# Patient Record
Sex: MALE | Race: WHITE | NOT HISPANIC OR LATINO | Employment: UNEMPLOYED | URBAN - NONMETROPOLITAN AREA
[De-identification: names, ages, dates, MRNs, and addresses within clinical notes are randomized per-mention and may not be internally consistent; named-entity substitution may affect disease eponyms.]

---

## 2021-01-04 ENCOUNTER — OFFICE VISIT (OUTPATIENT)
Dept: PULMONOLOGY | Facility: CLINIC | Age: 41
End: 2021-01-04

## 2021-01-04 VITALS
WEIGHT: 184 LBS | BODY MASS INDEX: 29.57 KG/M2 | DIASTOLIC BLOOD PRESSURE: 70 MMHG | TEMPERATURE: 96.9 F | OXYGEN SATURATION: 98 % | RESPIRATION RATE: 16 BRPM | HEART RATE: 80 BPM | HEIGHT: 66 IN | SYSTOLIC BLOOD PRESSURE: 98 MMHG

## 2021-01-04 DIAGNOSIS — F17.200 SMOKING: ICD-10-CM

## 2021-01-04 DIAGNOSIS — R91.1 LUNG NODULE, SOLITARY: Primary | ICD-10-CM

## 2021-01-04 DIAGNOSIS — R93.89 ABNORMAL CT OF THE CHEST: ICD-10-CM

## 2021-01-04 PROCEDURE — 99244 OFF/OP CNSLTJ NEW/EST MOD 40: CPT | Performed by: INTERNAL MEDICINE

## 2021-01-04 RX ORDER — PANTOPRAZOLE SODIUM 40 MG/1
TABLET, DELAYED RELEASE ORAL
COMMUNITY
Start: 2020-09-29 | End: 2022-01-04 | Stop reason: SDUPTHER

## 2021-01-04 RX ORDER — PREDNISONE 10 MG/1
10 TABLET ORAL DAILY
COMMUNITY
End: 2022-01-04

## 2021-01-04 RX ORDER — PROCHLORPERAZINE MALEATE 10 MG
10 TABLET ORAL EVERY 6 HOURS PRN
COMMUNITY

## 2021-01-04 RX ORDER — LORATADINE 10 MG/1
CAPSULE, LIQUID FILLED ORAL
COMMUNITY

## 2021-01-04 RX ORDER — LISINOPRIL 10 MG/1
TABLET ORAL
COMMUNITY
Start: 2020-09-29 | End: 2022-01-04 | Stop reason: SDUPTHER

## 2021-01-04 RX ORDER — PROPRANOLOL HYDROCHLORIDE 10 MG/1
10 TABLET ORAL 2 TIMES DAILY
COMMUNITY
End: 2022-01-04

## 2021-01-04 RX ORDER — ALBUTEROL SULFATE 90 UG/1
2 AEROSOL, METERED RESPIRATORY (INHALATION) 4 TIMES DAILY
COMMUNITY
Start: 2020-12-16 | End: 2023-02-07 | Stop reason: SDUPTHER

## 2021-01-04 RX ORDER — SUMATRIPTAN 100 MG/1
100 TABLET, FILM COATED ORAL
COMMUNITY
End: 2022-01-04

## 2021-01-04 NOTE — PROGRESS NOTES
"  CONSULT NOTE    Requested by:   Enoch Ortega PA Catron, Mark Otis, PA      Chief Complaint   Patient presents with   • Consult     Lung Nodule       Subjective:  Ronnie Reveles is a 40 y.o. male.     History of Present Illness   Patient comes in today for consultation because of abnormal CT.    Patient underwent a CT due to chest pain. he was told that the imaging study showed a 7 millimeter lung nodule for which a pulmonology consultation was requested    Patient says that he is a smoker and has been doing so for the past 30 years. The patient describes no family members with a history of lung cancer.      His mother  of COPD.       The following portions of the patient's history were reviewed and updated as appropriate: allergies, current medications, past family history, past medical history, past social history and past surgical history.    Review of Systems   Constitutional: Negative for chills, fatigue and fever.   HENT: Negative for sinus pressure, sneezing and sore throat.    Respiratory: Negative for cough, chest tightness, shortness of breath and wheezing.    Cardiovascular: Negative for palpitations and leg swelling.   Psychiatric/Behavioral: Negative for sleep disturbance.   All other systems reviewed and are negative.      Past Medical History:   Diagnosis Date   • Abnormal finding of lung    • Emphysema, unspecified (CMS/HCC)    • DAKOTA (generalized anxiety disorder)    • Hyperlipidemia    • Hypertension    • MDD (major depressive disorder)    • Migraine    • Obesity    • Tobacco abuse        Social History     Tobacco Use   • Smoking status: Current Every Day Smoker     Packs/day: 1.00     Years: 30.00     Pack years: 30.00     Types: Cigarettes   • Smokeless tobacco: Never Used   Substance Use Topics   • Alcohol use: Never     Frequency: Never         Objective:  Visit Vitals  BP 98/70   Pulse 80   Temp 96.9 °F (36.1 °C)   Resp 16   Ht 167.6 cm (66\")   Wt 83.5 kg (184 lb)   SpO2 98%   BMI " 29.70 kg/m²       Physical Exam  Vitals signs reviewed.   Constitutional:       Appearance: He is well-developed.   HENT:      Head: Atraumatic.      Mouth/Throat:      Comments: Edentulous.  Neck:      Musculoskeletal: Neck supple.      Thyroid: No thyromegaly.      Vascular: No JVD.   Cardiovascular:      Rate and Rhythm: Normal rate and regular rhythm.      Heart sounds: No murmur.   Pulmonary:      Effort: Pulmonary effort is normal. No respiratory distress.      Breath sounds: No wheezing or rales.   Musculoskeletal:      Comments: Gait was normal.   Skin:     General: Skin is warm and dry.   Neurological:      Mental Status: He is alert and oriented to person, place, and time.   Psychiatric:         Behavior: Behavior normal.           Assessment/Plan:  Diagnoses and all orders for this visit:    1. Lung nodule, solitary (Primary)  -     CT Chest Without Contrast; Future    2. Abnormal CT of the chest  -     CT Chest Without Contrast; Future    3. Smoking        Return in about 10 weeks (around 3/15/2021) for Recheck, Imaging, For Sayda.    DISCUSSION(if any):  Last CT scan was reviewed in great detail with the patient. Images reviewed personally.   CT scan showed 7mm ground glass opacity in the SAGAR.     Based on the history obtained today, and based on the latest guidelines, the patient will need a repeat CT chest in 3 months, from the last CT.    Based on the results of the CT scan, further recommendations will be made.    The patient was asked to call this office if he develops any weight loss, hemoptysis, night sweats etc.    Patient was offered modalities such as Chantix/nicotine patches/Wellbutrin to aid in smoking cessation.    The patient has tried Chantix, patches and Wellbutrin.  Side effects caused him to stop all the above.     Patient was given reading material.        Dictated utilizing Dragon dictation.    This document was electronically signed by Gilbert Medina MD on 01/04/21 at 14:03 EST

## 2021-02-25 DIAGNOSIS — R93.89 ABNORMAL CT OF THE CHEST: ICD-10-CM

## 2021-02-25 DIAGNOSIS — R91.1 LUNG NODULE, SOLITARY: ICD-10-CM

## 2021-03-15 ENCOUNTER — OFFICE VISIT (OUTPATIENT)
Dept: PULMONOLOGY | Facility: CLINIC | Age: 41
End: 2021-03-15

## 2021-03-15 VITALS
RESPIRATION RATE: 18 BRPM | HEIGHT: 66 IN | BODY MASS INDEX: 29.41 KG/M2 | TEMPERATURE: 96.9 F | SYSTOLIC BLOOD PRESSURE: 124 MMHG | DIASTOLIC BLOOD PRESSURE: 74 MMHG | WEIGHT: 183 LBS | OXYGEN SATURATION: 98 % | HEART RATE: 77 BPM

## 2021-03-15 DIAGNOSIS — R06.02 SHORTNESS OF BREATH: ICD-10-CM

## 2021-03-15 DIAGNOSIS — F17.200 SMOKING: ICD-10-CM

## 2021-03-15 DIAGNOSIS — R91.1 LUNG NODULE, SOLITARY: Primary | ICD-10-CM

## 2021-03-15 DIAGNOSIS — R93.89 ABNORMAL CT OF THE CHEST: ICD-10-CM

## 2021-03-15 PROCEDURE — 99214 OFFICE O/P EST MOD 30 MIN: CPT | Performed by: NURSE PRACTITIONER

## 2021-03-15 RX ORDER — MULTIVIT WITH MINERALS/LUTEIN
250 TABLET ORAL DAILY
COMMUNITY

## 2021-03-15 RX ORDER — CHLORAL HYDRATE 500 MG
CAPSULE ORAL
COMMUNITY

## 2021-03-15 NOTE — PROGRESS NOTES
"Chief Complaint   Patient presents with   • Follow-up     lung nodule,solitary         Subjective   Ronnie Reveles is a 41 y.o. male.     History of Present Illness   Patient comes today for follow up of shortness of breath and COPD.     He was diagnosed with COVID-19 about 2 months ago.  He had convalescent plasma.  Overall he is feeling better.    He does complain of shortness of breath if he walks on an incline or upstairs.    His wife states he wheezes in his sleep.    Patient is using medications, as prescribed.  He uses Anoro whenever he thinks to use it which he admittedly says is not every day.  He uses a rescue inhaler 1-2 times a day.    Exercise tolerance has also remained stable.     Continues to smoke 1 pack per day.    The following portions of the patient's history were reviewed and updated as appropriate: allergies, current medications, past family history, past medical history, past social history and past surgical history.      Review of Systems   HENT: Positive for sneezing. Negative for sinus pressure and sore throat.    Respiratory: Positive for cough, chest tightness and wheezing. Negative for shortness of breath.        Objective   Visit Vitals  /74   Pulse 77   Temp 96.9 °F (36.1 °C)   Resp 18   Ht 167.6 cm (65.98\")   Wt 83 kg (183 lb)   SpO2 98%   BMI 29.55 kg/m²         Physical Exam  Vitals reviewed.   HENT:      Head: Atraumatic.      Mouth/Throat:      Mouth: Mucous membranes are moist.      Pharynx: Oropharynx is clear.      Comments: Crowded oropharynx. Edentulous.  Eyes:      Extraocular Movements: Extraocular movements intact.   Cardiovascular:      Rate and Rhythm: Normal rate and regular rhythm.   Pulmonary:      Effort: Pulmonary effort is normal. No respiratory distress.      Breath sounds: Normal breath sounds. No wheezing.   Musculoskeletal:      Cervical back: Neck supple.      Comments: Gait normal.   Skin:     General: Skin is warm.   Neurological:      Mental Status: " He is alert and oriented to person, place, and time.             Assessment/Plan   Diagnoses and all orders for this visit:    1. Lung nodule, solitary (Primary)    2. Abnormal CT of the chest    3. Smoking    4. Shortness of breath  -     Pulmonary Function Test; Future           Return in about 5 months (around 8/15/2021) for Recheck, For Dr. Medina, PFT.    DISCUSSION (if any):  No change to the current medications has been made.  I have asked him to continue using Anoro and to use it every single day.  He should continue using the rescue inhaler as needed.  He continues to have shortness of breath and wheezing however he has not using the medication as prescribed which may help the symptoms quite a bit.    He will be scheduled for a PFT on follow-up visit.    Compliance with medications stressed.     Side effects of prescribed medications discussed with the patient.    Patient was strongly encouraged to quit smoking as soon as possible.    I reviewed his CT scan from February 2021 and it shows that the previous left upper lobe nodule has resolved.  He does not meet the guidelines for annual low-dose CT screening as of yet.    Dictated utilizing Dragon dictation.    This document was electronically signed by HAKEEM Davison March 15, 2021  10:42 EDT

## 2021-08-16 ENCOUNTER — OFFICE VISIT (OUTPATIENT)
Dept: PULMONOLOGY | Facility: CLINIC | Age: 41
End: 2021-08-16

## 2021-08-16 VITALS
DIASTOLIC BLOOD PRESSURE: 72 MMHG | OXYGEN SATURATION: 97 % | WEIGHT: 190 LBS | RESPIRATION RATE: 18 BRPM | SYSTOLIC BLOOD PRESSURE: 110 MMHG | HEIGHT: 66 IN | HEART RATE: 76 BPM | BODY MASS INDEX: 30.53 KG/M2

## 2021-08-16 DIAGNOSIS — R91.1 LUNG NODULE, SOLITARY: Primary | ICD-10-CM

## 2021-08-16 DIAGNOSIS — F17.200 SMOKING: ICD-10-CM

## 2021-08-16 DIAGNOSIS — R06.02 SHORTNESS OF BREATH: ICD-10-CM

## 2021-08-16 PROCEDURE — 94726 PLETHYSMOGRAPHY LUNG VOLUMES: CPT | Performed by: INTERNAL MEDICINE

## 2021-08-16 PROCEDURE — 99212 OFFICE O/P EST SF 10 MIN: CPT | Performed by: INTERNAL MEDICINE

## 2021-08-16 PROCEDURE — 94729 DIFFUSING CAPACITY: CPT | Performed by: INTERNAL MEDICINE

## 2021-08-16 PROCEDURE — 94060 EVALUATION OF WHEEZING: CPT | Performed by: INTERNAL MEDICINE

## 2021-08-16 NOTE — PROGRESS NOTES
"  Chief Complaint   Patient presents with   • Follow-up     Lung Nodule        Subjective   Ronnie Reveles is a 41 y.o. male.     History of Present Illness   Comes back to follow up on CT and due to history of smoking.    Still smokes 1 PPD.     The following portions of the patient's history were reviewed and updated as appropriate: allergies, current medications, past family history, past medical history, past social history and past surgical history.    Review of Systems   Constitutional: Negative for chills and fever.   HENT: Positive for sneezing. Negative for rhinorrhea, sinus pressure and sore throat.    Respiratory: Negative for cough, shortness of breath and wheezing.    Psychiatric/Behavioral: Negative for sleep disturbance.       Objective   Visit Vitals  /72   Pulse 76   Resp 18   Ht 167.6 cm (66\")   Wt 86.2 kg (190 lb)   SpO2 97%   BMI 30.67 kg/m²       Physical Exam  Vitals reviewed.   Constitutional:       Appearance: He is well-developed.   Neck:      Vascular: No JVD.   Musculoskeletal:      Cervical back: Neck supple.      Comments: Gait was normal.   Skin:     General: Skin is warm and dry.   Neurological:      Mental Status: He is alert and oriented to person, place, and time.         Assessment/Plan   Diagnoses and all orders for this visit:    1. Lung nodule, solitary (Primary)  Comments:  Resolved.     2. Smoking           Return if symptoms worsen or fail to improve.    DISCUSSION (if any):  Last CT was reviewed.    PFTs reviewed. Essentially without obstruction.     Smoking cessation was advised and discussed.     Patient says that he has tried Chantix. He can't do nicotine patches.        Dictated utilizing Dragon dictation.    This document was electronically signed by Gilbert Medina MD on 08/16/21 at 11:41 EDT  "

## 2022-01-04 ENCOUNTER — OFFICE VISIT (OUTPATIENT)
Dept: FAMILY MEDICINE CLINIC | Facility: CLINIC | Age: 42
End: 2022-01-04

## 2022-01-04 VITALS
HEIGHT: 66 IN | BODY MASS INDEX: 30.57 KG/M2 | RESPIRATION RATE: 17 BRPM | OXYGEN SATURATION: 97 % | HEART RATE: 88 BPM | DIASTOLIC BLOOD PRESSURE: 76 MMHG | WEIGHT: 190.2 LBS | SYSTOLIC BLOOD PRESSURE: 122 MMHG | TEMPERATURE: 97.8 F

## 2022-01-04 DIAGNOSIS — I10 ESSENTIAL HYPERTENSION: Primary | ICD-10-CM

## 2022-01-04 DIAGNOSIS — F17.200 TOBACCO DEPENDENCE: ICD-10-CM

## 2022-01-04 DIAGNOSIS — R19.7 DIARRHEA FOLLOWING GASTROINTESTINAL SURGERY: ICD-10-CM

## 2022-01-04 DIAGNOSIS — G43.109 MIGRAINE WITH AURA AND WITHOUT STATUS MIGRAINOSUS, NOT INTRACTABLE: ICD-10-CM

## 2022-01-04 DIAGNOSIS — F41.1 GENERALIZED ANXIETY DISORDER: ICD-10-CM

## 2022-01-04 DIAGNOSIS — K21.9 GERD WITHOUT ESOPHAGITIS: ICD-10-CM

## 2022-01-04 DIAGNOSIS — Z98.890 DIARRHEA FOLLOWING GASTROINTESTINAL SURGERY: ICD-10-CM

## 2022-01-04 DIAGNOSIS — F43.9 SITUATIONAL STRESS: ICD-10-CM

## 2022-01-04 DIAGNOSIS — E78.5 DYSLIPIDEMIA: ICD-10-CM

## 2022-01-04 PROCEDURE — 99204 OFFICE O/P NEW MOD 45 MIN: CPT | Performed by: FAMILY MEDICINE

## 2022-01-04 RX ORDER — LISINOPRIL 10 MG/1
10 TABLET ORAL DAILY
Qty: 90 TABLET | Refills: 2 | Status: SHIPPED | OUTPATIENT
Start: 2022-01-04 | End: 2023-01-31

## 2022-01-04 RX ORDER — SIMVASTATIN 10 MG
10 TABLET ORAL DAILY
Qty: 90 TABLET | Refills: 2 | Status: SHIPPED | OUTPATIENT
Start: 2022-01-04 | End: 2022-04-25

## 2022-01-04 RX ORDER — LORATADINE 10 MG/1
TABLET ORAL
COMMUNITY
Start: 2021-12-08 | End: 2022-01-04 | Stop reason: SDUPTHER

## 2022-01-04 RX ORDER — SIMVASTATIN 10 MG
10 TABLET ORAL DAILY
COMMUNITY
Start: 2021-12-23 | End: 2022-01-04 | Stop reason: SDUPTHER

## 2022-01-04 RX ORDER — PANTOPRAZOLE SODIUM 40 MG/1
40 TABLET, DELAYED RELEASE ORAL DAILY
Qty: 90 TABLET | Refills: 2 | Status: SHIPPED | OUTPATIENT
Start: 2022-01-04 | End: 2023-01-31

## 2022-01-04 RX ORDER — COLESEVELAM 180 1/1
1875 TABLET ORAL 2 TIMES DAILY WITH MEALS
Qty: 180 TABLET | Refills: 3 | Status: SHIPPED | OUTPATIENT
Start: 2022-01-04 | End: 2022-03-14

## 2022-01-04 NOTE — PROGRESS NOTES
New Patient History and Physical      Referring Physician: No ref. provider found    Chief Complaint:    Chief Complaint   Patient presents with   • Fatigue   • Restless Legs Syndrome       History of Present Illness:     juancarlos is a 41-year-old male who presents today to establish care with a new primary care provider. Patient has a known history of hypertension and GERD.    Subjective  Patient reports that he has been seen by Dr. Medina in pulmonology in the past, but is no longer continuing to follow up with him. He states that he is interested in smoking cessation and has tried several methods of quitting in the past without success. He notes that he was previously smoking 2.5 to 3 packs of cigarettes per day but has decreased to 1 pack per day. Patient has been smoking since he was 10 years old. He reports that his anxiety is his biggest obstacle to quitting and explains that anytime he becomes stressed or agitated, he smokes a cigarette. He has tried both Wellbutrin and Chantix for smoking cessation in the past and notes that he experienced negative side effects of Chantix, including vivid and disturbing dreams. He denies taking Wellbutrin and Chantix at the time time. Patient also reports trying vaping as well as nicotine patches, gum, and lozenges; however, none worked well for him, and he had a skin reaction to the patches. He is very motivated to quit and is amenable to having GeneSight testing to see which medications he will respond well to.     Patient has been taking propranolol as needed for his migraines. He has also been prescribed Imitrex, which has only intermittently relieved his migraine symptoms. He affirms that he experiences an aura prior to the onset of his migraines. He also experiences nausea and sensitivity to light and sound. Patient reports that his migraines are infrequent, and he has not had one in a couple of months; however, they are severe and occasionally last for several  hours. He states that certain smells, such as gasoline, trigger his migraines.     Patient reports a history of dyslipidemia and states that his cholesterol was elevated last time it was checked.     Patient affirms that he is still taking Protonix for his GERD. He is no longer taking prednisone, which was prescribed at the emergency room after he experienced dyspnea and chest tightness following his 2nd dose of the COVID-19 vaccine.     Subjective     Review of Systems     1. Constitutional: Negative for fever. Negative for chills, diaphoresis, fatigue and unexpected weight change.   2. HENT: No dysphagia; no changes to vision/hearing/smell/taste; no epistaxis  3. Eyes: Negative for redness and visual disturbance.   4. Respiratory: negative for shortness of breath. Negative for chest pain . Negative for cough and chest tightness.   5. Cardiovascular: Negative for chest pain and palpitations.   6. Gastrointestinal:Chronic abd bloating; chronic diarrhea following GI surgery.  7. Endocrine: Negative for cold intolerance and heat intolerance.   8. Genitourinary: Negative for difficulty urinating, dysuria and frequency.   9. Musculoskeletal: Negative for arthralgias, back pain and myalgias.   10. Skin: Negative for color change, rash and wound.   11. Neurological: Negative for syncope, weakness and headaches.   12. Hematological: Negative for adenopathy. Does not bruise/bleed easily.   13. Psychiatric/Behavioral: Negative for confusion. As per above.    The following portions of the patient's history were reviewed and updated as appropriate: allergies, current medications, past family history, past medical history, past social history, past surgical history and problem list.    Past Medical History:   Past Medical History:   Diagnosis Date   • Abnormal finding of lung    • Emphysema, unspecified (HCC)    • DAKOTA (generalized anxiety disorder)    • Hyperlipidemia    • Hypertension    • MDD (major depressive disorder)    •  Migraine    • Obesity    • Tobacco abuse        Past Surgical History:  Past Surgical History:   Procedure Laterality Date   • APPENDECTOMY     • CHOLECYSTECTOMY         Family History: family history includes Arthritis in his mother; Migraine headaches in his mother.    Social History:  reports that he has been smoking cigarettes. He started smoking about 38 years ago. He has a 30.00 pack-year smoking history. He has never used smokeless tobacco. He reports that he does not drink alcohol and does not use drugs.    Medications:    Current Outpatient Medications:   •  albuterol sulfate  (90 Base) MCG/ACT inhaler, Inhale 2 puffs 4 (Four) Times a Day., Disp: , Rfl:   •  lisinopril (PRINIVIL,ZESTRIL) 10 MG tablet, Take 1 tablet by mouth Daily., Disp: 90 tablet, Rfl: 2  •  Loratadine (Claritin) 10 MG capsule, Take  by mouth., Disp: , Rfl:   •  pantoprazole (PROTONIX) 40 MG EC tablet, Take 1 tablet by mouth Daily. To be taken 30 mins prior to a meal, Disp: 90 tablet, Rfl: 2  •  simvastatin (ZOCOR) 10 MG tablet, Take 1 tablet by mouth Daily., Disp: 90 tablet, Rfl: 2  •  vitamin C (ASCORBIC ACID) 250 MG tablet, Take 250 mg by mouth Daily., Disp: , Rfl:   •  vitamin D3 125 MCG (5000 UT) capsule capsule, Take 5,000 Units by mouth Daily., Disp: , Rfl:   •  colesevelam (Welchol) 625 MG tablet, Take 3 tablets by mouth 2 (Two) Times a Day With Meals., Disp: 180 tablet, Rfl: 3  •  Garlic 10 MG capsule, Take  by mouth., Disp: , Rfl:   •  Omega-3 Fatty Acids (fish oil) 1000 MG capsule capsule, Take  by mouth Daily With Breakfast., Disp: , Rfl:   •  prochlorperazine (COMPAZINE) 10 MG tablet, Take 10 mg by mouth Every 6 (Six) Hours As Needed for Nausea or Vomiting., Disp: , Rfl:   •  ubrogepant (ubrogepant) 100 MG tablet, Take 1 tablet by mouth As Needed (migraine). Take one at onset of headache; may repeat for 1 additional dose 1 hour later if needed x 1 dose only, Disp: 4 tablet, Rfl: 0  •  umeclidinium-vilanterol (ANORO  "ELLIPTA) 62.5-25 MCG/INH aerosol powder  inhaler, Inhale 1 puff Daily., Disp: , Rfl:     Allergies:  Allergies   Allergen Reactions   • Sulfa Antibiotics Hives   • Tramadol Hives       Objective     Physical Exam:  Vital Signs: /76   Pulse 88   Temp 97.8 °F (36.6 °C)   Resp 17   Ht 167.6 cm (66\")   Wt 86.3 kg (190 lb 3.2 oz)   SpO2 97%   BMI 30.70 kg/m²        General Appearance: alert, oriented x 3, no acute distress.  Skin: warm and dry.   HEENT: Atraumatic.  pupils round and reactive to light and accommodation, oral mucosa pink and moist.  Nares patent without epistaxis.  External auditory canals are patent tympanic membranes intact.  Neck: supple, no JVD, trachea midline.  No thyromegaly  Lungs: CTA, unlabored breathing effort.  Heart: RRR, normal S1 and S2, no S3, no rub.  Abdomen: soft, non-tender, no palpable bladder, present bowel sounds to auscultation ×4.  No guarding or rigidity.  Extremities: no clubbing, cyanosis or edema.  Good range of motion actively and passively.  Symmetric muscle strength and development  Neuro: normal speech and mental status.  Cranial nerves II through XII intact.  No anosmia. DTR 2+; proprioception intact.  No focal motor/sensory deficits.    Advance Care Planning   ACP discussion was held with the patient during this visit. Patient does not have an advance directive, information provided.    Assessment / Plan     Assessment/Plan:   Diagnoses and all orders for this visit:    1. Essential hypertension (Primary)  -     lisinopril (PRINIVIL,ZESTRIL) 10 MG tablet; Take 1 tablet by mouth Daily.  Dispense: 90 tablet; Refill: 2    2. Dyslipidemia  -     simvastatin (ZOCOR) 10 MG tablet; Take 1 tablet by mouth Daily.  Dispense: 90 tablet; Refill: 2  -     colesevelam (Welchol) 625 MG tablet; Take 3 tablets by mouth 2 (Two) Times a Day With Meals.  Dispense: 180 tablet; Refill: 3    3. Tobacco dependence    4. GERD without esophagitis  -     pantoprazole (PROTONIX) 40 MG " EC tablet; Take 1 tablet by mouth Daily. To be taken 30 mins prior to a meal  Dispense: 90 tablet; Refill: 2    5. Migraine with aura and without status migrainosus, not intractable  -     ubrogepant (ubrogepant) 100 MG tablet; Take 1 tablet by mouth As Needed (migraine). Take one at onset of headache; may repeat for 1 additional dose 1 hour later if needed x 1 dose only  Dispense: 4 tablet; Refill: 0    6. Generalized anxiety disorder    7. Situational stress    8. Diarrhea following gastrointestinal surgery      Plan:   1. Essential hypertension.  - Blood pressure is at goal, vital signs demonstrate hemodynamic stability.   - Continue current dose of lisinopril. Refill sent to his pharmacy.  - Most recent labs were completed just a couple of weeks ago.    2. Dyslipidemia.  - Continue simvastatin.   - Continue low cholesterol dietary intake.   - Given his continued dyslipidemia despite simvastatin use, I have recommended addition of Welchol. He will begin with 1 tablet twice daily, increase to 2 tablets twice daily as tolerated. He may increase up to 3 tablets BID with meals if needed/tolerated.  - Most recent lipid panel does demonstrate improvement since starting statin therapy; however, he is not at goal.     3. Tobacco dependence.  - Patient has tried numerous therapies in the past, including nicotine replacement, Chantix, and bupropion.   - Intolerant to Chantix secondary to nightmares/vivid dreams and altered sensorium.   - Did not continue bupropion, as it did not provide any substantial improvement to his generalized anxiety and situational stress.   - [facility] to further evaluate any other potential medications that would not be therapeutically beneficial for him. We will be more focused on the ones that are recommended.     4. GERD.  - Continue PPI therapy.   - Continue dietary/lifestyle modifications to aid in symptom management.     5. Migraine headache disorder.  - Patient  has not responded well to PRN Imitrex in the past.   - He has not shown any significant improvement to the frequency or severity of his headaches with propranolol dosing. He has already discontinued that.   - He does only have a severe migraine headache every other month or so. Has classic symptoms.   - I have stressed the importance of maintaining appropriate rest and hydration. Avoid any known triggers.   - Samples of Ubrelvy were provided to patient today. He verbalized understanding of the medication. He will notify the office should he develop any ill effects to it.     6. Generalized anxiety disorder.    7. Situational stress.  -  I have recommended GeneSight testing as mentioned above.   Discussed need for stress/anxiety reducing techniques such as prayer/meditation/breathing and counting exercises and avoidance of stress producing environments/situations; will follow clinically.      8. Diarrhea following gastrointestinal surgery.  - He should have significant improvement of his symptoms with use of Welchol as prescribed for his dyslipidemia above.   - Plan to follow clinically.        Discussion Summary:    I spent 45 minutes caring for Ronnie on this date of service. This time includes time spent by me in the following activities:preparing for the visit, performing a medically appropriate examination and/or evaluation , counseling and educating the patient/family/caregiver, ordering medications, tests, or procedures, documenting information in the medical record and care coordination    Discussed plan of care in detail with pt today; pt verb understanding and agrees.  Follow up:  Return in about 3 weeks (around 1/25/2022) for Recheck, Med Change/New Meds.     There are no Patient Instructions on file for this visit.    Transcribed from ambient dictation for Kentrell Saravia DO by Manny Suazo Rep.  01/04/22   21:32 EST    Patient verbalized consent to the visit recording.      Please note  that portions of this note may have been completed with a voice recognition program. Efforts were made to edit the dictations, but occasionally words are mistranscribed.

## 2022-01-19 ENCOUNTER — PRIOR AUTHORIZATION (OUTPATIENT)
Dept: FAMILY MEDICINE CLINIC | Facility: CLINIC | Age: 42
End: 2022-01-19

## 2022-02-02 ENCOUNTER — OFFICE VISIT (OUTPATIENT)
Dept: FAMILY MEDICINE CLINIC | Facility: CLINIC | Age: 42
End: 2022-02-02

## 2022-02-02 VITALS
HEART RATE: 95 BPM | WEIGHT: 192 LBS | BODY MASS INDEX: 30.86 KG/M2 | HEIGHT: 66 IN | SYSTOLIC BLOOD PRESSURE: 124 MMHG | OXYGEN SATURATION: 97 % | DIASTOLIC BLOOD PRESSURE: 70 MMHG

## 2022-02-02 DIAGNOSIS — K21.9 GERD WITHOUT ESOPHAGITIS: ICD-10-CM

## 2022-02-02 DIAGNOSIS — F41.1 GENERALIZED ANXIETY DISORDER: ICD-10-CM

## 2022-02-02 DIAGNOSIS — R19.7 DIARRHEA FOLLOWING GASTROINTESTINAL SURGERY: ICD-10-CM

## 2022-02-02 DIAGNOSIS — Z98.890 DIARRHEA FOLLOWING GASTROINTESTINAL SURGERY: ICD-10-CM

## 2022-02-02 DIAGNOSIS — E78.5 DYSLIPIDEMIA: ICD-10-CM

## 2022-02-02 DIAGNOSIS — I10 ESSENTIAL HYPERTENSION: Primary | ICD-10-CM

## 2022-02-02 DIAGNOSIS — G43.109 MIGRAINE WITH AURA AND WITHOUT STATUS MIGRAINOSUS, NOT INTRACTABLE: ICD-10-CM

## 2022-02-02 DIAGNOSIS — F17.200 TOBACCO DEPENDENCE: ICD-10-CM

## 2022-02-02 PROCEDURE — 99214 OFFICE O/P EST MOD 30 MIN: CPT | Performed by: FAMILY MEDICINE

## 2022-02-02 NOTE — PROGRESS NOTES
Established Patient        Chief Complaint:   Chief Complaint   Patient presents with   • Hypertension     3 week follow up for HTN        Ronnie Reveles is a 41 y.o. male is in today for a scheduled follow-up visit of his hypertension. He is accompanied by an adult female.      History of Present Illness:   The patient reports that he is doing well. He is taking Zocor for his cholesterol.    The patient reports that he gets headaches sometimes. He reports that he has not had to take the Ubrelvy yet.  He reports that he gets them more often in the summer.       Subjective     The following portions of the patient's history were reviewed and updated as appropriate: allergies, current medications, past family history, past medical history, past social history, past surgical history and problem list.    Allergies   Allergen Reactions   • Sulfa Antibiotics Hives   • Tramadol Hives       Review of Systems  1. Constitutional: Negative for fever. Negative for chills, diaphoresis, fatigue and unexpected weight change.   2. HENT: No dysphagia; no changes to vision/hearing/smell/taste; no epistaxis  3. Eyes: Negative for redness and visual disturbance.   4. Respiratory: negative for shortness of breath. Negative for chest pain . Negative for cough and chest tightness.   5. Cardiovascular: Negative for chest pain and palpitations.   6. Gastrointestinal: Negative for abdominal distention, abdominal pain and blood in stool.   7. Endocrine: Negative for cold intolerance and heat intolerance.   8. Genitourinary: Negative for difficulty urinating, dysuria and frequency.   9. Musculoskeletal: Negative for arthralgias, back pain and myalgias.   10. Skin: Negative for color change, rash and wound.   11. Neurological: Negative for syncope, weakness and headaches.   12. Hematological: Negative for adenopathy. Does not bruise/bleed easily.   13. Psychiatric/Behavioral: Negative for confusion. The patient is not  "nervous/anxious.    Objective     Physical Exam   Vital Signs: /70   Pulse 95   Ht 167.6 cm (65.98\")   Wt 87.1 kg (192 lb)   SpO2 97%   BMI 31.00 kg/m²     General Appearance: alert, oriented x 3, no acute distress.  Skin: warm and dry.   HEENT: Atraumatic.  pupils round and reactive to light and accommodation, oral mucosa pink and moist.  Nares patent without epistaxis.  External auditory canals are patent tympanic membranes intact.  Neck: supple, no JVD, trachea midline.  No thyromegaly  Lungs: CTA, unlabored breathing effort.  Heart: RRR, normal S1 and S2, no S3, no rub.  Abdomen: soft, non-tender, no palpable bladder, present bowel sounds to auscultation ×4.  No guarding or rigidity.  Extremities: no clubbing, cyanosis or edema.  Good range of motion actively and passively.  Symmetric muscle strength and development  Neuro: normal speech and mental status.  Cranial nerves II through XII intact.  No anosmia. DTR 2+; proprioception intact.  No focal motor/sensory deficits.    Assessment and Plan      Assessment/Plan:   Diagnoses and all orders for this visit:    1. Essential hypertension (Primary)    2. Dyslipidemia    3. Diarrhea following gastrointestinal surgery  -     sertraline (Zoloft) 50 MG tablet; Take 1 tablet by mouth Daily. 0.5 tablet po daily x 7 days; then 1 tablet po daily each day thereafter  Dispense: 90 tablet; Refill: 1    4. GERD without esophagitis    5. Generalized anxiety disorder    6. Migraine with aura and without status migrainosus, not intractable    7. Tobacco dependence  -     sertraline (Zoloft) 50 MG tablet; Take 1 tablet by mouth Daily. 0.5 tablet po daily x 7 days; then 1 tablet po daily each day thereafter  Dispense: 90 tablet; Refill: 1      Reviewed genesight results with pt today.    Start sertraline; will follow clinically.    Starting welchol today.    Will have pt RTC in 6 weeks or so for eval after starting both new meds.      Discussion Summary:    I spent 30 " minutes caring for Ronnie on this date of service. This time includes time spent by me in the following activities:preparing for the visit, performing a medically appropriate examination and/or evaluation , counseling and educating the patient/family/caregiver, ordering medications, tests, or procedures, documenting information in the medical record and care coordination    Discussed plan of care in detail with pt today; pt verb understanding and agrees.  Follow up:  Return in about 6 weeks (around 3/16/2022) for Recheck, Med Change/New Meds.     There are no Patient Instructions on file for this visit.    Kentrell Saravia DO  02/02/22  14:50 EST        Transcribed from ambient dictation for Kentrell Saravia DO by Bill Lanier. .  02/03/22   11:23 EST    Patient verbalized consent to the visit recording.  I have personally performed the services described in this document as transcribed by the above individual, and it is both accurate and complete.  Kentrell Saravia DO  2/3/2022  12:29 EST      Please note that portions of this note may have been completed with a voice recognition program. Efforts were made to edit the dictations, but occasionally words are mistranscribed.

## 2022-02-09 ENCOUNTER — TELEPHONE (OUTPATIENT)
Dept: FAMILY MEDICINE CLINIC | Facility: CLINIC | Age: 42
End: 2022-02-09

## 2022-02-09 NOTE — TELEPHONE ENCOUNTER
WIFE OF PATIENT HAS CALLED REQUESTING A CALL BACK ASA  TO DISCUSS PATIENT'S ACTIONS SINCE HE HAS BEEN TAKING A NEW MEDICATION.  MEDICATION IS sertraline (Zoloft) 50 MG tablet.  PATIENT'S LEGS AND ARMS JERK A LOT WHEN LYING DOWN. LEGS ARE ALSO CONSTANTLY ACHING MORE SINCE HE HAS STARTED TAKING MEDICATION.  WIFE STATES PATIENT IS ALSO IRRITABLE SINCE BEING ON MEDICATION.  PATIENT DID NOT TAKE MEDICATION TODAY AND WIFE IS REQUESTING A CALL BACK TO ADVISE ON WHAT PATIENT SHOULD DO IN REGARDS TO TAKING MEDICATION.    CALL BACK NUMBER [phone]

## 2022-03-03 ENCOUNTER — TELEPHONE (OUTPATIENT)
Dept: FAMILY MEDICINE CLINIC | Facility: CLINIC | Age: 42
End: 2022-03-03

## 2022-03-03 NOTE — TELEPHONE ENCOUNTER
Caller: CHAYITO CAMARENA    Relationship: Emergency Contact    Best call back number: [phone]    What is the best time to reach you: ANYTIME    Who are you requesting to speak with (clinical staff, provider,  specific staff member): DR CRISTINA    Do you know the name of the person who called: CHAYITO MIGUE    What was the call regarding: PATIENTS ANXIETY MEDICATION IS MAKING HIS LEGS JERK, HIS BODY SHAKE IN HIS SLEEP, AND MAKING HIM IRRITABLE. AND HIS NECK IS HURTING HIM IN THE SAME SPOT WHERE HE HAD SHINGLES BUT THERE IS NO VISUAL INDICATION OF SHINGLES.    Do you require a callback: YES

## 2022-03-13 DIAGNOSIS — E78.5 DYSLIPIDEMIA: ICD-10-CM

## 2022-03-14 RX ORDER — COLESEVELAM 180 1/1
TABLET ORAL
Qty: 180 TABLET | Refills: 3 | Status: SHIPPED | OUTPATIENT
Start: 2022-03-14 | End: 2022-10-18

## 2022-03-14 RX ORDER — COLESEVELAM 180 1/1
TABLET ORAL
Qty: 180 TABLET | Refills: 3 | Status: SHIPPED | OUTPATIENT
Start: 2022-03-14 | End: 2022-07-07

## 2022-03-18 ENCOUNTER — OFFICE VISIT (OUTPATIENT)
Dept: FAMILY MEDICINE CLINIC | Facility: CLINIC | Age: 42
End: 2022-03-18

## 2022-03-18 VITALS
OXYGEN SATURATION: 97 % | SYSTOLIC BLOOD PRESSURE: 138 MMHG | TEMPERATURE: 98 F | DIASTOLIC BLOOD PRESSURE: 82 MMHG | WEIGHT: 190.8 LBS | HEART RATE: 96 BPM | HEIGHT: 66 IN | BODY MASS INDEX: 30.67 KG/M2

## 2022-03-18 DIAGNOSIS — M54.12 CHRONIC CERVICAL RADICULOPATHY: ICD-10-CM

## 2022-03-18 DIAGNOSIS — F41.1 GENERALIZED ANXIETY DISORDER: Primary | ICD-10-CM

## 2022-03-18 DIAGNOSIS — F17.200 TOBACCO DEPENDENCE: ICD-10-CM

## 2022-03-18 DIAGNOSIS — E78.5 DYSLIPIDEMIA: ICD-10-CM

## 2022-03-18 PROCEDURE — 99214 OFFICE O/P EST MOD 30 MIN: CPT | Performed by: FAMILY MEDICINE

## 2022-03-18 RX ORDER — GABAPENTIN 100 MG/1
100 CAPSULE ORAL EVERY EVENING
Qty: 30 CAPSULE | Refills: 1 | Status: SHIPPED | OUTPATIENT
Start: 2022-03-18

## 2022-03-18 RX ORDER — BUSPIRONE HYDROCHLORIDE 5 MG/1
5 TABLET ORAL 2 TIMES DAILY
Qty: 60 TABLET | Refills: 1 | Status: SHIPPED | OUTPATIENT
Start: 2022-03-18 | End: 2022-06-03 | Stop reason: SINTOL

## 2022-03-18 NOTE — PROGRESS NOTES
Established Patient      The patient has consented to being recorded using EJ.    Chief Complaint:   Chief Complaint   Patient presents with   • Follow-up     Patient stopped taking Zoloft due to mood swings and body aches.   • Hypertension        Ronnie Reveles is a 42 y.o. male    History of Present Illness:     The patient presents to the clinic for a scheduled follow-up visit of his hypertension and his mood disorder. The patient stopped taking sertraline due to mood swings and body aches. He is accompanied by an adult female.    The patient reports that he did not do well on the sertraline and his whole body hurt. The adult female states that the patient was getting irritable about a week and a half into it. The adult female states that the patient started shaking about a week and a half into it. He states that he only takes a 0.5f tablet. He notes feeling tired, but not being able to sleep. The patient denies fever, chest pain, shortness of breath, or palpitations. The patient reports that his leg hurt all the time and a muscle in his leg had spasms at night. The patient reports that he is doing okay on the low-dose simvastatin. He reports calling the clinic after his symptoms began and he was informed he needed to discontinue the medication. The adult female states that the patient's anxiety is situational during the week and was on sertraline to help with the anxiety of smoking cessation. She adds the patient has a lack of patience and some irritability.    The patient reports that his neck is still bothering him more at times than the other times. The adult female states that the patient had shingles on his neck in the summer of 2021, in which he went to urgent care because he could not get into the clinic at the time. He reports he was diagnosed with shingles and the blisters subsided after the took the medication. The adult female reports after the medication his neck remained  "sore. He states it feels like it did when he had shingles and notes he can not lay on a pillow or move his head a certain way without pain.  He denies issues with sleeping and states if he gets his neck situated, he sleeps on his side. 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 He felt pain at the time and believed he had a \"crick.\"  He notes he has headaches when his neck hurts and at times wakes up with a headache. He reports he has taken Tylenol or Advil and it subsides; however, by the time he is home from work he has another headache. He denies it being a migraine and notes it is a headache. He states he would like to have his x-rays performed here.    Subjective     The following portions of the patient's history were reviewed and updated as appropriate: allergies, current medications, past family history, past medical history, past social history, past surgical history and problem list.    Allergies   Allergen Reactions   • Sulfa Antibiotics Hives   • Tramadol Hives   • Zoloft [Sertraline] Irritability       Review of Systems  1. Constitutional: Negative for fever. Negative for chills, diaphoresis, fatigue and unexpected weight change.   2. HENT: No dysphagia; no changes to vision/hearing/smell/taste; no epistaxis  3. Eyes: Negative for redness and visual disturbance.   4. Respiratory: negative for shortness of breath. Negative for chest pain . Negative for cough and chest tightness.   5. Cardiovascular: Negative for chest pain and palpitations.   6. Gastrointestinal: Negative for abdominal distention, abdominal pain and blood in stool.   7. Endocrine: Negative for cold intolerance and heat intolerance.   8. Genitourinary: Negative for difficulty urinating, dysuria and frequency.   9. Musculoskeletal: As per above the cervical spine.  10. Skin: Negative for color change, rash and wound.   11. Neurological: Negative for syncope, weakness and headaches. " "  12. Hematological: Negative for adenopathy. Does not bruise/bleed easily.   13. Psychiatric/Behavioral: Negative for confusion. The patient is not nervous/anxious.    Objective     Physical Exam   Vital Signs: /82   Pulse 96   Temp 98 °F (36.7 °C)   Ht 167.6 cm (65.98\")   Wt 86.5 kg (190 lb 12.8 oz)   SpO2 97%   BMI 30.81 kg/m²     General Appearance: alert, oriented x 3, no acute distress.  Skin: warm and dry.   HEENT: Atraumatic.  pupils round and reactive to light and accommodation, oral mucosa pink and moist.  Nares patent without epistaxis.  External auditory canals are patent tympanic membranes intact.  Neck: supple, no JVD, trachea midline.  No thyromegaly.  Diffuse spasticity noted to the paravertebral musculature of the cervical spine.  Impaired range of motion testing on sidebending/rotation/flexion/extension, crepitance noted.  Numerous tender points along the right trapezius muscle additionally.  Lungs: CTA, unlabored breathing effort.  Heart: RRR, normal S1 and S2, no S3, no rub.  Abdomen: soft, non-tender, no palpable bladder, present bowel sounds to auscultation ×4.  No guarding or rigidity.  Extremities: no clubbing, cyanosis or edema.  Impaired range of motion testing to the cervical spine as per above.  Symmetric muscle strength and development  Neuro: normal speech and mental status.  Cranial nerves II through XII intact.  No anosmia. DTR 2+; proprioception intact.  No focal motor/sensory deficits.    Assessment and Plan      Assessment/Plan:   Diagnoses and all orders for this visit:    1. Generalized anxiety disorder (Primary)  -     busPIRone (BUSPAR) 5 MG tablet; Take 1 tablet by mouth 2 (Two) Times a Day.  Dispense: 60 tablet; Refill: 1    2. Tobacco dependence  -     busPIRone (BUSPAR) 5 MG tablet; Take 1 tablet by mouth 2 (Two) Times a Day.  Dispense: 60 tablet; Refill: 1    3. Dyslipidemia    4. Chronic cervical radiculopathy  -     XR Spine Cervical Complete With Obli Flex " Ext  -     gabapentin (NEURONTIN) 100 MG capsule; Take 1 capsule by mouth Every Evening.  Dispense: 30 capsule; Refill: 1      My opinion patient has chronic cervical radiculopathy, with associated spastic torticollis limiting his range of motion.  I have recommended low-dose trial of gabapentin, to be used in the evening additionally.  I have also ordered stress view x-rays of the cervical spine.  Consideration of methocarbamol use if needed.    Patient has not done well with mood stabilizer medications in the past, including most recently with sertraline.  Is currently not utilizing any medication.  I recommended a trial of twice daily dosing low-dose buspirone.  I am hopeful this will provide some anxiolytic benefits, as well as aid in his hopeful tobacco cessation.    Discussed need for stress/anxiety reducing techniques such as prayer/meditation/breathing and counting exercises and avoidance of stress producing environments/situations; will follow clinically.    Continue statin therapy, currently tolerating low-dose simvastatin.  Continue low-cholesterol dietary intake.    Vital signs demonstrate hemodynamic stability.    Discussion Summary:    I spent 30 minutes caring for Ronnie on this date of service. This time includes time spent by me in the following activities:preparing for the visit, performing a medically appropriate examination and/or evaluation , counseling and educating the patient/family/caregiver, ordering medications, tests, or procedures, documenting information in the medical record and care coordination    Discussed plan of care in detail with pt today; pt verb understanding and agrees.  Follow up:  No follow-ups on file.     There are no Patient Instructions on file for this visit.    Kentrell Saravia DO  03/18/22  16:20 EDT        Transcribed from ambient dictation for Kentrell Saravia DO by Tavares To.  03/18/22   18:20 EDT    Patient verbalized consent to the visit recording.  I  have personally performed the services described in this document as transcribed by the above individual, and it is both accurate and complete.  Kentrell Saravia, DO  3/19/2022  07:40 EDT      Please note that portions of this note may have been completed with a voice recognition program. Efforts were made to edit the dictations, but occasionally words are mistranscribed.

## 2022-04-24 DIAGNOSIS — E78.5 DYSLIPIDEMIA: ICD-10-CM

## 2022-04-25 RX ORDER — SIMVASTATIN 10 MG
10 TABLET ORAL DAILY
Qty: 90 TABLET | Refills: 2 | Status: SHIPPED | OUTPATIENT
Start: 2022-04-25 | End: 2023-01-31

## 2022-04-29 ENCOUNTER — OFFICE VISIT (OUTPATIENT)
Dept: FAMILY MEDICINE CLINIC | Facility: CLINIC | Age: 42
End: 2022-04-29

## 2022-04-29 VITALS
BODY MASS INDEX: 31.01 KG/M2 | SYSTOLIC BLOOD PRESSURE: 152 MMHG | DIASTOLIC BLOOD PRESSURE: 86 MMHG | TEMPERATURE: 97.2 F | WEIGHT: 192 LBS | HEART RATE: 80 BPM | OXYGEN SATURATION: 98 %

## 2022-04-29 DIAGNOSIS — M43.6 SPASTIC TORTICOLLIS: ICD-10-CM

## 2022-04-29 DIAGNOSIS — G43.109 MIGRAINE WITH AURA AND WITHOUT STATUS MIGRAINOSUS, NOT INTRACTABLE: Primary | ICD-10-CM

## 2022-04-29 DIAGNOSIS — K64.4 INFLAMED EXTERNAL HEMORRHOID: ICD-10-CM

## 2022-04-29 DIAGNOSIS — K21.9 GERD WITHOUT ESOPHAGITIS: ICD-10-CM

## 2022-04-29 DIAGNOSIS — M54.2 CERVICALGIA: ICD-10-CM

## 2022-04-29 PROCEDURE — 99214 OFFICE O/P EST MOD 30 MIN: CPT | Performed by: FAMILY MEDICINE

## 2022-04-29 RX ORDER — HYDROCORTISONE ACETATE 25 MG/1
25 SUPPOSITORY RECTAL 2 TIMES DAILY
Qty: 12 SUPPOSITORY | Refills: 3 | Status: SHIPPED | OUTPATIENT
Start: 2022-04-29 | End: 2022-10-18

## 2022-04-29 RX ORDER — ERENUMAB-AOOE 70 MG/ML
70 INJECTION SUBCUTANEOUS ONCE
Qty: 1 ML | Refills: 0 | COMMUNITY
Start: 2022-04-29 | End: 2022-04-29

## 2022-04-29 NOTE — PROGRESS NOTES
Established Patient          Chief Complaint:   Chief Complaint   Patient presents with   • Neck Pain   • Rectal Bleeding        Ronnie Reveles is a 42 y.o. male    History of Present Illness:   Here today in scheduled follow-up visit of his migraine headache disorder, chronic spastic torticollis and associated cervicalgia, GERD.    6 HA days/month; responds well to prn ubrelvey.    Has not completed cervical xrays yet; hopes to go this weekend to have done.  Denies any new injuries or trauma.    Denies any aspiration or dysphagia.  He has had periodic bright red blood per the rectum, associated with burning sensation, has a history of external hemorrhoids.  Underwent colonoscopy approximately 4 years ago for evaluation of hematochezia, unremarkable with the exception of hemorrhoids.  Subjective     The following portions of the patient's history were reviewed and updated as appropriate: allergies, current medications, past family history, past medical history, past social history, past surgical history and problem list.    Allergies   Allergen Reactions   • Sulfa Antibiotics Hives   • Tramadol Hives   • Zoloft [Sertraline] Irritability       Review of Systems  1. Constitutional: Negative for fever. Negative for chills, diaphoresis, fatigue and unexpected weight change.   2. HENT: No dysphagia; no changes to vision/hearing/smell/taste; no epistaxis  3. Eyes: Negative for redness and visual disturbance.   4. Respiratory: negative for shortness of breath. Negative for chest pain . Negative for cough and chest tightness.   5. Cardiovascular: Negative for chest pain and palpitations.   6. Gastrointestinal: As per above.  7. Endocrine: Negative for cold intolerance and heat intolerance.   8. Genitourinary: Negative for difficulty urinating, dysuria and frequency.   9. Musculoskeletal: As per above the cervical spine.  10. Skin: Negative for color change, rash and wound.   11. Neurological:  Negative for syncope, weakness and headaches.   12. Hematological: Negative for adenopathy. Does not bruise/bleed easily.   13. Psychiatric/Behavioral: Negative for confusion. The patient is not nervous/anxious.    Objective     Physical Exam   Vital Signs: /86 (BP Location: Right arm, Patient Position: Sitting, Cuff Size: Adult)   Pulse 80   Temp 97.2 °F (36.2 °C)   Wt 87.1 kg (192 lb)   SpO2 98%   BMI 31.01 kg/m²     General Appearance: alert, oriented x 3, no acute distress.  Skin: warm and dry.   HEENT: Atraumatic.  pupils round and reactive to light and accommodation, oral mucosa pink and moist.  Nares patent without epistaxis.  External auditory canals are patent tympanic membranes intact.  Neck: supple, no JVD, trachea midline.  No thyromegaly.  Diffuse spasticity noted to the paravertebral musculature of the cervical spine.  Impaired range of motion testing on sidebending/rotation/flexion/extension, crepitance noted.  Numerous tender points along the right trapezius muscle additionally.  Lungs: CTA, unlabored breathing effort.  Heart: RRR, normal S1 and S2, no S3, no rub.  Abdomen: soft, non-tender, no palpable bladder, present bowel sounds to auscultation ×4.  No guarding or rigidity.  Extremities: no clubbing, cyanosis or edema.  Impaired range of motion testing to the cervical spine as per above.  Symmetric muscle strength and development  Neuro: normal speech and mental status.  Cranial nerves II through XII intact.  No anosmia. DTR 2+; proprioception intact.  No focal motor/sensory deficits.    Assessment and Plan      Assessment/Plan:   Diagnoses and all orders for this visit:    1. Migraine with aura and without status migrainosus, not intractable (Primary)  -     Erenumab-aooe (Aimovig) 70 MG/ML prefilled syringe; Inject 1 mL under the skin into the appropriate area as directed 1 (One) Time for 1 dose.  Dispense: 1 mL; Refill: 0    2. GERD without esophagitis    3. Inflamed external  hemorrhoid  -     hydrocortisone (Anucort-HC) 25 MG suppository; Insert 1 suppository into the rectum 2 (Two) Times a Day.  Dispense: 12 suppository; Refill: 3    4. Spastic torticollis    5. Cervicalgia      Start trial of Aimovig; will follow cliniclly.  First dose provided in office today.    History of normal colonoscopy per pt report at [facility], Dr. Macario, approx 4 yrs ago; burning sensation likely a result of hemorrhoids; will use trial of anucort suppository and follow clinically.    Will follow results of xrays when completed.    VSS.      Continue PPI therapy.  Continue dietary/lifestyle modifications to aid in symptom management of his chronic GERD.    Discussion Summary:    I spent 30 minutes caring for Ronnie on this date of service. This time includes time spent by me in the following activities:preparing for the visit, performing a medically appropriate examination and/or evaluation , counseling and educating the patient/family/caregiver, ordering medications, tests, or procedures, documenting information in the medical record and care coordination    I have reviewed and updated all copied forward information, as appropriate.  I attest to the accuracy and relevance of any unchanged information.    Discussed plan of care in detail with pt today; pt verb understanding and agrees.  Follow up:  No follow-ups on file.     There are no Patient Instructions on file for this visit.    Kentrell Saravia DO  04/30/22  10:21 EDT      Please note that portions of this note may have been completed with a voice recognition program. Efforts were made to edit the dictations, but occasionally words are mistranscribed.

## 2022-04-30 PROBLEM — M54.2 CERVICALGIA: Status: ACTIVE | Noted: 2022-04-30

## 2022-04-30 PROBLEM — M43.6 SPASTIC TORTICOLLIS: Status: ACTIVE | Noted: 2022-04-30

## 2022-06-03 ENCOUNTER — OFFICE VISIT (OUTPATIENT)
Dept: FAMILY MEDICINE CLINIC | Facility: CLINIC | Age: 42
End: 2022-06-03

## 2022-06-03 VITALS
RESPIRATION RATE: 15 BRPM | HEART RATE: 82 BPM | HEIGHT: 66 IN | DIASTOLIC BLOOD PRESSURE: 64 MMHG | OXYGEN SATURATION: 98 % | TEMPERATURE: 98.1 F | SYSTOLIC BLOOD PRESSURE: 108 MMHG | BODY MASS INDEX: 30.7 KG/M2 | WEIGHT: 191 LBS

## 2022-06-03 DIAGNOSIS — G43.109 MIGRAINE WITH AURA AND WITHOUT STATUS MIGRAINOSUS, NOT INTRACTABLE: ICD-10-CM

## 2022-06-03 DIAGNOSIS — F40.01 PANIC DISORDER WITH AGORAPHOBIA, MILD AGORAPHOBIC AVOIDANCE AND MODERATE PANIC ATTACKS: Primary | ICD-10-CM

## 2022-06-03 PROCEDURE — 99213 OFFICE O/P EST LOW 20 MIN: CPT | Performed by: FAMILY MEDICINE

## 2022-06-03 RX ORDER — DIAZEPAM 2 MG/1
2 TABLET ORAL EVERY 12 HOURS PRN
Qty: 40 TABLET | Refills: 0 | Status: SHIPPED | OUTPATIENT
Start: 2022-06-03 | End: 2022-07-12 | Stop reason: SDUPTHER

## 2022-06-03 NOTE — PROGRESS NOTES
Established Patient          Chief Complaint:   Chief Complaint   Patient presents with   • Med Refill        Ronnie Reveles is a 42 y.o. male    History of Present Illness:   In today in scheduled follow-up visit of his panic disorder, as well as migraine headache disorder.    He denies any problems with his current medication regimen.  He is demonstrated no increased frequency/severity of his migraine headaches.  Listed well-controlled as needed anxiolytic medication, as well as cognitive behavioral therapy.    He denies any fever, chills or night sweats.  No seizure-like activity.  Denies chest pain, syncope, palpitations or vertigo.  Maintains an active lifestyle, balanced dietary intake.  He reports good hydration habits.  Subjective     The following portions of the patient's history were reviewed and updated as appropriate: allergies, current medications, past family history, past medical history, past social history, past surgical history and problem list.    Allergies   Allergen Reactions   • Sulfa Antibiotics Hives   • Tramadol Hives   • Zoloft [Sertraline] Irritability       Review of Systems  1. Constitutional: Negative for fever. Negative for chills, diaphoresis, fatigue and unexpected weight change.   2. HENT: No dysphagia; no changes to vision/hearing/smell/taste; no epistaxis  3. Eyes: Negative for redness and visual disturbance.   4. Respiratory: negative for shortness of breath. Negative for chest pain . Negative for cough and chest tightness.   5. Cardiovascular: Negative for chest pain and palpitations.   6. Gastrointestinal: As per above.  7. Endocrine: Negative for cold intolerance and heat intolerance.   8. Genitourinary: Negative for difficulty urinating, dysuria and frequency.   9. Musculoskeletal: As per above the cervical spine.  10. Skin: Negative for color change, rash and wound.   11. Neurological: Negative for syncope, weakness and headaches.  "  12. Hematological: Negative for adenopathy. Does not bruise/bleed easily.   13. Psychiatric/Behavioral: Negative for confusion. The patient is not nervous/anxious.    Objective     Physical Exam   Vital Signs: /64   Pulse 82   Temp 98.1 °F (36.7 °C)   Resp 15   Ht 167.6 cm (66\")   Wt 86.6 kg (191 lb)   SpO2 98%   BMI 30.83 kg/m²     General Appearance: alert, oriented x 3, no acute distress.  Skin: warm and dry.   HEENT: Atraumatic.  pupils round and reactive to light and accommodation, oral mucosa pink and moist.  Nares patent without epistaxis.  External auditory canals are patent tympanic membranes intact.  Neck: supple, no JVD, trachea midline.  No thyromegaly.  Diffuse spasticity noted to the paravertebral musculature of the cervical spine.  Impaired range of motion testing on sidebending/rotation/flexion/extension, crepitance noted.  Numerous tender points along the right trapezius muscle additionally.  Lungs: CTA, unlabored breathing effort.  Heart: RRR, normal S1 and S2, no S3, no rub.  Abdomen: soft, non-tender, no palpable bladder, present bowel sounds to auscultation ×4.  No guarding or rigidity.  Extremities: no clubbing, cyanosis or edema.  Impaired range of motion testing to the cervical spine as per above.  Symmetric muscle strength and development  Neuro: normal speech and mental status.  Cranial nerves II through XII intact.  No anosmia. DTR 2+; proprioception intact.  No focal motor/sensory deficits.    Assessment and Plan      Assessment/Plan:   Diagnoses and all orders for this visit:    1. Panic disorder with agoraphobia, mild agoraphobic avoidance and moderate panic attacks (Primary)  -     diazePAM (Valium) 2 MG tablet; Take 1 tablet by mouth Every 12 (Twelve) Hours As Needed for Anxiety.  Dispense: 40 tablet; Refill: 0    2. Migraine with aura and without status migrainosus, not intractable    I have refilled patient's as needed anxiolytic medication today.    Discussed need " for stress/anxiety reducing techniques such as prayer/meditation/breathing and counting exercises and avoidance of stress producing environments/situations; will follow clinically.    Continue avoidance of any known triggers of his migraine headache disorder.  As needed use of Ubrelvy.      Discussion Summary:    Discussed plan of care in detail with pt today; pt verb understanding and agrees.    I spent 25 minutes caring for Ronnie on this date of service. This time includes time spent by me in the following activities:preparing for the visit, performing a medically appropriate examination and/or evaluation , counseling and educating the patient/family/caregiver, ordering medications, tests, or procedures, documenting information in the medical record and care coordination    I have reviewed and updated all copied forward information, as appropriate.  I attest to the accuracy and relevance of any unchanged information.    Follow up:  No follow-ups on file.     There are no Patient Instructions on file for this visit.    Kentrell Saravia DO  06/03/22  11:20 EDT      Please note that portions of this note may have been completed with a voice recognition program. Efforts were made to edit the dictations, but occasionally words are mistranscribed.

## 2022-06-30 ENCOUNTER — TELEPHONE (OUTPATIENT)
Dept: FAMILY MEDICINE CLINIC | Facility: CLINIC | Age: 42
End: 2022-06-30

## 2022-07-01 RX ORDER — ERENUMAB-AOOE 70 MG/ML
70 INJECTION SUBCUTANEOUS
Qty: 1 ML | Refills: 6 | Status: SHIPPED | OUTPATIENT
Start: 2022-07-01 | End: 2022-10-18 | Stop reason: DRUGHIGH

## 2022-07-07 DIAGNOSIS — E78.5 DYSLIPIDEMIA: ICD-10-CM

## 2022-07-07 RX ORDER — COLESEVELAM 180 1/1
TABLET ORAL
Qty: 180 TABLET | Refills: 3 | Status: SHIPPED | OUTPATIENT
Start: 2022-07-07 | End: 2022-10-18 | Stop reason: SDUPTHER

## 2022-07-12 ENCOUNTER — OFFICE VISIT (OUTPATIENT)
Dept: FAMILY MEDICINE CLINIC | Facility: CLINIC | Age: 42
End: 2022-07-12

## 2022-07-12 VITALS
HEIGHT: 66 IN | HEART RATE: 101 BPM | TEMPERATURE: 98 F | BODY MASS INDEX: 30.53 KG/M2 | RESPIRATION RATE: 16 BRPM | OXYGEN SATURATION: 99 % | DIASTOLIC BLOOD PRESSURE: 66 MMHG | WEIGHT: 190 LBS | SYSTOLIC BLOOD PRESSURE: 112 MMHG

## 2022-07-12 DIAGNOSIS — G43.109 MIGRAINE WITH AURA AND WITHOUT STATUS MIGRAINOSUS, NOT INTRACTABLE: Primary | ICD-10-CM

## 2022-07-12 DIAGNOSIS — Z98.890 DIARRHEA FOLLOWING GASTROINTESTINAL SURGERY: ICD-10-CM

## 2022-07-12 DIAGNOSIS — R19.7 DIARRHEA FOLLOWING GASTROINTESTINAL SURGERY: ICD-10-CM

## 2022-07-12 DIAGNOSIS — F40.01 PANIC DISORDER WITH AGORAPHOBIA, MILD AGORAPHOBIC AVOIDANCE AND MODERATE PANIC ATTACKS: ICD-10-CM

## 2022-07-12 DIAGNOSIS — N45.2 ORCHITIS OF LEFT TESTICLE: ICD-10-CM

## 2022-07-12 DIAGNOSIS — N45.3 EPIDIDYMO-ORCHITIS, ACUTE: ICD-10-CM

## 2022-07-12 PROCEDURE — 99214 OFFICE O/P EST MOD 30 MIN: CPT | Performed by: FAMILY MEDICINE

## 2022-07-12 RX ORDER — DOXYCYCLINE 100 MG/1
100 CAPSULE ORAL EVERY 12 HOURS SCHEDULED
Qty: 20 CAPSULE | Refills: 0 | Status: SHIPPED | OUTPATIENT
Start: 2022-07-12 | End: 2022-07-22

## 2022-07-12 RX ORDER — DIAZEPAM 2 MG/1
2 TABLET ORAL EVERY 12 HOURS PRN
Qty: 40 TABLET | Refills: 0 | Status: SHIPPED | OUTPATIENT
Start: 2022-07-12 | End: 2022-09-22 | Stop reason: SDUPTHER

## 2022-07-12 NOTE — PROGRESS NOTES
Established Patient          Chief Complaint:   Chief Complaint   Patient presents with   • Follow-up        Ronnie Reveles is a 42 y.o. male    History of Present Illness:   Here today in scheduled follow-up visit of his migraine headache disorder, panic disorder and diarrhea following gastrointestinal surgery.    Patient states he has had significant improvement to his anxiety with just as needed use of anxiolytic medication, as well as continued cognitive behavioral therapies.  He does feel that his headache syndrome improvement has significantly benefited his mood disorder additionally.    Pt reports sig improvement to migraine HA freq/severity.  Currently utilizing once monthly Aimovig.  Utilizes as needed medication infrequently.    Patient reports development of left testicular swelling and pain, evaluated in urgent treatment center initially, subsequently transitioned to ER at their recommendation due to the acuteness of his symptoms.  In the emergency room he underwent evaluation which demonstrated need for emergent ultrasound, however there was no technician available at the local ER here in [place].  He was subsequently transitioned to [facility] in [place], where ultrasound demonstrated epididymoorchitis.  He was started on Levaquin antibiotic coverage.    Subjective     The following portions of the patient's history were reviewed and updated as appropriate: allergies, current medications, past family history, past medical history, past social history, past surgical history and problem list.    Allergies   Allergen Reactions   • Sulfa Antibiotics Hives   • Tramadol Hives   • Zoloft [Sertraline] Irritability       Review of Systems  1. Constitutional: Negative for fever. Negative for chills, diaphoresis, fatigue and unexpected weight change.   2. HENT: No dysphagia; no changes to vision/hearing/smell/taste; no epistaxis  3. Eyes: Negative for redness and visual  "disturbance.   4. Respiratory: negative for shortness of breath. Negative for chest pain . Negative for cough and chest tightness.   5. Cardiovascular: Negative for chest pain and palpitations.   6. Gastrointestinal: As per above.  7. Endocrine: Negative for cold intolerance and heat intolerance.   8. Genitourinary: As per above.  9. Musculoskeletal: Chronic cervical spasticity and discomfort.  10. Skin: Negative for color change, rash and wound.   11. Neurological: Negative for syncope, weakness and headaches.   12. Hematological: Negative for adenopathy. Does not bruise/bleed easily.   13. Psychiatric/Behavioral: Negative for confusion. The patient is not nervous/anxious.    Objective     Physical Exam   Vital Signs: /66   Pulse 101   Temp 98 °F (36.7 °C)   Resp 16   Ht 167.6 cm (66\")   Wt 86.2 kg (190 lb)   SpO2 99%   BMI 30.67 kg/m²     General Appearance: alert, oriented x 3, no acute distress.  Skin: warm and dry.   HEENT: Atraumatic.  pupils round and reactive to light and accommodation, oral mucosa pink and moist.  Nares patent without epistaxis.  External auditory canals are patent tympanic membranes intact.  Neck: supple, no JVD, trachea midline.  No thyromegaly.  Diffuse spasticity noted to the paravertebral musculature of the cervical spine.  Impaired range of motion testing on sidebending/rotation/flexion/extension, crepitance noted.  Numerous tender points along the right trapezius muscle additionally.  Lungs: CTA, unlabored breathing effort.  Heart: RRR, normal S1 and S2, no S3, no rub.  Abdomen: soft, non-tender, no palpable bladder, present bowel sounds to auscultation ×4.  No guarding or rigidity.  Testicular exam demonstrates oblique line to bilateral testes, slight tenderness to the left testicle, Prehn's sign positive.  Mild tenderness on palpation of the epididymis of the left.  Bilateral inguinal lymphadenopathy.  No obvious blue dot sign.  Extremities: no clubbing, cyanosis or " edema.  Impaired range of motion testing to the cervical spine as per above.  Symmetric muscle strength and development  Neuro: normal speech and mental status.  Cranial nerves II through XII intact.  No anosmia. DTR 2+; proprioception intact.  No focal motor/sensory deficits.    Assessment and Plan      Assessment/Plan:   Diagnoses and all orders for this visit:    1. Migraine with aura and without status migrainosus, not intractable (Primary)    2. Panic disorder with agoraphobia, mild agoraphobic avoidance and moderate panic attacks  -     diazePAM (Valium) 2 MG tablet; Take 1 tablet by mouth Every 12 (Twelve) Hours As Needed for Anxiety.  Dispense: 40 tablet; Refill: 0    3. Diarrhea following gastrointestinal surgery    4. Orchitis of left testicle  -     Ambulatory Referral to Urology    5. Epididymo-orchitis, acute  -     doxycycline (MONODOX) 100 MG capsule; Take 1 capsule by mouth Every 12 (Twelve) Hours for 10 days.  Dispense: 20 capsule; Refill: 0  -     Ambulatory Referral to Urology    Continue Aimovig; pt continues to see profound improvement in freq/severity.  Continue avoidance of known triggers.  Of stress importance of adequate sleep and hydration, avoid excess caffeine intake.    Referral to urology for evaluation; will provide 10 d course of doxycycline for anti-inflammatory and antimicrobial benefits; symptoms have improved considerably since use of levaquin (has now completed full course), but not resolved.     Discussed need for stress/anxiety reducing techniques such as prayer/meditation/breathing and counting exercises and avoidance of stress producing environments/situations; will follow clinically.  I have refilled patient's as needed anxiolytic medication.    Discussed need for reduction in sodium/salt/caffeine intake; improve sleep habits as able; inc formal CV exercise program with goal of vigorous activity most, if not all, days of the week; goal of 250 min of sustained HR CV exercise  total per week.      Discussion Summary:    Discussed plan of care in detail with pt today; pt verb understanding and agrees.    I spent 30 minutes caring for Ronnie on this date of service. This time includes time spent by me in the following activities:preparing for the visit, reviewing tests, performing a medically appropriate examination and/or evaluation , counseling and educating the patient/family/caregiver, ordering medications, tests, or procedures, referring and communicating with other health care professionals , documenting information in the medical record and care coordination    I have reviewed and updated all copied forward information, as appropriate.  I attest to the accuracy and relevance of any unchanged information.    Follow up:  No follow-ups on file.     There are no Patient Instructions on file for this visit.    Kentrell Saravia DO  07/12/22  17:06 EDT      Please note that portions of this note may have been completed with a voice recognition program. Efforts were made to edit the dictations, but occasionally words are mistranscribed.

## 2022-08-01 ENCOUNTER — OFFICE VISIT (OUTPATIENT)
Dept: UROLOGY | Facility: CLINIC | Age: 42
End: 2022-08-01

## 2022-08-01 VITALS
OXYGEN SATURATION: 98 % | WEIGHT: 190 LBS | RESPIRATION RATE: 17 BRPM | DIASTOLIC BLOOD PRESSURE: 80 MMHG | HEART RATE: 89 BPM | SYSTOLIC BLOOD PRESSURE: 128 MMHG | BODY MASS INDEX: 30.53 KG/M2 | HEIGHT: 66 IN | TEMPERATURE: 97.8 F

## 2022-08-01 DIAGNOSIS — N45.3 ORCHITIS AND EPIDIDYMITIS: Primary | ICD-10-CM

## 2022-08-01 LAB
BILIRUB BLD-MCNC: NEGATIVE MG/DL
CLARITY, POC: CLEAR
COLOR UR: YELLOW
EXPIRATION DATE: ABNORMAL
GLUCOSE UR STRIP-MCNC: NEGATIVE MG/DL
KETONES UR QL: NEGATIVE
LEUKOCYTE EST, POC: NEGATIVE
Lab: ABNORMAL
NITRITE UR-MCNC: NEGATIVE MG/ML
PH UR: 6 [PH] (ref 5–8)
PROT UR STRIP-MCNC: NEGATIVE MG/DL
RBC # UR STRIP: ABNORMAL /UL
SP GR UR: 1.01 (ref 1–1.03)
UROBILINOGEN UR QL: NORMAL

## 2022-08-01 PROCEDURE — 81003 URINALYSIS AUTO W/O SCOPE: CPT | Performed by: PHYSICIAN ASSISTANT

## 2022-08-01 PROCEDURE — 99203 OFFICE O/P NEW LOW 30 MIN: CPT | Performed by: PHYSICIAN ASSISTANT

## 2022-08-01 NOTE — PROGRESS NOTES
Chief Complaint  Left testicular pain    Referring Provider  Kentrell Saravia, DO    HPI  Mr. Reveles is a 42 y.o. male who presents with left testicular pain and swelling, diagnosed with orchitis, one month ago.  He completed at least 2 weeks of antibiotics. It is currently much improved, but he still has tenderness of the testicle if he sits too hard or hits it against the four apple seat.    Quality:    Dull  Provocative factors:  Contact makes it worse  Palliative factors:   Rest makes it better  Radiation:   None  Severity:   0/10 currently and 5/10 at its worst (since antibiotic treatment)  Previous treatments: Antibiotics, Lortab     No Current LUTS  No History of vasectomy  No History of kidney stones, recent trauma or injury.  No History of constipation      Past Medical History  Past Medical History:   Diagnosis Date   • Abnormal finding of lung    • Emphysema, unspecified (HCC)    • DAKOTA (generalized anxiety disorder)    • Hyperlipidemia    • Hypertension    • MDD (major depressive disorder)    • Migraine    • Obesity    • Tobacco abuse        Past Surgical History  Past Surgical History:   Procedure Laterality Date   • APPENDECTOMY     • CHOLECYSTECTOMY         Medications    Current Outpatient Medications:   •  albuterol sulfate  (90 Base) MCG/ACT inhaler, Inhale 2 puffs 4 (Four) Times a Day., Disp: , Rfl:   •  colesevelam (WELCHOL) 625 MG tablet, TAKE 3 TABLETS BY MOUTH TWICE DAILY WITH MEALS, Disp: 180 tablet, Rfl: 3  •  colesevelam (WELCHOL) 625 MG tablet, TAKE 3 TABLETS BY MOUTH TWICE DAILY WITH MEALS, Disp: 180 tablet, Rfl: 3  •  diazePAM (Valium) 2 MG tablet, Take 1 tablet by mouth Every 12 (Twelve) Hours As Needed for Anxiety., Disp: 40 tablet, Rfl: 0  •  Erenumab-aooe (Aimovig) 70 MG/ML prefilled syringe, Inject 1 mL under the skin into the appropriate area as directed Every 30 (Thirty) Days., Disp: 1 mL, Rfl: 6  •  gabapentin (NEURONTIN) 100 MG capsule, Take 1 capsule by mouth Every  Evening., Disp: 30 capsule, Rfl: 1  •  Garlic 10 MG capsule, Take  by mouth., Disp: , Rfl:   •  hydrocortisone (Anucort-HC) 25 MG suppository, Insert 1 suppository into the rectum 2 (Two) Times a Day., Disp: 12 suppository, Rfl: 3  •  lisinopril (PRINIVIL,ZESTRIL) 10 MG tablet, Take 1 tablet by mouth Daily., Disp: 90 tablet, Rfl: 2  •  Loratadine 10 MG capsule, Take  by mouth., Disp: , Rfl:   •  Omega-3 Fatty Acids (fish oil) 1000 MG capsule capsule, Take  by mouth Daily With Breakfast., Disp: , Rfl:   •  pantoprazole (PROTONIX) 40 MG EC tablet, Take 1 tablet by mouth Daily. To be taken 30 mins prior to a meal, Disp: 90 tablet, Rfl: 2  •  prochlorperazine (COMPAZINE) 10 MG tablet, Take 10 mg by mouth Every 6 (Six) Hours As Needed for Nausea or Vomiting., Disp: , Rfl:   •  simvastatin (ZOCOR) 10 MG tablet, TAKE 1 TABLET BY MOUTH DAILY, Disp: 90 tablet, Rfl: 2  •  ubrogepant (ubrogepant) 100 MG tablet, Take 1 tablet by mouth As Needed (migraine). Take one at onset of headache; may repeat for 1 additional dose 1 hour later if needed x 1 dose only, Disp: 4 tablet, Rfl: 0  •  umeclidinium-vilanterol (ANORO ELLIPTA) 62.5-25 MCG/INH aerosol powder  inhaler, Inhale 1 puff Daily., Disp: , Rfl:   •  vitamin C (ASCORBIC ACID) 250 MG tablet, Take 250 mg by mouth Daily., Disp: , Rfl:   •  vitamin D3 125 MCG (5000 UT) capsule capsule, Take 5,000 Units by mouth Daily., Disp: , Rfl:     Allergies  Allergies   Allergen Reactions   • Sulfa Antibiotics Hives   • Tramadol Hives   • Zoloft [Sertraline] Irritability       Social History  Social History     Socioeconomic History   • Marital status:    Tobacco Use   • Smoking status: Current Every Day Smoker     Packs/day: 1.00     Years: 30.00     Pack years: 30.00     Types: Cigarettes     Start date: 1/4/1984   • Smokeless tobacco: Never Used   Vaping Use   • Vaping Use: Never used   Substance and Sexual Activity   • Alcohol use: Never   • Drug use: Never   • Sexual activity: Yes  "      Family History  Family History   Problem Relation Age of Onset   • Arthritis Mother    • Migraine headaches Mother          Physical Exam  Visit Vitals  /80   Pulse 89   Temp 97.8 °F (36.6 °C)   Resp 17   Ht 167.6 cm (65.98\")   Wt 86.2 kg (190 lb)   SpO2 98%   BMI 30.68 kg/m²       Genitourinary  Penis: uncircumcised penis, glans normal, no penile discharge.  No rashes/lesions.    Testes: descended bilaterally, no masses, LEFT epididymis tender to palpation,  RIGHT epididymis nontender to palpation. No varicocele palpated. No hernia appreciated.      Labs  Brief Urine Lab Results  (Last result in the past 365 days)      Color   Clarity   Blood   Leuk Est   Nitrite   Protein   CREAT   Urine HCG        08/01/22 1556 Yellow   Clear   Trace   Negative   Negative   Negative                       Assessment  Mr. Reveles is a 42 y.o. male who presents with persistent left epididymal pain after an episode of epididymo-orchitis in June.  His exam is mostly benign and does reveal moderate ongoing tenderness of the left epididymis.  His UA reveals trace blood by dip.    We discussed ongoing observation and treatment with NSAIDs.  I did advise him of microscopic hematuria and the need for reassessment.    Plan  1.  NSAIDs for pain as instructed with food  2. Obtain urine micro and culture      Marlin Gomez PA-C    "

## 2022-08-04 LAB
APPEARANCE UR: CLEAR
BACTERIA #/AREA URNS HPF: NORMAL /HPF
BACTERIA UR CULT: NO GROWTH
BACTERIA UR CULT: NORMAL
BILIRUB UR QL STRIP: NEGATIVE
CASTS URNS MICRO: NORMAL
COLOR UR: YELLOW
EPI CELLS #/AREA URNS HPF: NORMAL /HPF
GLUCOSE UR QL STRIP: NEGATIVE
HGB UR QL STRIP: NEGATIVE
KETONES UR QL STRIP: NEGATIVE
LEUKOCYTE ESTERASE UR QL STRIP: ABNORMAL
NITRITE UR QL STRIP: NEGATIVE
PH UR STRIP: 6 [PH] (ref 5–8)
PROT UR QL STRIP: NEGATIVE
RBC #/AREA URNS HPF: NORMAL /HPF
SP GR UR STRIP: 1.01 (ref 1–1.03)
UROBILINOGEN UR STRIP-MCNC: ABNORMAL MG/DL
WBC #/AREA URNS HPF: NORMAL /HPF

## 2022-08-30 ENCOUNTER — OFFICE VISIT (OUTPATIENT)
Dept: UROLOGY | Facility: CLINIC | Age: 42
End: 2022-08-30

## 2022-08-30 VITALS
OXYGEN SATURATION: 98 % | HEART RATE: 85 BPM | WEIGHT: 190 LBS | SYSTOLIC BLOOD PRESSURE: 116 MMHG | BODY MASS INDEX: 30.53 KG/M2 | HEIGHT: 66 IN | DIASTOLIC BLOOD PRESSURE: 74 MMHG

## 2022-08-30 DIAGNOSIS — N45.3 ORCHITIS AND EPIDIDYMITIS: Primary | ICD-10-CM

## 2022-08-30 LAB
BILIRUB BLD-MCNC: NEGATIVE MG/DL
CLARITY, POC: CLEAR
COLOR UR: YELLOW
EXPIRATION DATE: ABNORMAL
GLUCOSE UR STRIP-MCNC: NEGATIVE MG/DL
KETONES UR QL: NEGATIVE
LEUKOCYTE EST, POC: NEGATIVE
Lab: ABNORMAL
NITRITE UR-MCNC: NEGATIVE MG/ML
PH UR: 6.5 [PH] (ref 5–8)
PROT UR STRIP-MCNC: NEGATIVE MG/DL
RBC # UR STRIP: ABNORMAL /UL
SP GR UR: 1.01 (ref 1–1.03)
UROBILINOGEN UR QL: NORMAL

## 2022-08-30 PROCEDURE — 81003 URINALYSIS AUTO W/O SCOPE: CPT | Performed by: PHYSICIAN ASSISTANT

## 2022-08-30 PROCEDURE — 99212 OFFICE O/P EST SF 10 MIN: CPT | Performed by: PHYSICIAN ASSISTANT

## 2022-08-30 NOTE — PROGRESS NOTES
Chief Complaint   Patient presents with   • Follow-up     orchitis        HPI  Mr. Reveles is a 42 y.o. male with history of left epididymoorchitis who presents for follow up.     At this visit, pain has entirely resolved.  He has resumed all normal physical activity.  He denies gross hematuria.    Past Medical History:   Diagnosis Date   • Abnormal finding of lung    • Emphysema, unspecified (HCC)    • DAKOTA (generalized anxiety disorder)    • Hyperlipidemia    • Hypertension    • MDD (major depressive disorder)    • Migraine    • Obesity    • Tobacco abuse        Past Surgical History:   Procedure Laterality Date   • APPENDECTOMY     • CHOLECYSTECTOMY           Current Outpatient Medications:   •  albuterol sulfate  (90 Base) MCG/ACT inhaler, Inhale 2 puffs 4 (Four) Times a Day., Disp: , Rfl:   •  colesevelam (WELCHOL) 625 MG tablet, TAKE 3 TABLETS BY MOUTH TWICE DAILY WITH MEALS, Disp: 180 tablet, Rfl: 3  •  colesevelam (WELCHOL) 625 MG tablet, TAKE 3 TABLETS BY MOUTH TWICE DAILY WITH MEALS, Disp: 180 tablet, Rfl: 3  •  diazePAM (Valium) 2 MG tablet, Take 1 tablet by mouth Every 12 (Twelve) Hours As Needed for Anxiety., Disp: 40 tablet, Rfl: 0  •  Erenumab-aooe (Aimovig) 70 MG/ML prefilled syringe, Inject 1 mL under the skin into the appropriate area as directed Every 30 (Thirty) Days., Disp: 1 mL, Rfl: 6  •  gabapentin (NEURONTIN) 100 MG capsule, Take 1 capsule by mouth Every Evening., Disp: 30 capsule, Rfl: 1  •  Garlic 10 MG capsule, Take  by mouth., Disp: , Rfl:   •  hydrocortisone (Anucort-HC) 25 MG suppository, Insert 1 suppository into the rectum 2 (Two) Times a Day., Disp: 12 suppository, Rfl: 3  •  lisinopril (PRINIVIL,ZESTRIL) 10 MG tablet, Take 1 tablet by mouth Daily., Disp: 90 tablet, Rfl: 2  •  Loratadine 10 MG capsule, Take  by mouth., Disp: , Rfl:   •  Omega-3 Fatty Acids (fish oil) 1000 MG capsule capsule, Take  by mouth Daily With Breakfast., Disp: , Rfl:   •  pantoprazole (PROTONIX) 40 MG  "EC tablet, Take 1 tablet by mouth Daily. To be taken 30 mins prior to a meal, Disp: 90 tablet, Rfl: 2  •  prochlorperazine (COMPAZINE) 10 MG tablet, Take 10 mg by mouth Every 6 (Six) Hours As Needed for Nausea or Vomiting., Disp: , Rfl:   •  simvastatin (ZOCOR) 10 MG tablet, TAKE 1 TABLET BY MOUTH DAILY, Disp: 90 tablet, Rfl: 2  •  ubrogepant (ubrogepant) 100 MG tablet, Take 1 tablet by mouth As Needed (migraine). Take one at onset of headache; may repeat for 1 additional dose 1 hour later if needed x 1 dose only, Disp: 4 tablet, Rfl: 0  •  umeclidinium-vilanterol (ANORO ELLIPTA) 62.5-25 MCG/INH aerosol powder  inhaler, Inhale 1 puff Daily., Disp: , Rfl:   •  vitamin C (ASCORBIC ACID) 250 MG tablet, Take 250 mg by mouth Daily., Disp: , Rfl:   •  vitamin D3 125 MCG (5000 UT) capsule capsule, Take 5,000 Units by mouth Daily., Disp: , Rfl:      Physical Exam  Visit Vitals  /74   Pulse 85   Ht 167.6 cm (65.98\")   Wt 86.2 kg (190 lb)   SpO2 98%   BMI 30.69 kg/m²       Labs  Brief Urine Lab Results  (Last result in the past 365 days)      Color   Clarity   Blood   Leuk Est   Nitrite   Protein   CREAT   Urine HCG        08/30/22 1458 Yellow   Clear   Trace   Negative   Negative   Negative                   Urine Culture    Urine Culture 8/2/22   Urine Culture Final report              Assessment  42 y.o. male with history of left epididymal orchitis.  His urine dip at our last visit revealed trace blood that was later found to have 0-2 red blood cells per high-powered field.  His repeat UA today again flags for trace blood.  I will send it again for microscopic quantification.  I have advised the patient that this is low risk as a sign of urothelial cancer.  I did however advise that he have this monitored at least yearly with PCP.  I recommended that if the microscopic amount of blood increases or he ever has gross hematuria to return to our office.  He and his wife state understanding.    Plan  1.  Obtain urine " micro today    Follow-up as needed

## 2022-08-31 LAB
APPEARANCE UR: CLEAR
BACTERIA #/AREA URNS HPF: NORMAL /HPF
BILIRUB UR QL STRIP: NEGATIVE
CASTS URNS MICRO: NORMAL
COLOR UR: YELLOW
EPI CELLS #/AREA URNS HPF: NORMAL /HPF
GLUCOSE UR QL STRIP: NEGATIVE
HGB UR QL STRIP: NEGATIVE
KETONES UR QL STRIP: NEGATIVE
LEUKOCYTE ESTERASE UR QL STRIP: NEGATIVE
NITRITE UR QL STRIP: NEGATIVE
PH UR STRIP: 7 [PH] (ref 5–8)
PROT UR QL STRIP: NEGATIVE
RBC #/AREA URNS HPF: NORMAL /HPF
SP GR UR STRIP: NORMAL (ref 1–1.03)
UROBILINOGEN UR STRIP-MCNC: NORMAL MG/DL
WBC #/AREA URNS HPF: NORMAL /HPF

## 2022-09-13 ENCOUNTER — PRIOR AUTHORIZATION (OUTPATIENT)
Dept: FAMILY MEDICINE CLINIC | Facility: CLINIC | Age: 42
End: 2022-09-13

## 2022-09-13 NOTE — TELEPHONE ENCOUNTER
A PRIOR AUTH HAS BEEN STARTED THROUGH [employer] FOR AIMOVIG.    WAITING ON A RESPONSE FROM THE INSURANCE.    KEY: [id number]

## 2022-09-19 ENCOUNTER — TELEPHONE (OUTPATIENT)
Dept: FAMILY MEDICINE CLINIC | Facility: CLINIC | Age: 42
End: 2022-09-19

## 2022-09-19 NOTE — TELEPHONE ENCOUNTER
Caller: Ronnie Reveles    Relationship to patient: Self    Best call back number:  [phone]    What is the call regarding: PATIENT'S WIFE STATES THAT HIS AIMOVID 140 MG NEEDS A PRIOR AUTHORIZATION.

## 2022-09-22 DIAGNOSIS — F40.01 PANIC DISORDER WITH AGORAPHOBIA, MILD AGORAPHOBIC AVOIDANCE AND MODERATE PANIC ATTACKS: ICD-10-CM

## 2022-09-22 RX ORDER — DIAZEPAM 2 MG/1
2 TABLET ORAL EVERY 12 HOURS PRN
Qty: 40 TABLET | Refills: 0 | Status: SHIPPED | OUTPATIENT
Start: 2022-09-22 | End: 2022-11-18 | Stop reason: SDUPTHER

## 2022-09-22 NOTE — TELEPHONE ENCOUNTER
THE AIMOVIG 70 MG/ML HAS BEEN APPROVED THROUGH THE PATIENTS INSURANCE.    APPROVAL START DATE: 09/13/2022    APPROVAL END DATE: 12/12/2022    PHARMACY HAS BEEN NOTIFIED.

## 2022-09-22 NOTE — TELEPHONE ENCOUNTER
THIS HAS ALSO BEEN APPROVED THROUGH HIS PRIMARY INSURANCE [employer].    APPROVAL START DATE: 09/22/2022    APPROVAL END DATE: 09/22/2023.    PHARMACY HAS BEEN NOTIFIED.    CASE NUMBER: [id number]      KEY: [id number]

## 2022-09-22 NOTE — TELEPHONE ENCOUNTER
Rx Refill Note  Requested Prescriptions     Pending Prescriptions Disp Refills   • diazePAM (Valium) 2 MG tablet 40 tablet 0     Sig: Take 1 tablet by mouth Every 12 (Twelve) Hours As Needed for Anxiety.      Last office visit with prescribing clinician: 7/12/2022      Next office visit with prescribing clinician: 10/18/2022    OK TO FILL?    PATIENT NEEDS UPDATED UDS AND CSA.     PATIENT HAS APPT SCHEDULED /18/2022, AND HAS BEEN ADVISED TO KEEP THIS APPOINTMENT.    Jayna Claire MA  09/22/22, 09:13 EDT

## 2022-09-22 NOTE — TELEPHONE ENCOUNTER
PATIENTS MEDICATION HAS BEEN APPROVED THROUGH THE INSURANCE.    APPROVAL START DATE: 09/13/2022    APPROVAL END DATE: 12/12/2022    PATIENT HAS BEEN NOTIFIED REGARDING APPROVAL.    PHARMACY HAS BEEN NOTIFIED.

## 2022-10-18 ENCOUNTER — OFFICE VISIT (OUTPATIENT)
Dept: FAMILY MEDICINE CLINIC | Facility: CLINIC | Age: 42
End: 2022-10-18

## 2022-10-18 VITALS
WEIGHT: 200.25 LBS | OXYGEN SATURATION: 97 % | SYSTOLIC BLOOD PRESSURE: 122 MMHG | HEART RATE: 85 BPM | DIASTOLIC BLOOD PRESSURE: 72 MMHG | BODY MASS INDEX: 32.18 KG/M2 | HEIGHT: 66 IN

## 2022-10-18 DIAGNOSIS — K21.9 GERD WITHOUT ESOPHAGITIS: Primary | ICD-10-CM

## 2022-10-18 DIAGNOSIS — Z98.890 DIARRHEA FOLLOWING GASTROINTESTINAL SURGERY: ICD-10-CM

## 2022-10-18 DIAGNOSIS — Z13.6 SCREENING FOR CARDIOVASCULAR CONDITION: ICD-10-CM

## 2022-10-18 DIAGNOSIS — I10 ESSENTIAL HYPERTENSION: ICD-10-CM

## 2022-10-18 DIAGNOSIS — K31.84 GASTROPARESIS: ICD-10-CM

## 2022-10-18 DIAGNOSIS — N52.9 VASCULOGENIC ERECTILE DYSFUNCTION, UNSPECIFIED VASCULOGENIC ERECTILE DYSFUNCTION TYPE: ICD-10-CM

## 2022-10-18 DIAGNOSIS — G43.109 MIGRAINE WITH AURA AND WITHOUT STATUS MIGRAINOSUS, NOT INTRACTABLE: ICD-10-CM

## 2022-10-18 DIAGNOSIS — R10.13 ABDOMINAL PAIN, EPIGASTRIC: ICD-10-CM

## 2022-10-18 DIAGNOSIS — R19.7 DIARRHEA FOLLOWING GASTROINTESTINAL SURGERY: ICD-10-CM

## 2022-10-18 PROCEDURE — 99214 OFFICE O/P EST MOD 30 MIN: CPT | Performed by: FAMILY MEDICINE

## 2022-10-18 RX ORDER — TADALAFIL 5 MG/1
5 TABLET ORAL DAILY PRN
Qty: 30 TABLET | Refills: 5 | Status: SHIPPED | OUTPATIENT
Start: 2022-10-18 | End: 2022-12-12 | Stop reason: SDUPTHER

## 2022-10-18 NOTE — PROGRESS NOTES
Established Patient          Chief Complaint:   Chief Complaint   Patient presents with   • Migraine     3 month follow up for migraines. Would like to discuss migraine medication. Patient states that his shot dosage is at 70 mg/ml on the aimovig.   • Bloated     Complaints of abdominal swelling. Patient states that this has been on going for a while now. Patient states that he is having no issues going to the bath room.   • Hyperlipidemia     Would like to discuss having labs.        Ronnie Reveles is a 42 y.o. male    History of Present Illness:   Here today in scheduled follow-up visit of his GERD, hypertension and migraine headache disorder.    Patient reports continued therapeutic benefit being realized with Aimovig, a decrease in frequency/severity of his headaches and starting.  He is interested in potential dose adjustment, as he does continue to have several migraine headaches monthly.  He denies any seizure-like activity.    Patient reports longstanding difficulty with fullness of erections, as well as the ability to maintain an erection.  He denies any hematospermia or hematuria.  Denies fever, chills or night sweats.    Patient also complains of newly developed abdominal bloating, describes epigastric abdominal discomfort.  Patient states he has easy satiety secondary to intense feeling of fullness immediately after eating small amounts of food intake.  He has continued to have bowel function, denies BRB/BTS.  Denies any significant changes to his diet, as he does routinely try to utilize a balanced dietary intake.  He reports good hydration habits.    He denies fever, chills or night sweats.  Denies worsening of reflux symptoms.    Subjective     The following portions of the patient's history were reviewed and updated as appropriate: allergies, current medications, past family history, past medical history, past social history, past surgical history and problem  "list.    Allergies   Allergen Reactions   • Sulfa Antibiotics Hives   • Tramadol Hives   • Zoloft [Sertraline] Irritability       Review of Systems  1. Constitutional: Negative for fever. Negative for chills, diaphoresis, fatigue and unexpected weight change.   2. HENT: No dysphagia; no changes to vision/hearing/smell/taste; no epistaxis  3. Eyes: Negative for redness and visual disturbance.   4. Respiratory: negative for shortness of breath. Negative for chest pain . Negative for cough and chest tightness.   5. Cardiovascular: Negative for chest pain and palpitations.   6. Gastrointestinal: As per above.  7. Endocrine: Negative for cold intolerance and heat intolerance.   8. Genitourinary: As per above.  9. Musculoskeletal: Chronic cervical spasticity and discomfort.  10. Skin: Negative for color change, rash and wound.   11. Neurological: Negative for syncope, weakness and headaches.   12. Hematological: Negative for adenopathy. Does not bruise/bleed easily.   13. Psychiatric/Behavioral: Negative for confusion. The patient is not nervous/anxious.    Objective     Physical Exam   Vital Signs: /72   Pulse 85   Ht 167.6 cm (65.98\")   Wt 90.8 kg (200 lb 4 oz)   SpO2 97%   BMI 32.34 kg/m²     General Appearance: alert, oriented x 3, no acute distress.  Skin: warm and dry.   HEENT: Atraumatic.  pupils round and reactive to light and accommodation, oral mucosa pink and moist.  Nares patent without epistaxis.  External auditory canals are patent tympanic membranes intact.  Neck: supple, no JVD, trachea midline.  No thyromegaly.  Diffuse spasticity noted to the paravertebral musculature of the cervical spine.  Impaired range of motion testing on sidebending/rotation/flexion/extension, crepitance noted.  Numerous tender points along the right trapezius muscle additionally.  Lungs: CTA, unlabored breathing effort.  Heart: RRR, normal S1 and S2, no S3, no rub.  Abdomen: soft, non-tender, no palpable bladder, " present bowel sounds to auscultation ×4.  No guarding or rigidity.  No CVA tenderness; mild epigastric discomfort; post-surgical scarring noted.  Extremities: no clubbing, cyanosis or edema.  Impaired range of motion testing to the cervical spine as per above.  Symmetric muscle strength and development  Neuro: normal speech and mental status.  Cranial nerves II through XII intact.  No anosmia. DTR 2+; proprioception intact.  No focal motor/sensory deficits.    Assessment and Plan      Assessment/Plan:   Diagnoses and all orders for this visit:    1. GERD without esophagitis (Primary)    2. Diarrhea following gastrointestinal surgery    3. Essential hypertension    4. Gastroparesis  -     Ambulatory Referral to General Surgery    5. Abdominal pain, epigastric  -     Ambulatory Referral to General Surgery    6. Vasculogenic erectile dysfunction, unspecified vasculogenic erectile dysfunction type  -     tadalafil (CIALIS) 5 MG tablet; Take 1 tablet by mouth Daily As Needed for Erectile Dysfunction.  Dispense: 30 tablet; Refill: 5    7. Migraine with aura and without status migrainosus, not intractable  -     Erenumab-aooe (AIMOVIG) 140 MG/ML prefilled syringe; Inject 1 mL under the skin into the appropriate area as directed Every 30 (Thirty) Days.  Dispense: 1 mL; Refill: 5      Referral to surgery for eval of epigastric abd discomfort, acute, as well as perceived gastroparesis; strong suspicion of PUD vs. Adhesions given his history of intra-abdominal surgeries.  Suspect patient will need other diagnostics, including upper endoscopy potentially.    Trial of tadalafil for tx of ED.  Will follow clinically.  I have asked that he notify the office should he develop any ill effects to the new medication.    VSS, appears HD asymptomatic.    Plan trial of increased dose of Aimovig to 140 mg.  I have stressed the importance of adequate rest and good hydration habits.  Avoid excessive caffeine intake or any other known  triggers of his migraines.    Surveillance labs ordered today, patient will return to clinic when fasting; these include CBC/CMP/thyroid/lipid panel.    Discussion Summary:    Discussed plan of care in detail with pt today; pt verb understanding and agrees.    I spent 30 minutes caring for Ronnie on this date of service. This time includes time spent by me in the following activities:preparing for the visit, performing a medically appropriate examination and/or evaluation , counseling and educating the patient/family/caregiver, ordering medications, tests, or procedures, referring and communicating with other health care professionals , documenting information in the medical record and care coordination    I have reviewed and updated all copied forward information, as appropriate.  I attest to the accuracy and relevance of any unchanged information.    Follow up:  No follow-ups on file.     There are no Patient Instructions on file for this visit.    Kentrell Saravia,   10/18/22  18:02 EDT      Please note that portions of this note may have been completed with a voice recognition program. Efforts were made to edit the dictations, but occasionally words are mistranscribed.

## 2022-10-19 ENCOUNTER — OFFICE VISIT (OUTPATIENT)
Dept: SURGERY | Facility: CLINIC | Age: 42
End: 2022-10-19

## 2022-10-19 VITALS
BODY MASS INDEX: 31.5 KG/M2 | WEIGHT: 196 LBS | OXYGEN SATURATION: 99 % | TEMPERATURE: 97 F | HEART RATE: 74 BPM | HEIGHT: 66 IN | RESPIRATION RATE: 14 BRPM | SYSTOLIC BLOOD PRESSURE: 116 MMHG | DIASTOLIC BLOOD PRESSURE: 78 MMHG

## 2022-10-19 DIAGNOSIS — K21.00 GASTROESOPHAGEAL REFLUX DISEASE WITH ESOPHAGITIS WITHOUT HEMORRHAGE: ICD-10-CM

## 2022-10-19 DIAGNOSIS — R10.13 EPIGASTRIC PAIN: Primary | ICD-10-CM

## 2022-10-19 PROCEDURE — 99203 OFFICE O/P NEW LOW 30 MIN: CPT | Performed by: SURGERY

## 2022-10-19 NOTE — PROGRESS NOTES
Patient: Ronnie Reveles    YOB: 1980    Date: 10/19/2022    Primary Care Provider: Kentrell Saravia DO    Chief Complaint   Patient presents with   • Pain     epigastric       SUBJECTIVE:    History of present illness:  I saw the patient in the office today as a consultation for evaluation and treatment of epigastric pain with abdominal bloating especially after eating.    He has had a previous laparoscopic appendectomy, he has had a previous laparoscopic cholecystectomy 5 years ago in [place] for extensive cholelithiasis.    He complains of midepigastric abdominal discomfort associated with bloating, he does state that he hurts around the area of epigastric incision from previous laparoscopic cholecystectomy, this pain is dull and sharp in nature, present over many months, worse with heavy lifting, nonradiating, nothing seems to make it better.    He also gives a history significant for reflux esophagitis and does take a proton pump inhibitor which works sometimes.    The following portions of the patient's history were reviewed and updated as appropriate: allergies, current medications, past family history, past medical history, past social history, past surgical history and problem list.    Review of Systems   Constitutional: Negative for chills, fever and unexpected weight change.   HENT: Negative for trouble swallowing and voice change.    Eyes: Negative for visual disturbance.   Respiratory: Negative for apnea, cough, chest tightness, shortness of breath and wheezing.    Cardiovascular: Negative for chest pain, palpitations and leg swelling.   Gastrointestinal: Positive for abdominal distention and abdominal pain. Negative for anal bleeding, blood in stool, constipation, diarrhea, nausea, rectal pain and vomiting.   Endocrine: Negative for cold intolerance and heat intolerance.   Genitourinary: Negative for difficulty urinating, dysuria, flank pain, scrotal swelling and testicular pain.    Musculoskeletal: Negative for back pain, gait problem and joint swelling.   Skin: Negative for color change, rash and wound.   Neurological: Negative for dizziness, syncope, speech difficulty, weakness, numbness and headaches.   Hematological: Negative for adenopathy. Does not bruise/bleed easily.   Psychiatric/Behavioral: Negative for confusion. The patient is not nervous/anxious.        History:  Past Medical History:   Diagnosis Date   • Abnormal finding of lung    • Emphysema, unspecified (HCC)    • DAKOTA (generalized anxiety disorder)    • Hyperlipidemia    • Hypertension    • MDD (major depressive disorder)    • Migraine    • Obesity    • Tobacco abuse        Past Surgical History:   Procedure Laterality Date   • APPENDECTOMY     • CHOLECYSTECTOMY         Family History   Problem Relation Age of Onset   • Arthritis Mother    • Migraine headaches Mother        Social History     Tobacco Use   • Smoking status: Every Day     Packs/day: 1.00     Years: 30.00     Pack years: 30.00     Types: Cigarettes     Start date: 1/4/1984   • Smokeless tobacco: Never   Vaping Use   • Vaping Use: Never used   Substance Use Topics   • Alcohol use: Never   • Drug use: Never       Allergies:  Allergies   Allergen Reactions   • Sulfa Antibiotics Hives   • Tramadol Hives   • Zoloft [Sertraline] Irritability       Medications:    Current Outpatient Medications:   •  albuterol sulfate  (90 Base) MCG/ACT inhaler, Inhale 2 puffs 4 (Four) Times a Day., Disp: , Rfl:   •  diazePAM (Valium) 2 MG tablet, Take 1 tablet by mouth Every 12 (Twelve) Hours As Needed for Anxiety., Disp: 40 tablet, Rfl: 0  •  Erenumab-aooe (AIMOVIG) 140 MG/ML prefilled syringe, Inject 1 mL under the skin into the appropriate area as directed Every 30 (Thirty) Days., Disp: 1 mL, Rfl: 5  •  gabapentin (NEURONTIN) 100 MG capsule, Take 1 capsule by mouth Every Evening., Disp: 30 capsule, Rfl: 1  •  Garlic 10 MG capsule, Take  by mouth., Disp: , Rfl:   •   "lisinopril (PRINIVIL,ZESTRIL) 10 MG tablet, Take 1 tablet by mouth Daily., Disp: 90 tablet, Rfl: 2  •  Loratadine 10 MG capsule, Take  by mouth., Disp: , Rfl:   •  Omega-3 Fatty Acids (fish oil) 1000 MG capsule capsule, Take  by mouth Daily With Breakfast., Disp: , Rfl:   •  pantoprazole (PROTONIX) 40 MG EC tablet, Take 1 tablet by mouth Daily. To be taken 30 mins prior to a meal, Disp: 90 tablet, Rfl: 2  •  prochlorperazine (COMPAZINE) 10 MG tablet, Take 10 mg by mouth Every 6 (Six) Hours As Needed for Nausea or Vomiting., Disp: , Rfl:   •  simvastatin (ZOCOR) 10 MG tablet, TAKE 1 TABLET BY MOUTH DAILY, Disp: 90 tablet, Rfl: 2  •  tadalafil (CIALIS) 5 MG tablet, Take 1 tablet by mouth Daily As Needed for Erectile Dysfunction., Disp: 30 tablet, Rfl: 5  •  ubrogepant (ubrogepant) 100 MG tablet, Take 1 tablet by mouth As Needed (migraine). Take one at onset of headache; may repeat for 1 additional dose 1 hour later if needed x 1 dose only, Disp: 4 tablet, Rfl: 0  •  umeclidinium-vilanterol (ANORO ELLIPTA) 62.5-25 MCG/INH aerosol powder  inhaler, Inhale 1 puff Daily., Disp: , Rfl:   •  vitamin C (ASCORBIC ACID) 250 MG tablet, Take 250 mg by mouth Daily., Disp: , Rfl:   •  vitamin D3 125 MCG (5000 UT) capsule capsule, Take 5,000 Units by mouth Daily., Disp: , Rfl:     OBJECTIVE:    Vital Signs:   Vitals:    10/19/22 1431   BP: 116/78   Pulse: 74   Resp: 14   Temp: 97 °F (36.1 °C)   SpO2: 99%   Weight: 88.9 kg (196 lb)   Height: 167.6 cm (66\")       Physical Exam:   General Appearance:    Alert, cooperative, in no acute distress   Head:    Normocephalic, without obvious abnormality, atraumatic   Eyes:            Lids and lashes normal, conjunctivae and sclerae normal, no   icterus, no pallor, corneas clear, PERRLA   Ears:    Ears appear intact with no abnormalities noted   Throat:   No oral lesions, no thrush, oral mucosa moist   Neck:   No adenopathy, supple, trachea midline, no thyromegaly, no   carotid bruit, no JVD "   Lungs:     Clear to auscultation,respirations regular, even and                  unlabored    Heart:    Regular rhythm and normal rate, normal S1 and S2, no            murmur, no gallop, no rub, no click   Chest Wall:    No abnormalities observed   Abdomen:     Normal bowel sounds, no masses, no organomegaly, soft        non-tender, non-distended, no guarding, there is evidence of epigastric  tenderness   Extremities:   Moves all extremities well, no edema, no cyanosis, no             redness   Pulses:   Pulses palpable and equal bilaterally   Skin:   No bleeding, bruising or rash   Lymph nodes:   No palpable adenopathy   Neurologic:   Cranial nerves 2 - 12 grossly intact, sensation intact     Results Review:   I reviewed the patient's new clinical results.  I reviewed the patient's new imaging results and agree with the interpretation.  I reviewed the patient's other test results and agree with the interpretation    Review of Systems was reviewed and confirmed as accurate as documented by the MA.    ASSESSMENT/PLAN:    1. Epigastric pain        I did have a detailed and extensive discussion with the patient in the office and they understand that they need to undergo upper endoscopy. Full risks and benefits of operative versus nonoperative intervention were discussed with the patient and these include bleeding and esophageal injury. The patient understands, agrees, and wishes to proceed with the surgical treatment plan as mentioned above. The patient had no questions for me at the end of the discussion.       I discussed the patients findings and my recommendations with patient.     We did perform ultrasound of the anterior abdominal wall today.  This showed no evidence of abdominal wall hernia, we will proceed with upper endoscopy.    Electronically signed by Clemente Amado MD  10/19/22 12:03 EDT

## 2022-10-25 ENCOUNTER — TELEPHONE (OUTPATIENT)
Dept: SURGERY | Facility: CLINIC | Age: 42
End: 2022-10-25

## 2022-10-25 NOTE — TELEPHONE ENCOUNTER
Called to confirm EGD, 10/28/22, Dr Amado @ [facility] no answer,left voice message

## 2022-10-28 ENCOUNTER — OUTSIDE FACILITY SERVICE (OUTPATIENT)
Dept: SURGERY | Facility: CLINIC | Age: 42
End: 2022-10-28

## 2022-10-28 PROCEDURE — 43239 EGD BIOPSY SINGLE/MULTIPLE: CPT | Performed by: SURGERY

## 2022-11-01 ENCOUNTER — TELEPHONE (OUTPATIENT)
Dept: FAMILY MEDICINE CLINIC | Facility: CLINIC | Age: 42
End: 2022-11-01

## 2022-11-01 NOTE — TELEPHONE ENCOUNTER
Caller: CHAYITO CAMARENA    Relationship to patient: Emergency Contact    Best call back number:  [phone]    Date of positive COVID19 test:  10/31/22    COVID19 symptoms: COUGHING UNTIL HE VOMITS, BAD TASTE IN MOUTH, BODY ACHE, STUFFY HEAD, DIARRHEA    Additional information or concerns:     CAN SOMETHING BE CALLED IN FOR COUGHING AND POSSIBLE ANTIBIOTIC?    What is the patients preferred pharmacy:     [id number] - [place], [place] - [address] AT [address] OF [address] - [phone] [facility] [phone] FX

## 2022-11-03 RX ORDER — DEXAMETHASONE 0.5 MG/1
TABLET ORAL
Qty: 21 TABLET | Refills: 0 | Status: SHIPPED | OUTPATIENT
Start: 2022-11-03 | End: 2022-12-14

## 2022-11-03 RX ORDER — BROMPHENIRAMINE MALEATE, PSEUDOEPHEDRINE HYDROCHLORIDE, AND DEXTROMETHORPHAN HYDROBROMIDE 2; 30; 10 MG/5ML; MG/5ML; MG/5ML
5 SYRUP ORAL 4 TIMES DAILY PRN
Qty: 118 ML | Refills: 0 | Status: SHIPPED | OUTPATIENT
Start: 2022-11-03 | End: 2022-12-14

## 2022-11-18 DIAGNOSIS — F40.01 PANIC DISORDER WITH AGORAPHOBIA, MILD AGORAPHOBIC AVOIDANCE AND MODERATE PANIC ATTACKS: ICD-10-CM

## 2022-11-18 NOTE — TELEPHONE ENCOUNTER
Rx Refill Note  Requested Prescriptions     Pending Prescriptions Disp Refills   • diazePAM (Valium) 2 MG tablet 40 tablet 0     Sig: Take 1 tablet by mouth Every 12 (Twelve) Hours As Needed for Anxiety.      Last office visit with prescribing clinician: 10/18/2022      Next office visit with prescribing clinician: 12/12/2022            Elizabeth Mora MA  11/18/22, 13:23 EST

## 2022-11-22 RX ORDER — DIAZEPAM 2 MG/1
2 TABLET ORAL EVERY 12 HOURS PRN
Qty: 40 TABLET | Refills: 0 | Status: SHIPPED | OUTPATIENT
Start: 2022-11-22 | End: 2022-12-12 | Stop reason: SDUPTHER

## 2022-12-12 ENCOUNTER — OFFICE VISIT (OUTPATIENT)
Dept: FAMILY MEDICINE CLINIC | Facility: CLINIC | Age: 42
End: 2022-12-12

## 2022-12-12 VITALS
HEART RATE: 99 BPM | SYSTOLIC BLOOD PRESSURE: 120 MMHG | HEIGHT: 66 IN | BODY MASS INDEX: 31.72 KG/M2 | TEMPERATURE: 98 F | DIASTOLIC BLOOD PRESSURE: 78 MMHG | OXYGEN SATURATION: 97 % | RESPIRATION RATE: 16 BRPM | WEIGHT: 197.4 LBS

## 2022-12-12 DIAGNOSIS — J45.40 MODERATE PERSISTENT REACTIVE AIRWAY DISEASE WITHOUT COMPLICATION: ICD-10-CM

## 2022-12-12 DIAGNOSIS — N52.9 VASCULOGENIC ERECTILE DYSFUNCTION, UNSPECIFIED VASCULOGENIC ERECTILE DYSFUNCTION TYPE: ICD-10-CM

## 2022-12-12 DIAGNOSIS — F40.01 PANIC DISORDER WITH AGORAPHOBIA, MILD AGORAPHOBIC AVOIDANCE AND MODERATE PANIC ATTACKS: ICD-10-CM

## 2022-12-12 DIAGNOSIS — G43.109 MIGRAINE WITH AURA AND WITHOUT STATUS MIGRAINOSUS, NOT INTRACTABLE: ICD-10-CM

## 2022-12-12 DIAGNOSIS — K21.9 GERD WITHOUT ESOPHAGITIS: Primary | ICD-10-CM

## 2022-12-12 PROBLEM — J45.909 REACTIVE AIRWAY DISEASE: Status: ACTIVE | Noted: 2022-12-12

## 2022-12-12 PROCEDURE — 99214 OFFICE O/P EST MOD 30 MIN: CPT | Performed by: FAMILY MEDICINE

## 2022-12-12 RX ORDER — IBUPROFEN 600 MG/1
600 TABLET ORAL EVERY 6 HOURS PRN
Qty: 30 TABLET | Refills: 2 | Status: SHIPPED | OUTPATIENT
Start: 2022-12-12

## 2022-12-12 RX ORDER — TADALAFIL 5 MG/1
5 TABLET ORAL DAILY PRN
Qty: 90 TABLET | Refills: 3
Start: 2022-12-12

## 2022-12-12 RX ORDER — DIAZEPAM 2 MG/1
3 TABLET ORAL EVERY 12 HOURS PRN
Qty: 60 TABLET | Refills: 0
Start: 2022-12-12 | End: 2023-02-07 | Stop reason: SDUPTHER

## 2022-12-12 NOTE — PROGRESS NOTES
Established Patient          Chief Complaint:   Chief Complaint   Patient presents with   • Follow-up   • Headache   • Heartburn        Ronnie Reveles is a 42 y.o. male    History of Present Illness:   Here today in scheduled follow-up visit of his GERD, migraine headache disorder, reactive airway disease and panic disorder.    Patient denies any problems with his current medication regimen.  Continues to realize therapeutic benefit of Aimovig.  For periodic headaches, he does well with use of ibuprofen, needs a refill of his medication.  He only takes very infrequently, as he tries to avoid due to his underlying chronic GERD.  He denies any aspiration/dysphagia.  Denies any BRB/BTS.    Maintains an active lifestyle, balanced dietary intake.  He does typically have reactive airway symptoms during more significant cold air exposure and with seasonal temperature changes.  He has done well with use of Anoro for short periods of time in the past.    Subjective     The following portions of the patient's history were reviewed and updated as appropriate: allergies, current medications, past family history, past medical history, past social history, past surgical history and problem list.    Allergies   Allergen Reactions   • Sulfa Antibiotics Hives   • Tramadol Hives   • Zoloft [Sertraline] Irritability       Review of Systems  1. Constitutional: Negative for fever. Negative for chills, diaphoresis, fatigue and unexpected weight change.   2. HENT: No dysphagia; no changes to vision/hearing/smell/taste; no epistaxis  3. Eyes: Negative for redness and visual disturbance.   4. Respiratory: negative for shortness of breath. Negative for chest pain . Negative for cough and chest tightness.  Otherwise, as per above.  5. Cardiovascular: Negative for chest pain and palpitations.   6. Gastrointestinal: Denies BRB/BTS.  Denies abdominal discomfort.  7. Endocrine: Negative for cold intolerance and heat  "intolerance.   8. Genitourinary: No hematuria or dysuria.  9. Musculoskeletal: Chronic cervical spasticity and discomfort.  10. Skin: Negative for color change, rash and wound.   11. Neurological: Negative for syncope, weakness and headaches.   12. Hematological: Negative for adenopathy. Does not bruise/bleed easily.   13. Psychiatric/Behavioral: Negative for confusion. The patient is not nervous/anxious.    Objective     Physical Exam   Vital Signs: /78   Pulse 99   Temp 98 °F (36.7 °C)   Resp 16   Ht 167.6 cm (66\")   Wt 89.5 kg (197 lb 6.4 oz)   SpO2 97%   BMI 31.86 kg/m²     General Appearance: alert, oriented x 3, no acute distress.  Skin: warm and dry.   HEENT: Atraumatic.  pupils round and reactive to light and accommodation, oral mucosa pink and moist.  Nares patent without epistaxis.  External auditory canals are patent tympanic membranes intact.  Neck: supple, no JVD, trachea midline.  No thyromegaly.  Diffuse spasticity noted to the paravertebral musculature of the cervical spine.  Impaired range of motion testing on sidebending/rotation/flexion/extension, crepitance noted.  Numerous tender points along the right trapezius muscle additionally.  Lungs: CTA, unlabored breathing effort.  Heart: RRR, normal S1 and S2, no S3, no rub.  Abdomen: soft, non-tender, no palpable bladder, present bowel sounds to auscultation ×4.  No guarding or rigidity.  No CVA tenderness; mild epigastric discomfort; post-surgical scarring noted.  Extremities: no clubbing, cyanosis or edema.  Impaired range of motion testing to the cervical spine as per above.  Symmetric muscle strength and development  Neuro: normal speech and mental status.  Cranial nerves II through XII intact.  No anosmia. DTR 2+; proprioception intact.  No focal motor/sensory deficits.    Advance Care Planning   ACP discussion was held with the patient during this visit. Patient does not have an advance directive, information provided.   "     Assessment and Plan      Assessment/Plan:   Diagnoses and all orders for this visit:    1. GERD without esophagitis (Primary)    2. Migraine with aura and without status migrainosus, not intractable  -     ibuprofen (ADVIL,MOTRIN) 600 MG tablet; Take 1 tablet by mouth Every 6 (Six) Hours As Needed for Headache.  Dispense: 30 tablet; Refill: 2    3. Vasculogenic erectile dysfunction, unspecified vasculogenic erectile dysfunction type  -     tadalafil (CIALIS) 5 MG tablet; Take 1 tablet by mouth Daily As Needed for Erectile Dysfunction.  Dispense: 90 tablet; Refill: 3    4. Moderate persistent reactive airway disease without complication    5. Panic disorder with agoraphobia, mild agoraphobic avoidance and moderate panic attacks  -     diazePAM (Valium) 2 MG tablet; Take 1.5 tablets by mouth Every 12 (Twelve) Hours As Needed for Anxiety.  Dispense: 60 tablet; Refill: 0      Prn use of ibuprofen; will follow clinically.  Continue avoidance of known triggers of migraine headaches.  I have stressed the importance of adequate hydration, as well as the need to avoid excessive caffeine intake.  Maintain appropriate sleep/rest.    Continue monthly injections Aimovig.    Anoro inhaler provided to pt today.  Continue prn use albuterol.    Vital signs demonstrate hemodynamic stability.    Continue PPI therapy, as well as dietary/lifestyle modifications to aid in symptom management of his chronic GERD.    I have refilled patient's tadalafil today.    Discussion Summary:    Discussed plan of care in detail with pt today; pt verb understanding and agrees.    I spent 30 minutes caring for Ronnie on this date of service. This time includes time spent by me in the following activities:preparing for the visit, performing a medically appropriate examination and/or evaluation , counseling and educating the patient/family/caregiver, ordering medications, tests, or procedures, documenting information in the medical record and care  coordination    I have reviewed and updated all copied forward information, as appropriate.  I attest to the accuracy and relevance of any unchanged information.    Follow up:  No follow-ups on file.     There are no Patient Instructions on file for this visit.    Kentrell Saravia DO  12/12/22  15:37 EST

## 2023-01-31 DIAGNOSIS — E78.5 DYSLIPIDEMIA: ICD-10-CM

## 2023-01-31 DIAGNOSIS — I10 ESSENTIAL HYPERTENSION: ICD-10-CM

## 2023-01-31 DIAGNOSIS — K21.9 GERD WITHOUT ESOPHAGITIS: ICD-10-CM

## 2023-01-31 RX ORDER — LISINOPRIL 10 MG/1
10 TABLET ORAL DAILY
Qty: 90 TABLET | Refills: 2 | Status: SHIPPED | OUTPATIENT
Start: 2023-01-31

## 2023-01-31 RX ORDER — PANTOPRAZOLE SODIUM 40 MG/1
TABLET, DELAYED RELEASE ORAL
Qty: 90 TABLET | Refills: 2 | Status: SHIPPED | OUTPATIENT
Start: 2023-01-31 | End: 2023-03-10 | Stop reason: SDUPTHER

## 2023-01-31 RX ORDER — SIMVASTATIN 10 MG
10 TABLET ORAL DAILY
Qty: 90 TABLET | Refills: 2 | Status: SHIPPED | OUTPATIENT
Start: 2023-01-31

## 2023-02-07 DIAGNOSIS — K21.9 GERD WITHOUT ESOPHAGITIS: ICD-10-CM

## 2023-02-07 DIAGNOSIS — F40.01 PANIC DISORDER WITH AGORAPHOBIA, MILD AGORAPHOBIC AVOIDANCE AND MODERATE PANIC ATTACKS: ICD-10-CM

## 2023-02-07 RX ORDER — PANTOPRAZOLE SODIUM 40 MG/1
TABLET, DELAYED RELEASE ORAL
Qty: 90 TABLET | Refills: 2 | OUTPATIENT
Start: 2023-02-07

## 2023-02-07 RX ORDER — ALBUTEROL SULFATE 90 UG/1
2 AEROSOL, METERED RESPIRATORY (INHALATION) 4 TIMES DAILY
Qty: 18 G | Refills: 1 | Status: SHIPPED | OUTPATIENT
Start: 2023-02-07

## 2023-02-07 NOTE — TELEPHONE ENCOUNTER
Caller: Ronnie Reveles    Relationship: Self    Best call back number: [phone]    Requested Prescriptions:   Requested Prescriptions     Pending Prescriptions Disp Refills   • diazePAM (Valium) 2 MG tablet 60 tablet 0     Sig: Take 1.5 tablets by mouth Every 12 (Twelve) Hours As Needed for Anxiety.   • pantoprazole (PROTONIX) 40 MG EC tablet 90 tablet 2   • albuterol sulfate  (90 Base) MCG/ACT inhaler       Sig: Inhale 2 puffs 4 (Four) Times a Day.     ALSO NEEDS TO  ANORO SAMPLES.     Pharmacy where request should be sent: [address] [phone] [facility] [phone]      Additional details provided by patient: ALSO NEEDS TO  ANORO SAMPLES. REFILL AS 90 DAY SUPPLY  Does the patient have less than a 3 day supply:  [x] Yes  [] No    Would you like a call back once the refill request has been completed: [x] Yes [] No    If the office needs to give you a call back, can they leave a voicemail: [x] Yes [] No PLEASE CALL TO ALERT WHEN TO  THE SAMPLES.  Manny Contreras Rep   02/07/23 10:46 EST

## 2023-02-13 DIAGNOSIS — F40.01 PANIC DISORDER WITH AGORAPHOBIA, MILD AGORAPHOBIC AVOIDANCE AND MODERATE PANIC ATTACKS: ICD-10-CM

## 2023-02-13 RX ORDER — DIAZEPAM 2 MG/1
3 TABLET ORAL EVERY 12 HOURS PRN
Qty: 60 TABLET | Refills: 0
Start: 2023-02-13 | End: 2023-02-13 | Stop reason: SDUPTHER

## 2023-02-13 RX ORDER — DIAZEPAM 2 MG/1
3 TABLET ORAL EVERY 12 HOURS PRN
Qty: 60 TABLET | Refills: 0 | Status: SHIPPED | OUTPATIENT
Start: 2023-02-13

## 2023-03-10 DIAGNOSIS — K21.9 GERD WITHOUT ESOPHAGITIS: ICD-10-CM

## 2023-03-10 RX ORDER — PANTOPRAZOLE SODIUM 40 MG/1
40 TABLET, DELAYED RELEASE ORAL DAILY
Qty: 90 TABLET | Refills: 2 | Status: SHIPPED | OUTPATIENT
Start: 2023-03-10

## 2023-03-10 NOTE — TELEPHONE ENCOUNTER
Patient has called office, requesting Rx refill for Protonix 40 MG     Verified current phone number and pharmacy on file.

## 2023-04-09 DIAGNOSIS — G43.109 MIGRAINE WITH AURA AND WITHOUT STATUS MIGRAINOSUS, NOT INTRACTABLE: ICD-10-CM

## 2023-04-10 RX ORDER — ERENUMAB-AOOE 140 MG/ML
INJECTION, SOLUTION SUBCUTANEOUS
Qty: 1 ML | Refills: 0 | Status: SHIPPED | OUTPATIENT
Start: 2023-04-10

## 2023-04-19 ENCOUNTER — APPOINTMENT (OUTPATIENT)
Dept: GENERAL RADIOLOGY | Facility: HOSPITAL | Age: 43
End: 2023-04-19
Payer: COMMERCIAL

## 2023-04-19 ENCOUNTER — HOSPITAL ENCOUNTER (EMERGENCY)
Facility: HOSPITAL | Age: 43
Discharge: HOME | End: 2023-04-19
Attending: EMERGENCY MEDICINE | Admitting: EMERGENCY MEDICINE
Payer: COMMERCIAL

## 2023-04-19 VITALS
SYSTOLIC BLOOD PRESSURE: 133 MMHG | DIASTOLIC BLOOD PRESSURE: 93 MMHG | TEMPERATURE: 99 F | OXYGEN SATURATION: 92 % | HEIGHT: 65 IN | HEART RATE: 98 BPM | BODY MASS INDEX: 32.49 KG/M2 | WEIGHT: 195 LBS | RESPIRATION RATE: 18 BRPM

## 2023-04-19 DIAGNOSIS — R07.9 CHEST PAIN, UNSPECIFIED TYPE: Primary | ICD-10-CM

## 2023-04-19 LAB
ALBUMIN SERPL-MCNC: 4.3 G/DL (ref 3.5–5.2)
ALBUMIN/GLOB SERPL: 1.4 G/DL
ALP SERPL-CCNC: 89 U/L (ref 39–117)
ALT SERPL W P-5'-P-CCNC: 60 U/L (ref 1–41)
ANION GAP SERPL CALCULATED.3IONS-SCNC: 10.9 MMOL/L (ref 5–15)
AST SERPL-CCNC: 39 U/L (ref 1–40)
BASOPHILS # BLD AUTO: 0.06 10*3/MM3 (ref 0–0.2)
BASOPHILS NFR BLD AUTO: 0.7 % (ref 0–1.5)
BILIRUB SERPL-MCNC: 0.2 MG/DL (ref 0–1.2)
BUN SERPL-MCNC: 11 MG/DL (ref 6–20)
BUN/CREAT SERPL: 11.7 (ref 7–25)
CALCIUM SPEC-SCNC: 9.6 MG/DL (ref 8.6–10.5)
CHLORIDE SERPL-SCNC: 101 MMOL/L (ref 98–107)
CO2 SERPL-SCNC: 26.1 MMOL/L (ref 22–29)
CREAT SERPL-MCNC: 0.94 MG/DL (ref 0.76–1.27)
D DIMER PPP FEU-MCNC: 0.35 MCGFEU/ML (ref 0–0.5)
DEPRECATED RDW RBC AUTO: 40.6 FL (ref 37–54)
EGFRCR SERPLBLD CKD-EPI 2021: 101.9 ML/MIN/1.73
EOSINOPHIL # BLD AUTO: 0.13 10*3/MM3 (ref 0–0.4)
EOSINOPHIL NFR BLD AUTO: 1.4 % (ref 0.3–6.2)
ERYTHROCYTE [DISTWIDTH] IN BLOOD BY AUTOMATED COUNT: 12.9 % (ref 12.3–15.4)
GEN 5 2HR TROPONIN T REFLEX: <6 NG/L
GLOBULIN UR ELPH-MCNC: 3.1 GM/DL
GLUCOSE SERPL-MCNC: 109 MG/DL (ref 65–99)
HCT VFR BLD AUTO: 40.5 % (ref 37.5–51)
HGB BLD-MCNC: 14.1 G/DL (ref 13–17.7)
HOLD SPECIMEN: NORMAL
HOLD SPECIMEN: NORMAL
IMM GRANULOCYTES # BLD AUTO: 0.05 10*3/MM3 (ref 0–0.05)
IMM GRANULOCYTES NFR BLD AUTO: 0.5 % (ref 0–0.5)
LYMPHOCYTES # BLD AUTO: 3.81 10*3/MM3 (ref 0.7–3.1)
LYMPHOCYTES NFR BLD AUTO: 41.6 % (ref 19.6–45.3)
MCH RBC QN AUTO: 30.1 PG (ref 26.6–33)
MCHC RBC AUTO-ENTMCNC: 34.8 G/DL (ref 31.5–35.7)
MCV RBC AUTO: 86.5 FL (ref 79–97)
MONOCYTES # BLD AUTO: 0.71 10*3/MM3 (ref 0.1–0.9)
MONOCYTES NFR BLD AUTO: 7.8 % (ref 5–12)
NEUTROPHILS NFR BLD AUTO: 4.39 10*3/MM3 (ref 1.7–7)
NEUTROPHILS NFR BLD AUTO: 48 % (ref 42.7–76)
NRBC BLD AUTO-RTO: 0 /100 WBC (ref 0–0.2)
PLATELET # BLD AUTO: 326 10*3/MM3 (ref 140–450)
PMV BLD AUTO: 9.4 FL (ref 6–12)
POTASSIUM SERPL-SCNC: 4 MMOL/L (ref 3.5–5.2)
PROT SERPL-MCNC: 7.4 G/DL (ref 6–8.5)
RBC # BLD AUTO: 4.68 10*6/MM3 (ref 4.14–5.8)
SODIUM SERPL-SCNC: 138 MMOL/L (ref 136–145)
TROPONIN T DELTA: NORMAL
TROPONIN T SERPL HS-MCNC: <6 NG/L
WBC NRBC COR # BLD: 9.15 10*3/MM3 (ref 3.4–10.8)
WHOLE BLOOD HOLD COAG: NORMAL
WHOLE BLOOD HOLD SPECIMEN: NORMAL

## 2023-04-19 PROCEDURE — 99284 EMERGENCY DEPT VISIT MOD MDM: CPT

## 2023-04-19 PROCEDURE — 85025 COMPLETE CBC W/AUTO DIFF WBC: CPT | Performed by: EMERGENCY MEDICINE

## 2023-04-19 PROCEDURE — 84484 ASSAY OF TROPONIN QUANT: CPT | Performed by: EMERGENCY MEDICINE

## 2023-04-19 PROCEDURE — 85379 FIBRIN DEGRADATION QUANT: CPT

## 2023-04-19 PROCEDURE — 36415 COLL VENOUS BLD VENIPUNCTURE: CPT

## 2023-04-19 PROCEDURE — 93005 ELECTROCARDIOGRAM TRACING: CPT | Performed by: EMERGENCY MEDICINE

## 2023-04-19 PROCEDURE — 80053 COMPREHEN METABOLIC PANEL: CPT | Performed by: EMERGENCY MEDICINE

## 2023-04-19 PROCEDURE — 71045 X-RAY EXAM CHEST 1 VIEW: CPT

## 2023-04-19 RX ORDER — SUCRALFATE 1 G/1
1 TABLET ORAL 4 TIMES DAILY
Qty: 40 TABLET | Refills: 0 | Status: SHIPPED | OUTPATIENT
Start: 2023-04-19 | End: 2023-04-21

## 2023-04-19 RX ORDER — SODIUM CHLORIDE 0.9 % (FLUSH) 0.9 %
10 SYRINGE (ML) INJECTION AS NEEDED
Status: DISCONTINUED | OUTPATIENT
Start: 2023-04-19 | End: 2023-04-19 | Stop reason: HOSPADM

## 2023-04-19 RX ORDER — ASPIRIN 325 MG
325 TABLET ORAL ONCE
Status: COMPLETED | OUTPATIENT
Start: 2023-04-19 | End: 2023-04-19

## 2023-04-19 RX ADMIN — ASPIRIN 325 MG ORAL TABLET 325 MG: 325 PILL ORAL at 13:59

## 2023-04-19 RX ADMIN — LIDOCAINE HYDROCHLORIDE: 20 SOLUTION ORAL; TOPICAL at 13:59

## 2023-04-19 NOTE — DISCHARGE INSTRUCTIONS
Return to the emergency room for any worsening symptoms.  Have sent a medication to your pharmacy, make sure to pick this up and take as directed.  Recommend following up with cardiology and GI, their numbers have been attached, you should call to schedule an appointment with each of them.  Additionally recommend close follow-up with your primary care physician.  Please return to the emergency room for any worsening chest pain or worsening symptoms.

## 2023-04-19 NOTE — Clinical Note
[facility] EMERGENCY DEPARTMENT  [address] [address]  Phone: [phone]    Ronnie Reveles was seen and treated in our emergency department on 4/19/2023.  He may return to work on 04/21/2023.         Thank you for choosing [facility].    Bello Troncoso PA-C

## 2023-04-19 NOTE — ED PROVIDER NOTES
Subjective  History of Present Illness:    This is a 43-year-old male, history of emphysema, generalized anxiety disorder, hypertension, hyperlipidemia, tobacco abuse, who presents to the emergency room today for evaluation of chest pain.  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  Describes it as a pressure-like sensation.  No history of MI in the past.  He is not a diabetic.  He reported mild shortness of air when it occurred and feeling like his heart was beating fast.  He denies any shortness of air now.  He reports that his chest pain has mostly eased off and he is mostly asymptomatic at this time.  He does take an 81 mg aspirin daily.  He is on Protonix daily for his history of GERD.  He has not had anything prior to arrival for pain.  Reports that the pain radiated up into his neck and up in his throat.  He reports that it began shortly after eating lunch around 1230 and subsequently happened around 1:00.      Nurses Notes reviewed and agree, including vitals, allergies, social history and prior medical history.     REVIEW OF SYSTEMS: All systems reviewed and not pertinent unless noted.  Review of Systems   Constitutional: Negative for fever.   Respiratory: Positive for shortness of breath.    Cardiovascular: Positive for chest pain and palpitations. Negative for leg swelling.   Gastrointestinal: Negative for nausea and vomiting.   All other systems reviewed and are negative.      Past Medical History:   Diagnosis Date   • Abnormal finding of lung    • Emphysema, unspecified    • DAKOTA (generalized anxiety disorder)    • Hyperlipidemia    • Hypertension    • MDD (major depressive disorder)    • Migraine    • Obesity    • Tobacco abuse        Allergies:    Sulfa antibiotics, Tramadol, and Zoloft [sertraline]      Past Surgical History:   Procedure Laterality Date   • APPENDECTOMY     • CHOLECYSTECTOMY           Social History     Socioeconomic History  "  • Marital status:    Tobacco Use   • Smoking status: Every Day     Packs/day: 1.00     Years: 30.00     Pack years: 30.00     Types: Cigarettes     Start date: 1/4/1984     Passive exposure: Current   • Smokeless tobacco: Never   Vaping Use   • Vaping Use: Never used   Substance and Sexual Activity   • Alcohol use: Never   • Drug use: Never   • Sexual activity: Yes         Family History   Problem Relation Age of Onset   • Arthritis Mother    • Migraine headaches Mother        Objective  Physical Exam:  /93   Pulse 98   Temp 99 °F (37.2 °C) (Oral)   Resp 18   Ht 165.1 cm (65\")   Wt 88.5 kg (195 lb)   SpO2 92%   BMI 32.45 kg/m²      Physical Exam  Vitals and nursing note reviewed.   Constitutional:       General: He is not in acute distress.     Appearance: He is well-developed and normal weight. He is not ill-appearing, toxic-appearing or diaphoretic.   HENT:      Head: Normocephalic and atraumatic.   Eyes:      Extraocular Movements: Extraocular movements intact.   Cardiovascular:      Rate and Rhythm: Regular rhythm. Tachycardia present.      Heart sounds: Normal heart sounds.   Pulmonary:      Breath sounds: Wheezing present. No decreased breath sounds, rhonchi or rales.   Chest:      Chest wall: No mass, deformity, tenderness or edema.   Abdominal:      Palpations: Abdomen is soft.      Tenderness: There is no abdominal tenderness. There is no guarding or rebound.   Musculoskeletal:         General: Normal range of motion.      Cervical back: Normal range of motion and neck supple.      Right lower leg: No tenderness. No edema.      Left lower leg: No tenderness. No edema.   Skin:     General: Skin is warm and dry.      Capillary Refill: Capillary refill takes less than 2 seconds.   Neurological:      General: No focal deficit present.      Mental Status: He is alert and oriented to person, place, and time.   Psychiatric:         Mood and Affect: Mood is anxious.         Behavior: Behavior " normal.             Procedures    ED Course:    ED Course as of 04/19/23 1637   Wed Apr 19, 2023   1351 EKG interpreted by me reveals sinus tachycardia rate 108.  Nonspecific diffuse T wave changes.  No ectopy no ischemic changes [PF]      ED Course User Index  [PF] Benjamin Kelley,        Lab Results (last 24 hours)     Procedure Component Value Units Date/Time    CBC & Differential [[id number]]  (Abnormal) Collected: 04/19/23 1353    Specimen: Blood Updated: 04/19/23 1402    Narrative:      The following orders were created for panel order CBC & Differential.  Procedure                               Abnormality         Status                     ---------                               -----------         ------                     CBC Auto Differential[[id number]]        Abnormal            Final result                 Please view results for these tests on the individual orders.    Comprehensive Metabolic Panel [[id number]]  (Abnormal) Collected: 04/19/23 1353    Specimen: Blood Updated: 04/19/23 1417     Glucose 109 mg/dL      BUN 11 mg/dL      Creatinine 0.94 mg/dL      Sodium 138 mmol/L      Potassium 4.0 mmol/L      Chloride 101 mmol/L      CO2 26.1 mmol/L      Calcium 9.6 mg/dL      Total Protein 7.4 g/dL      Albumin 4.3 g/dL      ALT (SGPT) 60 U/L      AST (SGOT) 39 U/L      Alkaline Phosphatase 89 U/L      Total Bilirubin 0.2 mg/dL      Globulin 3.1 gm/dL      A/G Ratio 1.4 g/dL      BUN/Creatinine Ratio 11.7     Anion Gap 10.9 mmol/L      eGFR 101.9 mL/min/1.73     Narrative:      GFR Normal >60  Chronic Kidney Disease <60  Kidney Failure <15      High Sensitivity Troponin T [[id number]]  (Normal) Collected: 04/19/23 1353    Specimen: Blood Updated: 04/19/23 1419     HS Troponin T <6 ng/L     Narrative:      High Sensitive Troponin T Reference Range:  <10.0 ng/L- Negative Female for AMI  <15.0 ng/L- Negative Male for AMI  >=10 - Abnormal Female indicating possible myocardial injury.  >=15 - Abnormal Male  indicating possible myocardial injury.   Clinicians would have to utilize clinical acumen, EKG, Troponin, and serial changes to determine if it is an Acute Myocardial Infarction or myocardial injury due to an underlying chronic condition.         CBC Auto Differential [[id number]]  (Abnormal) Collected: 04/19/23 1353    Specimen: Blood Updated: 04/19/23 1402     WBC 9.15 10*3/mm3      RBC 4.68 10*6/mm3      Hemoglobin 14.1 g/dL      Hematocrit 40.5 %      MCV 86.5 fL      MCH 30.1 pg      MCHC 34.8 g/dL      RDW 12.9 %      RDW-SD 40.6 fl      MPV 9.4 fL      Platelets 326 10*3/mm3      Neutrophil % 48.0 %      Lymphocyte % 41.6 %      Monocyte % 7.8 %      Eosinophil % 1.4 %      Basophil % 0.7 %      Immature Grans % 0.5 %      Neutrophils, Absolute 4.39 10*3/mm3      Lymphocytes, Absolute 3.81 10*3/mm3      Monocytes, Absolute 0.71 10*3/mm3      Eosinophils, Absolute 0.13 10*3/mm3      Basophils, Absolute 0.06 10*3/mm3      Immature Grans, Absolute 0.05 10*3/mm3      nRBC 0.0 /100 WBC     D-dimer, Quantitative [[id number]]  (Normal) Collected: 04/19/23 1353    Specimen: Blood Updated: 04/19/23 1416     D-Dimer, Quantitative 0.35 MCGFEU/mL     Narrative:      According to the assay 's published package insert, a normal (<0.50 MCGFEU/mL) D-dimer result in conjunction with a non-high clinical probability assessment, excludes deep vein thrombosis (DVT) and pulmonary embolism (PE) with high sensitivity.    D-dimer values increase with age and this can make VTE exclusion of an older population difficult. To address this, the [employer], based on best available evidence and recent guidelines, recommends that clinicians use age-adjusted D-dimer thresholds in patients greater than 50 years of age with: a) a low probability of PE who do not meet all Pulmonary Embolism Rule Out Criteria, or b) in those with intermediate probability of PE.   The formula for an age-adjusted D-dimer cut-off is  "\"age/100\".  For example, a 60 year old patient would have an age-adjusted cut-off of 0.60 MCGFEU/mL and an 80 year old 0.80 MCGFEU/mL.    High Sensitivity Troponin T 2Hr [[id number]] Collected: 04/19/23 1550    Specimen: Blood Updated: 04/19/23 1616     HS Troponin T <6 ng/L      Troponin T Delta --     Comment: Unable to calculate.       Narrative:      High Sensitive Troponin T Reference Range:  <10.0 ng/L- Negative Female for AMI  <15.0 ng/L- Negative Male for AMI  >=10 - Abnormal Female indicating possible myocardial injury.  >=15 - Abnormal Male indicating possible myocardial injury.   Clinicians would have to utilize clinical acumen, EKG, Troponin, and serial changes to determine if it is an Acute Myocardial Infarction or myocardial injury due to an underlying chronic condition.                XR Chest 1 View    Result Date: 4/19/2023  PROCEDURE: XR CHEST 1 VW-    HISTORY: Chest Pain Triage Protocol, unspecified chest pain  COMPARISON: None.  FINDINGS: The heart is normal in size. The mediastinum is unremarkable. There is evidence of old calcified granulomatous disease.  The lungs are clear. There is no pneumothorax. There are no acute osseous abnormalities.      Impression: No acute cardiopulmonary process.        Images were reviewed, interpreted, and dictated by Dr. Lisette Mcintyre MD Transcribed by Beata Lerner PA-C.  This report was signed and finalized on 4/19/2023 2:48 PM by Lisette Mcintyre MD.         MDM    Initial impression of presenting illness: 43-year-old male presents emergency room today for evaluation of chest pain.  Denies any current shortness of air.    DDX: includes but is not limited to: Costochondritis, pericarditis, pneumothorax, GERD, peptic ulcer disease, ACS, pulmonary embolism, ammonia, COPD exacerbation, among other etiology    Patient arrives hemodynamically stable, mildly tachycardic to a rate of 113, nonhypoxic, afebrile, nontoxic-appearing with vitals interpreted by myself. "     Pertinent features from physical exam: There are faint wheezes present throughout the lung fields, cardiac auscultation with tachycardic rate, regular rhythm.  2+ radial pulses bilaterally.  No evidence of lower extremity swelling or tenderness to palpation of calfs.  No unilateral leg swelling noted.  Abdominal exam soft without any rebound guarding or rigidity.  There is no tenderness in the epigastric region..    Initial diagnostic plan: CBC, CMP, EKG, D-dimer, troponin, chest x-ray    Results from initial plan were reviewed and interpreted by me revealing CBC unremarkable, CMP with a mild nonspecific elevation in ALT, glucose 109, otherwise unremarkable, D-dimer negative, initial troponin negative.  EKG revealed sinus tachycardia, rate of 108, nonspecific diffuse T wave changes, no ectopy, no ischemic changes per attending interpretation.  Heart score 4.  Will obtain delta troponin.  Plain film of the chest with no acute cardiopulmonary process per radiology interpretation, personally evaluated this plain film and concur with radiology interpretation.  Repeat troponin within normal limits.    Diagnostic information from other sources: Chart review of prior imaging included.    Interventions / Re-evaluation: Given GI cocktail and 325 mg aspirin on arrival.  Patient reports improvement in his symptoms.  Believe he is stable for discharge home.  Given improvement in symptoms, this could be more related to PUD or GERD.  However, believe he should follow-up with cardiology outpatient closely for further work-up if indicated.    Results/clinical rationale were discussed with patient at bedside    Consultations/Discussion of results with other physicians: Discussed with attending physician prior to discharge.    Disposition plan: Discharge home.  Cardiology and primary care follow-up.  Strict return precautions given.  -----    Final diagnoses:   Chest pain, unspecified type        Bello Troncoso PA-C  04/19/23  [id number]

## 2023-04-21 ENCOUNTER — OFFICE VISIT (OUTPATIENT)
Dept: FAMILY MEDICINE CLINIC | Facility: CLINIC | Age: 43
End: 2023-04-21
Payer: COMMERCIAL

## 2023-04-21 VITALS
TEMPERATURE: 97 F | RESPIRATION RATE: 16 BRPM | BODY MASS INDEX: 33.99 KG/M2 | HEART RATE: 83 BPM | WEIGHT: 204 LBS | OXYGEN SATURATION: 98 % | DIASTOLIC BLOOD PRESSURE: 88 MMHG | SYSTOLIC BLOOD PRESSURE: 126 MMHG | HEIGHT: 65 IN

## 2023-04-21 DIAGNOSIS — R73.01 IMPAIRED FASTING GLUCOSE: ICD-10-CM

## 2023-04-21 DIAGNOSIS — R07.9 CHEST PAIN, UNSPECIFIED TYPE: Primary | ICD-10-CM

## 2023-04-21 DIAGNOSIS — K21.9 GERD WITHOUT ESOPHAGITIS: ICD-10-CM

## 2023-04-21 DIAGNOSIS — I10 ESSENTIAL HYPERTENSION: ICD-10-CM

## 2023-04-21 DIAGNOSIS — R63.1 POLYDIPSIA: ICD-10-CM

## 2023-04-21 DIAGNOSIS — F17.200 TOBACCO DEPENDENCE: ICD-10-CM

## 2023-04-21 DIAGNOSIS — N52.9 VASCULOGENIC ERECTILE DYSFUNCTION, UNSPECIFIED VASCULOGENIC ERECTILE DYSFUNCTION TYPE: ICD-10-CM

## 2023-04-21 DIAGNOSIS — R63.2 POLYPHAGIA: ICD-10-CM

## 2023-04-21 LAB
EXPIRATION DATE: NORMAL
HBA1C MFR BLD: 5.8 %
Lab: NORMAL

## 2023-04-21 RX ORDER — CLOTRIMAZOLE AND BETAMETHASONE DIPROPIONATE 10; .64 MG/G; MG/G
1 CREAM TOPICAL 2 TIMES DAILY
Qty: 45 G | Refills: 1 | Status: SHIPPED | OUTPATIENT
Start: 2023-04-21

## 2023-04-21 RX ORDER — NITROGLYCERIN 0.4 MG/1
0.4 TABLET SUBLINGUAL
Qty: 25 TABLET | Refills: 2 | Status: SHIPPED | OUTPATIENT
Start: 2023-04-21

## 2023-04-21 RX ORDER — TADALAFIL 5 MG/1
5 TABLET ORAL
Qty: 90 TABLET | Refills: 3
Start: 2023-04-21

## 2023-04-21 NOTE — PROGRESS NOTES
Established Patient          Chief Complaint:   Chief Complaint   Patient presents with   • Follow-up     To ed visit        Ronnie Reveles is a 43 y.o. male    History of Present Illness:   I have reviewed labs/imaging/records from recent ER visit, including ER staff and admitting/attending physicians H/P's and progress notes to establish a comprehensive understanding of this patient's clinical hospital course, as well as to establish a transition of care appropriately.    Patient denies any active chest pain.  Denies syncope, vertigo or palpitations.  Maintains an active lifestyle, balanced dietary intake.  He does report noticeable polyphasia, polydipsia in addition to noted impaired fasting glucose on lab evaluation.  He denies any epistaxis or hemoptysis.  Denies any hematuria.  Denies any BRB/BTS.      Subjective     The following portions of the patient's history were reviewed and updated as appropriate: allergies, current medications, past family history, past medical history, past social history, past surgical history and problem list.    Allergies   Allergen Reactions   • Sulfa Antibiotics Hives   • Tramadol Hives   • Zoloft [Sertraline] Irritability       Review of Systems  1. Constitutional: Negative for fever. Negative for chills, diaphoresis, fatigue and unexpected weight change.   2. HENT: No dysphagia; no changes to vision/hearing/smell/taste; no epistaxis  3. Eyes: Negative for redness and visual disturbance.   4. Respiratory: negative for shortness of breath. Negative for chest pain . Negative for cough and chest tightness.  Otherwise, as per above.  5. Cardiovascular: As per above.  6. Gastrointestinal: Denies BRB/BTS.  Denies abdominal discomfort.  7. Endocrine: Negative for cold intolerance and heat intolerance.   8. Genitourinary: No hematuria or dysuria.  9. Musculoskeletal: Chronic cervical spasticity and discomfort.  10. Skin: Negative for color change, rash and  "wound.   11. Neurological: Negative for syncope, weakness and headaches.   12. Hematological: Negative for adenopathy. Does not bruise/bleed easily.   13. Psychiatric/Behavioral: Negative for confusion. The patient is not nervous/anxious.    Objective     Physical Exam   Vital Signs: /88   Pulse 83   Temp 97 °F (36.1 °C)   Resp 16   Ht 165.1 cm (65\")   Wt 92.5 kg (204 lb)   SpO2 98%   BMI 33.95 kg/m²     General Appearance: alert, oriented x 3, no acute distress.  Skin: warm and dry.   HEENT: Atraumatic.  pupils round and reactive to light and accommodation, oral mucosa pink and moist.  Nares patent without epistaxis.  External auditory canals are patent tympanic membranes intact.  Neck: supple, no JVD, trachea midline.  No thyromegaly.  Diffuse spasticity noted to the paravertebral musculature of the cervical spine.  Impaired range of motion testing on sidebending/rotation/flexion/extension, crepitance noted.  Numerous tender points along the right trapezius muscle additionally.  Lungs: CTA, unlabored breathing effort.  Heart: RRR, normal S1 and S2, no S3, no rub.  Abdomen: soft, non-tender, no palpable bladder, present bowel sounds to auscultation ×4.  No guarding or rigidity.  No CVA tenderness; mild epigastric discomfort; post-surgical scarring noted.  Extremities: no clubbing, cyanosis or edema.  Impaired range of motion testing to the cervical spine as per above.  Symmetric muscle strength and development  Neuro: normal speech and mental status.  Cranial nerves II through XII intact.  No anosmia. DTR 2+; proprioception intact.  No focal motor/sensory deficits.      Assessment and Plan      Assessment/Plan:   Diagnoses and all orders for this visit:    1. Chest pain, unspecified type (Primary)  -     nitroglycerin (NITROSTAT) 0.4 MG SL tablet; Place 1 tablet under the tongue Every 5 (Five) Minutes As Needed for Chest Pain. Take no more than 3 doses in 15 minutes.  Dispense: 25 tablet; Refill: 2  -  "    Ambulatory Referral to Cardiology    2. Essential hypertension  -     Ambulatory Referral to Cardiology    3. GERD without esophagitis    4. Polyphagia  -     POC Glycosylated Hemoglobin (Hb A1C)    5. Polydipsia  -     POC Glycosylated Hemoglobin (Hb A1C)    6. Impaired fasting glucose  -     POC Glycosylated Hemoglobin (Hb A1C)    7. Tobacco dependence  -     Ambulatory Referral to Cardiology    8. Vasculogenic erectile dysfunction, unspecified vasculogenic erectile dysfunction type  -     tadalafil (CIALIS) 5 MG tablet; Take 1 tablet by mouth Every Other Day As Needed for Erectile Dysfunction.  Dispense: 90 tablet; Refill: 3      Patient needs risk stratification, referral to cardiology has been placed, urgent evaluation needed.  I have provided him with a prescription for nitroglycerin.    Vital signs demonstrate hemodynamic stability.    Normal hemoglobin A1c.  I have stressed the importance of adequate hydration, as well as avoidance of prolonged fasting periods.  Maintain healthy dietary choices.    Avoid exertional activities until restratification with cardiology.  He will not utilize any tadalafil additionally until cleared by cardiology.    Discussion Summary:    Discussed plan of care in detail with pt today; pt verb understanding and agrees.    I spent 40 minutes caring for Ronnie on this date of service. This time includes time spent by me in the following activities:preparing for the visit, performing a medically appropriate examination and/or evaluation , counseling and educating the patient/family/caregiver, ordering medications, tests, or procedures, referring and communicating with other health care professionals , documenting information in the medical record, independently interpreting results and communicating that information with the patient/family/caregiver and care coordination    I have reviewed and updated all copied forward information, as appropriate.  I attest to the accuracy and  relevance of any unchanged information.    Follow up:  No follow-ups on file.     There are no Patient Instructions on file for this visit.    Kentrell Saravia DO  04/21/23  14:04 EDT

## 2023-04-22 LAB
ALBUMIN SERPL-MCNC: 4.8 G/DL (ref 3.5–5.2)
ALBUMIN/GLOB SERPL: 1.8 G/DL
ALP SERPL-CCNC: 92 U/L (ref 39–117)
ALT SERPL-CCNC: 60 U/L (ref 1–41)
AST SERPL-CCNC: 34 U/L (ref 1–40)
BASOPHILS # BLD AUTO: 0.06 10*3/MM3 (ref 0–0.2)
BASOPHILS NFR BLD AUTO: 0.7 % (ref 0–1.5)
BILIRUB SERPL-MCNC: <0.2 MG/DL (ref 0–1.2)
BUN SERPL-MCNC: 15 MG/DL (ref 6–20)
BUN/CREAT SERPL: 17.9 (ref 7–25)
CALCIUM SERPL-MCNC: 10.5 MG/DL (ref 8.6–10.5)
CHLORIDE SERPL-SCNC: 100 MMOL/L (ref 98–107)
CHOLEST SERPL-MCNC: 256 MG/DL (ref 0–200)
CO2 SERPL-SCNC: 27.2 MMOL/L (ref 22–29)
CREAT SERPL-MCNC: 0.84 MG/DL (ref 0.76–1.27)
EGFRCR SERPLBLD CKD-EPI 2021: 109.6 ML/MIN/1.73
EOSINOPHIL # BLD AUTO: 0.17 10*3/MM3 (ref 0–0.4)
EOSINOPHIL NFR BLD AUTO: 2 % (ref 0.3–6.2)
ERYTHROCYTE [DISTWIDTH] IN BLOOD BY AUTOMATED COUNT: 13.1 % (ref 12.3–15.4)
GLOBULIN SER CALC-MCNC: 2.6 GM/DL
GLUCOSE SERPL-MCNC: 92 MG/DL (ref 65–99)
HCT VFR BLD AUTO: 41.9 % (ref 37.5–51)
HDLC SERPL-MCNC: 46 MG/DL (ref 40–60)
HGB BLD-MCNC: 14.2 G/DL (ref 13–17.7)
IMM GRANULOCYTES # BLD AUTO: 0.05 10*3/MM3 (ref 0–0.05)
IMM GRANULOCYTES NFR BLD AUTO: 0.6 % (ref 0–0.5)
LDLC SERPL CALC-MCNC: 110 MG/DL (ref 0–100)
LYMPHOCYTES # BLD AUTO: 3.62 10*3/MM3 (ref 0.7–3.1)
LYMPHOCYTES NFR BLD AUTO: 42.4 % (ref 19.6–45.3)
MCH RBC QN AUTO: 30 PG (ref 26.6–33)
MCHC RBC AUTO-ENTMCNC: 33.9 G/DL (ref 31.5–35.7)
MCV RBC AUTO: 88.6 FL (ref 79–97)
MONOCYTES # BLD AUTO: 0.57 10*3/MM3 (ref 0.1–0.9)
MONOCYTES NFR BLD AUTO: 6.7 % (ref 5–12)
NEUTROPHILS # BLD AUTO: 4.07 10*3/MM3 (ref 1.7–7)
NEUTROPHILS NFR BLD AUTO: 47.6 % (ref 42.7–76)
NRBC BLD AUTO-RTO: 0 /100 WBC (ref 0–0.2)
PLATELET # BLD AUTO: 368 10*3/MM3 (ref 140–450)
POTASSIUM SERPL-SCNC: 4.2 MMOL/L (ref 3.5–5.2)
PROT SERPL-MCNC: 7.4 G/DL (ref 6–8.5)
RBC # BLD AUTO: 4.73 10*6/MM3 (ref 4.14–5.8)
SODIUM SERPL-SCNC: 141 MMOL/L (ref 136–145)
T4 FREE SERPL-MCNC: 1.15 NG/DL (ref 0.93–1.7)
TRIGL SERPL-MCNC: 579 MG/DL (ref 0–150)
TSH SERPL DL<=0.005 MIU/L-ACNC: 1.97 UIU/ML (ref 0.27–4.2)
VLDLC SERPL CALC-MCNC: 100 MG/DL (ref 5–40)
WBC # BLD AUTO: 8.54 10*3/MM3 (ref 3.4–10.8)

## 2023-05-01 NOTE — PROGRESS NOTES
"     [facility] Cardiology Office Consult Note    Ronnie Reveles  [id number]  05/03/2023    Referred By: No ref. provider found    Chief Complaint: CP, Palpitations  (Urgent per Dr. Saravia)    History of Present Illness:   Mr. Ronnie Reveles is a 43 y.o. male who presents to the Cardiology Clinic for evaluation of chest pain.  The patient has a past medical history of hypertension, hyperlipidemia, emphysema, anxiety, and ongoing tobacco use.  Approximately 2 weeks ago, the patient reported to the ED with chest pain, shortness of breath, and palpitations.  Normal troponin, no acute findings on CXR, ECG showed sinus tachycardia (108 bpm).  He presents today for evaluation.  Approximately 2 weeks ago the patient developed midsternal chest discomfort that radiated to his left arm while he was walking.  This lasted about a minute or so and is described by the patient as sharp, pressure, tightness at which time he rated it 10 out of 10 on the pain scale.  He reports associated symptoms of diaphoresis and dyspnea.  There are no known relieving or exacerbating factors.  He also reports dyspnea that occurs with walking that occurred approximately 2 weeks ago and lasted about 15 minutes.  He also notes that he experienced palpitations at some point before he had chest pain a couple weeks ago.  He reports that chest pain is nonexertional but can occur with stress.  This is happened 3 or 4 times since his hospital visit on 419.  He also reports feeling a \"flutter\" or a fast heartbeat at which time his chest feels \"funny\".  The patient is unable to identify a frequency or duration of this.  Finally he reports pain in his ankles and thighs which can be uncomfortable if he sits for a long period of time.  He currently smokes half pack of cigarettes per day, but also vapes.  Family history is significant for 1 sister who has PAD and 1 uncle with atrial fibrillation and history of MI.  He offers no other complaints " or concerns at this time.    Past Cardiac Testin. Last Coronary Angio: None  2. Prior Stress Testin years ago at [facility]  3. Last Echo: None  4. Prior Holter Monitor: None    Review of Systems:   Review of Systems   As Noted in HPI.   I have reviewed and confirmed the accuracy of the ROS as documented by the MA/LPN/RN HAKEEM Cantor      Past Medical History:   Past Medical History:   Diagnosis Date   • Abnormal finding of lung    • Emphysema, unspecified    • DAKOTA (generalized anxiety disorder)    • Hyperlipidemia    • Hypertension    • MDD (major depressive disorder)    • Migraine    • Obesity    • Tobacco abuse        Past Surgical History:   Past Surgical History:   Procedure Laterality Date   • APPENDECTOMY     • CHOLECYSTECTOMY         Family History:   Family History   Problem Relation Age of Onset   • Arthritis Mother    • Migraine headaches Mother        Social History:   Social History     Socioeconomic History   • Marital status:    Tobacco Use   • Smoking status: Every Day     Packs/day: 1.00     Years: 30.00     Pack years: 30.00     Types: Cigarettes     Start date: 1984     Passive exposure: Current   • Smokeless tobacco: Never   Vaping Use   • Vaping Use: Never used   Substance and Sexual Activity   • Alcohol use: Never   • Drug use: Never   • Sexual activity: Yes       Medications:     Current Outpatient Medications:   •  Aimovig 140 MG/ML auto-injector, INJECT 1 ML UNDER THE SKIN INTO THE APPROPRIATE AREA AS DIRECTED EVERY 30 DAYS, Disp: 1 mL, Rfl: 0  •  albuterol sulfate  (90 Base) MCG/ACT inhaler, Inhale 2 puffs 4 (Four) Times a Day., Disp: 18 g, Rfl: 1  •  clotrimazole-betamethasone (Lotrisone) 1-0.05 % cream, Apply 1 application topically to the appropriate area as directed 2 (Two) Times a Day., Disp: 45 g, Rfl: 1  •  diazePAM (Valium) 2 MG tablet, Take 1.5 tablets by mouth Every 12 (Twelve) Hours As Needed for Anxiety., Disp: 60 tablet, Rfl: 0  •   "ibuprofen (ADVIL,MOTRIN) 600 MG tablet, Take 1 tablet by mouth Every 6 (Six) Hours As Needed for Headache., Disp: 30 tablet, Rfl: 2  •  lisinopril (PRINIVIL,ZESTRIL) 10 MG tablet, TAKE 1 TABLET BY MOUTH DAILY, Disp: 90 tablet, Rfl: 2  •  Loratadine 10 MG capsule, Take  by mouth., Disp: , Rfl:   •  nitroglycerin (NITROSTAT) 0.4 MG SL tablet, Place 1 tablet under the tongue Every 5 (Five) Minutes As Needed for Chest Pain. Take no more than 3 doses in 15 minutes., Disp: 25 tablet, Rfl: 2  •  pantoprazole (PROTONIX) 40 MG EC tablet, Take 1 tablet by mouth Daily., Disp: 90 tablet, Rfl: 2  •  simvastatin (ZOCOR) 10 MG tablet, TAKE 1 TABLET BY MOUTH DAILY, Disp: 90 tablet, Rfl: 2  •  tadalafil (CIALIS) 5 MG tablet, Take 1 tablet by mouth Every Other Day As Needed for Erectile Dysfunction., Disp: 90 tablet, Rfl: 3  •  umeclidinium-vilanterol (ANORO ELLIPTA) 62.5-25 MCG/INH aerosol powder  inhaler, Inhale 1 puff Daily., Disp: , Rfl:   •  vitamin C (ASCORBIC ACID) 250 MG tablet, Take 1 tablet by mouth Daily., Disp: , Rfl:   •  vitamin D3 125 MCG (5000 UT) capsule capsule, Take 1 capsule by mouth Daily., Disp: , Rfl:   •  fenofibrate (TRICOR) 145 MG tablet, Take 1 tablet by mouth Daily., Disp: 30 tablet, Rfl: 1    Allergies:   Allergies   Allergen Reactions   • Atorvastatin Myalgia   • Sulfa Antibiotics Hives   • Tramadol Hives   • Zoloft [Sertraline] Irritability       Physical Exam:  Vital Signs:   Vitals:    05/03/23 1333   BP: 110/70   BP Location: Left arm   Patient Position: Sitting   Pulse: 78   Resp: 16   SpO2: 98%   Weight: 90.7 kg (200 lb)   Height: 165.1 cm (65\")     Body mass index is 33.28 kg/m².    Physical Exam  Vitals and nursing note reviewed.   Constitutional:       General: He is not in acute distress.     Appearance: He is obese.   HENT:      Head: Normocephalic and atraumatic.   Neck:      Trachea: Trachea normal.   Cardiovascular:      Rate and Rhythm: Normal rate and regular rhythm.      Pulses:          "  Popliteal pulses are 1+ on the right side.        Dorsalis pedis pulses are 1+ on the right side.      Heart sounds: Normal heart sounds. No murmur heard.    No friction rub. No gallop.      Comments: Trace edema to bilateral ankles  Pulmonary:      Effort: Pulmonary effort is normal.      Comments: Loud coarse breath sounds on inspiration, decreased in the bases  Musculoskeletal:      Cervical back: Neck supple.      Right lower leg: Edema present.      Left lower leg: Edema present.   Skin:     General: Skin is warm and dry.   Neurological:      Mental Status: He is alert and oriented to person, place, and time.   Psychiatric:         Mood and Affect: Mood normal.         Behavior: Behavior normal. Behavior is cooperative.         Thought Content: Thought content does not include suicidal ideation.       .  Results Review:   I reviewed the patient's new clinical results.    Procedures    Assessment / Plan:   Diagnoses and all orders for this visit:    1. Chest pain, unspecified type (Primary)  Previous ECG showed sinus tachycardia  Multiple risk factors for CAD  Plan for treadmill stress test    2. Primary hypertension  Well-controlled    3. Hyperlipidemia, unspecified hyperlipidemia type  4/23, total cholesterol 256, triglycerides 579, HDL 46,    Start fenofibrate    4. Tobacco dependence  Patient strongly advised to stop smoking    5. Palpitations  4/23, Normal TSH  Plan for outpatient cardiac monitor study to r/o arrhythmia/ectopy    6. Shortness of breath  30+ year history of tobacco use; suspect undiagnosed lung disease but patient declines referral to pulmonologist  Likely deconditioning contributory  Echocardiogram to assess heart structure and function    Preventative Cardiology:   Tobacco Cessation: Cessation Counseling Provided    Advance Care Planning: ACP discussion was declined by the patient. Patient does not have an advance directive, declines further assistance.       Follow Up:   Return in  about 6 weeks (around 6/14/2023) for Follow-up with Dr. Pulliam, Follow-up with Dr. Zurita.      Thank you for allowing me to participate in the care of your patient. Please do not hesitate to contact me with additional questions or concerns.     HAKEEM Cain

## 2023-05-03 ENCOUNTER — OFFICE VISIT (OUTPATIENT)
Dept: CARDIOLOGY | Facility: CLINIC | Age: 43
End: 2023-05-03
Payer: COMMERCIAL

## 2023-05-03 VITALS
DIASTOLIC BLOOD PRESSURE: 70 MMHG | SYSTOLIC BLOOD PRESSURE: 110 MMHG | RESPIRATION RATE: 16 BRPM | WEIGHT: 200 LBS | BODY MASS INDEX: 33.32 KG/M2 | OXYGEN SATURATION: 98 % | HEIGHT: 65 IN | HEART RATE: 78 BPM

## 2023-05-03 DIAGNOSIS — R07.9 CHEST PAIN, UNSPECIFIED TYPE: Primary | ICD-10-CM

## 2023-05-03 DIAGNOSIS — I10 PRIMARY HYPERTENSION: ICD-10-CM

## 2023-05-03 DIAGNOSIS — F17.200 TOBACCO DEPENDENCE: ICD-10-CM

## 2023-05-03 DIAGNOSIS — R00.2 PALPITATIONS: ICD-10-CM

## 2023-05-03 DIAGNOSIS — E78.5 HYPERLIPIDEMIA, UNSPECIFIED HYPERLIPIDEMIA TYPE: ICD-10-CM

## 2023-05-03 RX ORDER — FENOFIBRATE 145 MG/1
145 TABLET, COATED ORAL DAILY
Qty: 30 TABLET | Refills: 1 | Status: SHIPPED | OUTPATIENT
Start: 2023-05-03

## 2023-05-13 DIAGNOSIS — G43.109 MIGRAINE WITH AURA AND WITHOUT STATUS MIGRAINOSUS, NOT INTRACTABLE: ICD-10-CM

## 2023-05-16 RX ORDER — ERENUMAB-AOOE 140 MG/ML
INJECTION, SOLUTION SUBCUTANEOUS
Qty: 1 ML | Refills: 0 | Status: SHIPPED | OUTPATIENT
Start: 2023-05-16

## 2023-06-15 DIAGNOSIS — F40.01 PANIC DISORDER WITH AGORAPHOBIA, MILD AGORAPHOBIC AVOIDANCE AND MODERATE PANIC ATTACKS: ICD-10-CM

## 2023-06-16 RX ORDER — DIAZEPAM 2 MG/1
3 TABLET ORAL EVERY 12 HOURS PRN
Qty: 60 TABLET | Refills: 0 | Status: SHIPPED | OUTPATIENT
Start: 2023-06-16

## 2023-06-16 NOTE — TELEPHONE ENCOUNTER
Rx Refill Note  Requested Prescriptions     Pending Prescriptions Disp Refills   • diazePAM (Valium) 2 MG tablet 60 tablet 0     Sig: Take 1.5 tablets by mouth Every 12 (Twelve) Hours As Needed for Anxiety.      Last office visit with prescribing clinician: 4/21/2023   Last telemedicine visit with prescribing clinician: Visit date not found   Next office visit with prescribing clinician: Visit date not found                         Would you like a call back once the refill request has been completed: [] Yes [] No    If the office needs to give you a call back, can they leave a voicemail: [] Yes [] No    Chey Tai MA  06/16/23, 07:39 EDT

## 2023-07-18 ENCOUNTER — OFFICE VISIT (OUTPATIENT)
Dept: FAMILY MEDICINE CLINIC | Facility: CLINIC | Age: 43
End: 2023-07-18
Payer: COMMERCIAL

## 2023-07-18 VITALS
WEIGHT: 203 LBS | HEIGHT: 65 IN | TEMPERATURE: 97 F | SYSTOLIC BLOOD PRESSURE: 120 MMHG | OXYGEN SATURATION: 91 % | BODY MASS INDEX: 33.82 KG/M2 | HEART RATE: 72 BPM | DIASTOLIC BLOOD PRESSURE: 84 MMHG

## 2023-07-18 DIAGNOSIS — R35.89 POLYURIA: ICD-10-CM

## 2023-07-18 DIAGNOSIS — F40.01 PANIC DISORDER WITH AGORAPHOBIA, MILD AGORAPHOBIC AVOIDANCE AND MODERATE PANIC ATTACKS: ICD-10-CM

## 2023-07-18 DIAGNOSIS — R63.1 POLYDIPSIA: ICD-10-CM

## 2023-07-18 DIAGNOSIS — R14.0 ABDOMINAL DISTENSION: ICD-10-CM

## 2023-07-18 DIAGNOSIS — G43.109 MIGRAINE WITH AURA AND WITHOUT STATUS MIGRAINOSUS, NOT INTRACTABLE: ICD-10-CM

## 2023-07-18 DIAGNOSIS — R63.5 UNINTENDED WEIGHT GAIN: ICD-10-CM

## 2023-07-18 DIAGNOSIS — R63.2 POLYPHAGIA: Primary | ICD-10-CM

## 2023-07-18 DIAGNOSIS — R10.12 ABDOMINAL PAIN, LEFT UPPER QUADRANT: ICD-10-CM

## 2023-07-18 LAB
EXPIRATION DATE: NORMAL
HBA1C MFR BLD: 5.6 %
Lab: NORMAL

## 2023-07-18 PROCEDURE — 99214 OFFICE O/P EST MOD 30 MIN: CPT | Performed by: FAMILY MEDICINE

## 2023-07-18 PROCEDURE — 83036 HEMOGLOBIN GLYCOSYLATED A1C: CPT | Performed by: FAMILY MEDICINE

## 2023-07-18 RX ORDER — VENLAFAXINE HYDROCHLORIDE 37.5 MG/1
37.5 CAPSULE, EXTENDED RELEASE ORAL DAILY
Qty: 90 CAPSULE | Refills: 1 | Status: SHIPPED | OUTPATIENT
Start: 2023-07-18

## 2023-07-18 NOTE — PROGRESS NOTES
Established Patient        Chief Complaint:   Chief Complaint   Patient presents with    Pain     Pt c/o pain in lung area    Back Pain     lower    Obesity     Pt is requesting weight loss assistance, pt stated that he stays hungry         Ronnie Reveles is a 43 y.o. male    History of Present Illness:   Here today for evaluation of continued low back pain, abdominal distention and GI cramping.    He denies any fever, chills or night sweats.  Denies any chest pain, syncope, palpitations or vertigo.  He does admit to polyphasia, as well as polydipsia; he attributes polydipsia to heat/humidity exposure with work-related obligations.  Patient states that is somewhat out of proportion to expectations.  He also explains continued urination.  Denies any hematuria.  Patient states he frequently feels abdominal distention is associated with his continued hunger despite eating.    Subjective     The following portions of the patient's history were reviewed and updated as appropriate: allergies, current medications, past family history, past medical history, past social history, past surgical history and problem list.    Allergies   Allergen Reactions    Fenofibrate Rash    Atorvastatin Myalgia    Sulfa Antibiotics Hives    Tramadol Hives    Zoloft [Sertraline] Irritability       Review of Systems  Constitutional: Negative for fever. Negative for chills, diaphoresis, fatigue and unexpected weight change.   HENT: No dysphagia; no changes to vision/hearing/smell/taste; no epistaxis  Eyes: Negative for redness and visual disturbance.   Respiratory: negative for shortness of breath. Negative for chest pain . Negative for cough and chest tightness.   Cardiovascular: Negative for chest pain and palpitations.   Gastrointestinal: Scribes generalized abdominal bloating, crampy sensations.  No focal areas of tenderness, generalized discomfort.  Endocrine: Negative for cold intolerance and heat intolerance.   "Polyphagia, polydipsia and polyuria symptoms.  Genitourinary: Negative for difficulty urinating, dysuria.  No hematuria.  Musculoskeletal: Negative for arthralgias, back pain and myalgias.   Skin: Negative for color change, rash and wound.   Neurological: Negative for syncope, weakness and headaches.   Hematological: Negative for adenopathy. Does not bruise/bleed easily.   Psychiatric/Behavioral: Negative for confusion. The patient is not nervous/anxious.    Objective     Physical Exam   Vital Signs: /84   Pulse 72   Temp 97 øF (36.1 øC)   Ht 165.1 cm (65\")   Wt 92.1 kg (203 lb)   SpO2 91%   BMI 33.78 kg/mý       Patient's (Body mass index is 33.78 kg/mý.) indicates that they are obese (BMI >30) with health related conditions that include hypertension, dyslipidemias, and GERD . Weight is unchanged. BMI is is above average; BMI management plan is completed. We discussed low calorie, low carb based diet program, portion control, increasing exercise, joining a fitness center or start home based exercise program, and Weight Watchers or other Commercial based weight reduction program.      General Appearance: alert, oriented x 3, no acute distress.  Skin: warm and dry.   HEENT: Atraumatic.  pupils round and reactive to light and accommodation, oral mucosa pink and moist.  Nares patent without epistaxis.  External auditory canals are patent tympanic membranes intact.  Neck: supple, no JVD, trachea midline.  No thyromegaly  Lungs: CTA, unlabored breathing effort.  Heart: RRR, normal S1 and S2, no S3, no rub.  Abdomen: soft, non-tender, no palpable bladder, present bowel sounds to auscultation x4.  No guarding or rigidity.  Extremities: no clubbing, cyanosis or edema.  Good range of motion actively and passively.  Symmetric muscle strength and development  Neuro: normal speech and mental status.  Cranial nerves II through XII intact.  No anosmia. DTR 2+; proprioception intact.  No focal motor/sensory " deficits.    Advance Care Planning   ACP discussion was held with the patient during this visit. Patient does not have an advance directive, information provided.       Assessment and Plan      Assessment/Plan:   Diagnoses and all orders for this visit:    1. Polyphagia (Primary)  -     POC Glycosylated Hemoglobin (Hb A1C)  -     US Abdomen Complete    2. Polydipsia  -     POC Glycosylated Hemoglobin (Hb A1C)  -     US Abdomen Complete    3. Polyuria  -     POC Glycosylated Hemoglobin (Hb A1C)  -     US Abdomen Complete    4. Abdominal distension  -     US Abdomen Complete    5. Abdominal pain, left upper quadrant  -     US Abdomen Complete    6. Unintended weight gain  -     US Abdomen Complete    7. Panic disorder with agoraphobia, mild agoraphobic avoidance and moderate panic attacks  -     venlafaxine XR (EFFEXOR-XR) 37.5 MG 24 hr capsule; Take 1 capsule by mouth Daily.  Dispense: 90 capsule; Refill: 1    8. Migraine with aura and without status migrainosus, not intractable  -     venlafaxine XR (EFFEXOR-XR) 37.5 MG 24 hr capsule; Take 1 capsule by mouth Daily.  Dispense: 90 capsule; Refill: 1      Start venlafaxine; will follow clinically.  Discussed need for stress/anxiety reducing techniques such as prayer/meditation/breathing and counting exercises and avoidance of stress producing environments/situations; will follow clinically.    Ultrasound of the abdomen ordered, this will need to be done when fasting.    I do suspect patient is suffering from binge eating disorder.  Due to his polyphasia, polyuria and polydipsia, hemoglobin A1c was evaluated today, and was normal.    Continue avoidance of known triggers of migraine headaches.  Stressed importance of adequate hydration, as well as the need to avoid excessive caffeine intake.  Hopeful that the addition of venlafaxine will also aid in reducing frequency/severity of headaches.    Discussion Summary:    Discussed plan of care in detail with pt today; pt verb  understanding and agrees.    I spent 30 minutes caring for Ronnie on this date of service. This time includes time spent by me in the following activities:preparing for the visit, performing a medically appropriate examination and/or evaluation , counseling and educating the patient/family/caregiver, ordering medications, tests, or procedures, documenting information in the medical record, and care coordination    I have reviewed and updated all copied forward information, as appropriate.  I attest to the accuracy and relevance of any unchanged information.    Follow up:  No follow-ups on file.     There are no Patient Instructions on file for this visit.    Kentrell Saravia DO  07/18/23  17:24 EDT

## 2023-07-19 ENCOUNTER — TELEPHONE (OUTPATIENT)
Dept: FAMILY MEDICINE CLINIC | Facility: CLINIC | Age: 43
End: 2023-07-19
Payer: COMMERCIAL

## 2023-07-19 NOTE — TELEPHONE ENCOUNTER
Called patient to give him his US appt information for tomorrow morning. Patient is requesting a new allergy medicine. States that Claritin is not helping him anymore and would like Dr Saravia to send him in a different allergy medication to [facility] [place].

## 2023-08-07 ENCOUNTER — OFFICE VISIT (OUTPATIENT)
Dept: FAMILY MEDICINE CLINIC | Facility: CLINIC | Age: 43
End: 2023-08-07
Payer: COMMERCIAL

## 2023-08-07 VITALS
SYSTOLIC BLOOD PRESSURE: 134 MMHG | OXYGEN SATURATION: 98 % | HEIGHT: 66 IN | HEART RATE: 78 BPM | BODY MASS INDEX: 31.66 KG/M2 | DIASTOLIC BLOOD PRESSURE: 88 MMHG | RESPIRATION RATE: 18 BRPM | TEMPERATURE: 97 F | WEIGHT: 197 LBS

## 2023-08-07 DIAGNOSIS — G43.109 MIGRAINE WITH AURA AND WITHOUT STATUS MIGRAINOSUS, NOT INTRACTABLE: ICD-10-CM

## 2023-08-07 DIAGNOSIS — K76.0 FATTY LIVER: Primary | ICD-10-CM

## 2023-08-07 PROCEDURE — 99214 OFFICE O/P EST MOD 30 MIN: CPT | Performed by: FAMILY MEDICINE

## 2023-08-07 RX ORDER — LORATADINE 10 MG/1
1 CAPSULE, LIQUID FILLED ORAL DAILY
Qty: 90 EACH | Refills: 3 | Status: SHIPPED | OUTPATIENT
Start: 2023-08-07

## 2023-08-07 RX ORDER — ERENUMAB-AOOE 140 MG/ML
140 INJECTION, SOLUTION SUBCUTANEOUS
Qty: 1 ML | Refills: 5 | Status: SHIPPED | OUTPATIENT
Start: 2023-08-07

## 2023-08-08 NOTE — PROGRESS NOTES
Established Patient        Chief Complaint:   Chief Complaint   Patient presents with    Follow-up     Lab results    Med Refill        Ronnie Reveles is a 43 y.o. male    History of Present Illness:   Here today in follow-up of recent medication changes, as well as ultrasound completed of the abdomen.  He denies any fever, chills or night sweats.  He has had numerous dietary modifications in an effort to achieve an overall healthier weight, as well as to improve his gastrointestinal symptoms.  Patient states both have been realized since last visit.  Ultrasound demonstrated no acute findings, however did demonstrate fatty infiltration of the liver.    He maintains good urine output, no hematuria/dysuria.  Denies any BRB/BTS.  He reports periodic loose bowel function, worsened since gastrointestinal surgery.    Patient states that the abdominal bloating symptoms have improved with some dietary changes, however not completely resolved.    Subjective     The following portions of the patient's history were reviewed and updated as appropriate: allergies, current medications, past family history, past medical history, past social history, past surgical history and problem list.    Allergies   Allergen Reactions    Fenofibrate Rash    Atorvastatin Myalgia    Sulfa Antibiotics Hives    Tramadol Hives    Zoloft [Sertraline] Irritability       Review of Systems  Constitutional: Negative for fever. Negative for chills, diaphoresis, fatigue and unexpected weight change.   HENT: No dysphagia; no changes to vision/hearing/smell/taste; no epistaxis  Eyes: Negative for redness and visual disturbance.   Respiratory: negative for shortness of breath. Negative for chest pain . Negative for cough and chest tightness.   Cardiovascular: Negative for chest pain and palpitations.   Gastrointestinal: As per above.  Endocrine: Negative for cold intolerance and heat intolerance.   Genitourinary: Negative for  "difficulty urinating, dysuria and frequency.   Musculoskeletal: Negative for arthralgias, back pain and myalgias.   Skin: Negative for color change, rash and wound.   Neurological: Negative for syncope, weakness and headaches.   Hematological: Negative for adenopathy. Does not bruise/bleed easily.   Psychiatric/Behavioral: Negative for confusion. The patient is not nervous/anxious.    Objective     Physical Exam   Vital Signs: /88   Pulse 78   Temp 97 øF (36.1 øC)   Resp 18   Ht 167.6 cm (66\")   Wt 89.4 kg (197 lb)   SpO2 98%   BMI 31.80 kg/mý   BMI is >= 30 and <35. (Class 1 Obesity). The following options were offered after discussion;: exercise counseling/recommendations and nutrition counseling/recommendations  Patient's (Body mass index is 31.8 kg/mý.) indicates that they are obese (BMI >30) with health related conditions that include hypertension, dyslipidemias, and GERD . Weight is improving with lifestyle modifications. BMI is is above average; BMI management plan is completed. We discussed low calorie, low carb based diet program, portion control, increasing exercise, joining a fitness center or start home based exercise program, and Weight Watchers or other Commercial based weight reduction program.      General Appearance: alert, oriented x 3, no acute distress.  Skin: warm and dry.   HEENT: Atraumatic.  pupils round and reactive to light and accommodation, oral mucosa pink and moist.  Nares patent without epistaxis.  External auditory canals are patent tympanic membranes intact.  Neck: supple, no JVD, trachea midline.  No thyromegaly  Lungs: CTA, unlabored breathing effort.  Heart: RRR, normal S1 and S2, no S3, no rub.  Abdomen: soft, non-tender, no palpable bladder, present bowel sounds to auscultation x4.  No guarding or rigidity.  No CVA tenderness.  Extremities: no clubbing, cyanosis or edema.  Good range of motion actively and passively.  Symmetric muscle strength and " development  Neuro: normal speech and mental status.  Cranial nerves II through XII intact.  No anosmia. DTR 2+; proprioception intact.  No focal motor/sensory deficits.    Advance Care Planning   ACP discussion was held with the patient during this visit. Patient does not have an advance directive, information provided.       Assessment and Plan      Assessment/Plan:   Diagnoses and all orders for this visit:    1. Fatty liver (Primary)  -     Ambulatory Referral to Gastroenterology    2. Migraine with aura and without status migrainosus, not intractable  -     Erenumab-aooe (Aimovig) 140 MG/ML auto-injector; Inject 1 mL under the skin into the appropriate area as directed Every 30 (Thirty) Days.  Dispense: 1 mL; Refill: 5    Other orders  -     Loratadine 10 MG capsule; Take 1 capsule by mouth Daily.  Dispense: 90 each; Refill: 3      Continue avoidance of known migraine headache triggers.  I stressed the importance of adequate hydration, as well as appropriate rest cycles.  Avoid prolonged fasting periods as best able.  Avoid excessive caffeine intake.    Refill provided for loratadine today.    I have placed a referral to be established with gastroenterology concerning incidental findings of fatty liver changes on ultrasound of the abdomen.  Patient has already begun utilization of improved dietary intake, I discussed additional options with him in detail today.  Stressed importance of adequate hydration additionally.  Pleased with patient's weight loss, with effort, realizing his last visit.    Vital signs demonstrate hemodynamic stability.        Discussion Summary:    Discussed plan of care in detail with pt today; pt verb understanding and agrees.    I spent 30 minutes caring for Ronnie on this date of service. This time includes time spent by me in the following activities:preparing for the visit, reviewing tests, performing a medically appropriate examination and/or evaluation , counseling and educating  the patient/family/caregiver, ordering medications, tests, or procedures, referring and communicating with other health care professionals , documenting information in the medical record, and care coordination    I have reviewed and updated all copied forward information, as appropriate.  I attest to the accuracy and relevance of any unchanged information.    Follow up:  No follow-ups on file.     There are no Patient Instructions on file for this visit.    Kentrell Saravia DO  08/07/23  23:25 EDT

## 2023-08-18 ENCOUNTER — TELEPHONE (OUTPATIENT)
Dept: CARDIOLOGY | Facility: CLINIC | Age: 43
End: 2023-08-18
Payer: COMMERCIAL

## 2023-08-18 NOTE — TELEPHONE ENCOUNTER
"----- Message from Socorro Lou MA sent at 8/17/2023  8:13 AM EDT -----  Regarding: FW: Cholesterol   Contact: [phone]    ----- Message -----  From: Ronnie Reveles \"Maxx\"  Sent: 8/16/2023   5:53 PM EDT  To: [facility]  Subject: Cholesterol                                      My wife called Monday about my cholesterol medication that the Drug store said they were in the same class of medication and she don't see why I would be given them so I need something for my cholesterol I am taking the one u sent in so I need something to go with it because it's really high..They say both cholesterol medication are really low doses I use [facility] in [place] please let me know thank you     "

## 2023-08-18 NOTE — TELEPHONE ENCOUNTER
I don't understand this message.    I discontinued fenofibrate because he said it gave him a rash.  I am not sure about that but I did add it to his allergy list and took it off his med list.  I switched his low intensity statin to a higher intensity statin so simvastatin was removed and rosuvastatin was added.  We discussed these changes in the office and at that time he was comfortable with this plan.  He is welcome to follow-up with his PCP as scheduled in November or he can discuss further therapy with Dr. Pulliam next month.  No new orders.      4/23, triglycerides 579, HDL 46,

## 2023-08-29 ENCOUNTER — PRIOR AUTHORIZATION (OUTPATIENT)
Dept: FAMILY MEDICINE CLINIC | Facility: CLINIC | Age: 43
End: 2023-08-29
Payer: COMMERCIAL

## 2023-08-29 NOTE — TELEPHONE ENCOUNTER
A PRIOR AUTH HAS BEEN STARTED THROUGH [employer] FOR AIMOVIG.      WAITING ON A RESPONSE FROM THE INSURANCE.    Key: [id number]

## 2023-08-30 DIAGNOSIS — F40.01 PANIC DISORDER WITH AGORAPHOBIA, MILD AGORAPHOBIC AVOIDANCE AND MODERATE PANIC ATTACKS: ICD-10-CM

## 2023-08-31 NOTE — TELEPHONE ENCOUNTER
Rx Refill Note  Requested Prescriptions     Pending Prescriptions Disp Refills    diazePAM (Valium) 2 MG tablet 60 tablet 0     Sig: Take 1.5 tablets by mouth Every 12 (Twelve) Hours As Needed for Anxiety.      Last office visit with prescribing clinician: 8/7/2023   Last telemedicine visit with prescribing clinician: Visit date not found   Next office visit with prescribing clinician: 11/7/2023                         Would you like a call back once the refill request has been completed: [] Yes [] No    If the office needs to give you a call back, can they leave a voicemail: [] Yes [] No    Chey Tai MA  08/31/23, 14:37 EDT

## 2023-09-05 RX ORDER — DIAZEPAM 2 MG/1
3 TABLET ORAL EVERY 12 HOURS PRN
Qty: 60 TABLET | Refills: 0 | Status: SHIPPED | OUTPATIENT
Start: 2023-09-05

## 2023-09-08 ENCOUNTER — OFFICE VISIT (OUTPATIENT)
Dept: CARDIOLOGY | Facility: CLINIC | Age: 43
End: 2023-09-08
Payer: COMMERCIAL

## 2023-09-08 VITALS
HEART RATE: 77 BPM | OXYGEN SATURATION: 98 % | WEIGHT: 193 LBS | SYSTOLIC BLOOD PRESSURE: 122 MMHG | HEIGHT: 66 IN | RESPIRATION RATE: 18 BRPM | DIASTOLIC BLOOD PRESSURE: 78 MMHG | BODY MASS INDEX: 31.02 KG/M2

## 2023-09-08 DIAGNOSIS — F41.1 GENERALIZED ANXIETY DISORDER: ICD-10-CM

## 2023-09-08 DIAGNOSIS — R07.2 PRECORDIAL PAIN: Primary | ICD-10-CM

## 2023-09-08 DIAGNOSIS — F17.200 TOBACCO DEPENDENCE: ICD-10-CM

## 2023-09-08 DIAGNOSIS — E78.5 DYSLIPIDEMIA: ICD-10-CM

## 2023-09-08 DIAGNOSIS — I10 ESSENTIAL HYPERTENSION: ICD-10-CM

## 2023-09-08 PROCEDURE — 99213 OFFICE O/P EST LOW 20 MIN: CPT | Performed by: INTERNAL MEDICINE

## 2023-09-08 NOTE — PROGRESS NOTES
"    Subjective:     Encounter Date:09/08/2023      Patient ID: Ronnie Reveles is a 43 y.o. male.    Chief Complaint: Chest pain  HPI  This is a 43-year-old male patient who presents to cardiology clinic to discuss results of outpatient testing.  The patient had a normal treadmill exercise stress test.  He exercised to target heart rate without chest discomfort or ischemic EKG changes.  Heart rate and blood pressure response to exercise was normal and there was no exercise-induced arrhythmia.  The patient also wore an outpatient cardiac monitor showing no evidence of arrhythmia or frequent/complex atrial or ventricular ectopy.  Since his initial evaluation he indicates that all of his symptoms have spontaneously resolved without specific intervention.  He reports adopting a \"Mediterranean-style\" diet.  He is trying to exercise more regularly.  He has undergone a ultrasound of the abdomen demonstrating hepatomegaly with fairly significant changes concerning for fatty liver.  Unfortunately, he continues to smoke daily.  The following portions of the patient's history were reviewed and updated as appropriate: allergies, current medications, past family history, past medical history, past social history, past surgical history and problem  Review of Systems   Constitutional: Negative for chills, diaphoresis, fever, malaise/fatigue, weight gain and weight loss.   HENT:  Negative for ear discharge, hearing loss, hoarse voice and nosebleeds.    Eyes:  Negative for discharge, double vision, pain and photophobia.   Cardiovascular:  Negative for chest pain, claudication, cyanosis, dyspnea on exertion, irregular heartbeat, leg swelling, near-syncope, orthopnea, palpitations, paroxysmal nocturnal dyspnea and syncope.   Respiratory:  Negative for cough, hemoptysis, sputum production and wheezing.    Endocrine: Negative for cold intolerance, heat intolerance, polydipsia, polyphagia and polyuria.   Hematologic/Lymphatic: Negative " for adenopathy and bleeding problem. Does not bruise/bleed easily.   Skin:  Negative for color change, flushing, itching and rash.   Musculoskeletal:  Negative for muscle cramps, muscle weakness, myalgias and stiffness.   Gastrointestinal:  Negative for abdominal pain, diarrhea, hematemesis, hematochezia, nausea and vomiting.   Genitourinary:  Negative for dysuria, frequency and nocturia.   Neurological:  Negative for focal weakness, loss of balance, numbness, paresthesias and seizures.   Psychiatric/Behavioral:  Negative for altered mental status, hallucinations and suicidal ideas.    Allergic/Immunologic: Negative for HIV exposure, hives and persistent infections.         Current Outpatient Medications:     albuterol sulfate  (90 Base) MCG/ACT inhaler, Inhale 2 puffs 4 (Four) Times a Day., Disp: 18 g, Rfl: 1    clotrimazole-betamethasone (Lotrisone) 1-0.05 % cream, Apply 1 application topically to the appropriate area as directed 2 (Two) Times a Day., Disp: 45 g, Rfl: 1    diazePAM (Valium) 2 MG tablet, Take 1.5 tablets by mouth Every 12 (Twelve) Hours As Needed for Anxiety., Disp: 60 tablet, Rfl: 0    Erenumab-aooe (Aimovig) 140 MG/ML auto-injector, Inject 1 mL under the skin into the appropriate area as directed Every 30 (Thirty) Days., Disp: 1 mL, Rfl: 5    ibuprofen (ADVIL,MOTRIN) 600 MG tablet, Take 1 tablet by mouth Every 6 (Six) Hours As Needed for Headache., Disp: 30 tablet, Rfl: 2    lisinopril (PRINIVIL,ZESTRIL) 10 MG tablet, TAKE 1 TABLET BY MOUTH DAILY, Disp: 90 tablet, Rfl: 2    nitroglycerin (NITROSTAT) 0.4 MG SL tablet, Place 1 tablet under the tongue Every 5 (Five) Minutes As Needed for Chest Pain. Take no more than 3 doses in 15 minutes., Disp: 25 tablet, Rfl: 2    pantoprazole (PROTONIX) 40 MG EC tablet, Take 1 tablet by mouth Daily., Disp: 90 tablet, Rfl: 2    rosuvastatin (CRESTOR) 10 MG tablet, Take 1 tablet by mouth Daily., Disp: 30 tablet, Rfl: 11    tadalafil (CIALIS) 5 MG tablet,  "Take 1 tablet by mouth Every Other Day As Needed for Erectile Dysfunction., Disp: 90 tablet, Rfl: 3    umeclidinium-vilanterol (ANORO ELLIPTA) 62.5-25 MCG/INH aerosol powder  inhaler, Inhale 1 puff Daily., Disp: , Rfl:     venlafaxine XR (EFFEXOR-XR) 37.5 MG 24 hr capsule, Take 1 capsule by mouth Daily., Disp: 90 capsule, Rfl: 1    vitamin C (ASCORBIC ACID) 250 MG tablet, Take 1 tablet by mouth Daily., Disp: , Rfl:     vitamin D3 125 MCG (5000 UT) capsule capsule, Take 1 capsule by mouth Daily., Disp: , Rfl:     Objective:   Vitals and nursing note reviewed.   Constitutional:       Appearance: Healthy appearance. Not in distress.   Neck:      Vascular: No JVR. JVD normal.   Pulmonary:      Effort: Pulmonary effort is normal.      Breath sounds: Normal breath sounds. No wheezing. No rhonchi. No rales.   Chest:      Chest wall: Not tender to palpatation.   Cardiovascular:      PMI at left midclavicular line. Normal rate. Regular rhythm. Normal S1. Normal S2.       Murmurs: There is no murmur.      No gallop.  No click. No rub.   Pulses:     Intact distal pulses.   Edema:     Peripheral edema absent.   Abdominal:      General: Bowel sounds are normal.      Palpations: Abdomen is soft.      Tenderness: There is no abdominal tenderness.   Musculoskeletal: Normal range of motion.         General: No tenderness. Skin:     General: Skin is warm and dry.   Neurological:      General: No focal deficit present.      Mental Status: Alert and oriented to person, place and time.     Blood pressure 122/78, pulse 77, resp. rate 18, height 167.6 cm (66\"), weight 87.5 kg (193 lb), SpO2 98 %.   Lab Review:     Assessment:       1. Precordial pain  Noncardiac chest pain.  No evidence of ischemic heart disease.  Spontaneous resolution.    2. Tobacco dependence  Ongoing daily cigarette smoking.    3. Dyslipidemia  Tolerating statin based cholesterol-lowering therapy without side effects.    4. Essential hypertension  Acceptable blood " "pressure control.    5. Generalized anxiety disorder      Procedures    Plan:     Unfortunately, the epic based algorithm to detect ongoing cigarette smoking and electronically triggers smoking cessation counseling failed to identify that the patient was an active smoker.  This is of particular concern as this is a \"tracked\" metric used for quality purposes.  The patient has been counseled regarding the essential need to discontinue cigarette smoking.    The patient has been congratulated on his efforts to change to a heart healthy diet and adopt an active lifestyle outside of work.    The patient has been reassured regarding the benign nature of his cardiac test results.    No changes in medications have been made at today's visit.    No further cardiovascular testing is warranted.      "

## 2023-09-13 ENCOUNTER — TELEPHONE (OUTPATIENT)
Dept: FAMILY MEDICINE CLINIC | Facility: CLINIC | Age: 43
End: 2023-09-13

## 2023-09-13 ENCOUNTER — OFFICE VISIT (OUTPATIENT)
Dept: FAMILY MEDICINE CLINIC | Facility: CLINIC | Age: 43
End: 2023-09-13

## 2023-09-13 VITALS
HEIGHT: 66 IN | DIASTOLIC BLOOD PRESSURE: 98 MMHG | WEIGHT: 194 LBS | OXYGEN SATURATION: 96 % | TEMPERATURE: 97.2 F | RESPIRATION RATE: 16 BRPM | HEART RATE: 64 BPM | BODY MASS INDEX: 31.18 KG/M2 | SYSTOLIC BLOOD PRESSURE: 142 MMHG

## 2023-09-13 DIAGNOSIS — I10 ESSENTIAL HYPERTENSION: ICD-10-CM

## 2023-09-13 DIAGNOSIS — R39.11 URINARY HESITANCY: ICD-10-CM

## 2023-09-13 DIAGNOSIS — M54.50 LUMBAR PAIN: Primary | ICD-10-CM

## 2023-09-13 LAB
BILIRUB BLD-MCNC: NEGATIVE MG/DL
CLARITY, POC: CLEAR
COLOR UR: YELLOW
EXPIRATION DATE: ABNORMAL
GLUCOSE UR STRIP-MCNC: NEGATIVE MG/DL
KETONES UR QL: NEGATIVE
LEUKOCYTE EST, POC: NEGATIVE
Lab: ABNORMAL
NITRITE UR-MCNC: NEGATIVE MG/ML
PH UR: 6 [PH] (ref 5–8)
PROT UR STRIP-MCNC: NEGATIVE MG/DL
RBC # UR STRIP: ABNORMAL /UL
SP GR UR: 1.02 (ref 1–1.03)
UROBILINOGEN UR QL: NORMAL

## 2023-09-13 NOTE — PROGRESS NOTES
"                      Established Patient        Chief Complaint:   Chief Complaint   Patient presents with    Tailbone Pain     Pt states he did not fall or do anything but has had this pain for 3 weeks. Pt has not been taking any meds for the pain.            History of Present Illness:    Ronnie Reveles is a 43 y.o. male who presents today for complaints of \"tailbone pain\". Patient reports that he woke up one morning and went to work, noticed it was \"sore\". Denies tenderness to palpation. Denies injury/fall. Onset 3 weeks ago.     Denies difficulty urinating. Has history of orchitis, epididymitis, hematuria. Denies any history of prostate enlargement. Does strain to urinate intermittently.    Reports injury to L4-5 years ago. Has had issues with left sided sciatica in the past.    Patient states that he was taking simvastatin and rosuvastatin for 2 months before pharmacy at [facility] told him he needed to stop the simvastatin. Patient states that [employer] was still filling the medication so he did not know to stop.     Subjective     The following portions of the patient's history were reviewed and updated as appropriate: allergies, current medications, past family history, past medical history, past social history, past surgical history and problem list.    ALLERGIES  Allergies   Allergen Reactions    Fenofibrate Rash    Atorvastatin Myalgia    Sulfa Antibiotics Hives    Tramadol Hives    Zoloft [Sertraline] Irritability       ROS  Review of Systems   Genitourinary:  Positive for difficulty urinating (intermittent). Negative for decreased urine volume, genital sores, penile swelling, scrotal swelling and urgency.   Musculoskeletal:  Positive for back pain and gait problem.   Neurological:  Positive for light-headedness.     Objective     Vital Signs:   /98   Pulse 64   Temp 97.2 °F (36.2 °C)   Resp 16   Ht 167.6 cm (66\")   Wt 88 kg (194 lb)   SpO2 96%   BMI 31.31 kg/m²             Physical Exam "   Physical Exam  Vitals and nursing note reviewed.   Cardiovascular:      Rate and Rhythm: Normal rate and regular rhythm.   Pulmonary:      Effort: Pulmonary effort is normal.      Breath sounds: Normal breath sounds.   Abdominal:      General: Bowel sounds are normal. There is no distension.      Palpations: Abdomen is soft. There is no mass.      Tenderness: There is no abdominal tenderness. There is no guarding.   Genitourinary:     Comments: Deferred at this time  Musculoskeletal:      Lumbar back: Tenderness present. No swelling or spasms.   Neurological:      Mental Status: He is alert and oriented to person, place, and time.   Psychiatric:         Mood and Affect: Mood normal.         Behavior: Behavior normal.       Assessment and Plan      Assessment/Plan:   Diagnoses and all orders for this visit:    1. Lumbar pain (Primary)  -     XR Spine Lumbar 2 or 3 View; Future    2. Urinary hesitancy  -     PSA DIAGNOSTIC  -     POCT urinalysis dipstick, automated    I will contact patient regarding test results and provide instructions regarding any necessary changes in plan of care.    Patient was encouraged to keep me informed of any acute changes, lack of improvement, or any new concerning symptoms.    May alternate OTC tylenol and ibuprofen as needed every 4-6 hours for pain.     Patient voiced understanding of all instructions and denied further questions.  Agreeable to plan of care.       I have reviewed and updated all copied forward information, as appropriate.  I attest to the accuracy and relevance of any unchanged information.      Follow up:  No follow-ups on file.     Patient Education:  There are no Patient Instructions on file for this visit.    HAKEEM Perez  09/25/23  17:12 EDT          Please note that portions of this note may have been completed with a voice recognition program.

## 2023-09-13 NOTE — TELEPHONE ENCOUNTER
Caller: CHAYITO CAMARENA    Relationship: Emergency Contact    Best call back number: [phone]     What was the call regarding: PATIENT IS HAVING PAIN IN THE TAILBONE AREA.  NO KNOWN INJURY.  WOULD LIKE XRAY OR CT TO SEE IF THERE IS SOMETHING GOING ON.      Is it okay if the provider responds through MyChart: [facility]

## 2023-09-15 LAB — PSA SERPL-MCNC: 0.2 NG/ML (ref 0–4)

## 2023-09-25 NOTE — ASSESSMENT & PLAN NOTE
Hypertension is improving with treatment.  Continue current treatment regimen.  Dietary sodium restriction.  Weight loss.  Regular aerobic exercise.  Stop smoking.  Ambulatory blood pressure monitoring.  Blood pressure will be reassessed at the next regular appointment.

## 2023-10-16 DIAGNOSIS — F40.01 PANIC DISORDER WITH AGORAPHOBIA, MILD AGORAPHOBIC AVOIDANCE AND MODERATE PANIC ATTACKS: ICD-10-CM

## 2023-10-16 NOTE — TELEPHONE ENCOUNTER
Rx Refill Note  Requested Prescriptions     Pending Prescriptions Disp Refills    diazePAM (VALIUM) 2 MG tablet [Pharmacy Med Name: diazePAM 2 MG Oral Tablet] 60 tablet 0     Sig: TAKE 1 & 1/2 (ONE & ONE-HALF) TABLETS BY MOUTH EVERY 12 HOURS AS NEEDED FOR ANXIETY      Last office visit with prescribing clinician: 8/7/2023   Last telemedicine visit with prescribing clinician: Visit date not found   Next office visit with prescribing clinician: 11/7/2023                         Would you like a call back once the refill request has been completed: [] Yes [] No    If the office needs to give you a call back, can they leave a voicemail: [] Yes [] No    Dulce Maria Rodriguez MA  10/16/23, 17:22 EDT

## 2023-10-18 RX ORDER — DIAZEPAM 2 MG/1
TABLET ORAL
Qty: 60 TABLET | Refills: 0 | Status: SHIPPED | OUTPATIENT
Start: 2023-10-18

## 2023-11-03 ENCOUNTER — OFFICE VISIT (OUTPATIENT)
Dept: NEUROSURGERY | Facility: CLINIC | Age: 43
End: 2023-11-03
Payer: COMMERCIAL

## 2023-11-03 VITALS
HEIGHT: 66 IN | DIASTOLIC BLOOD PRESSURE: 72 MMHG | TEMPERATURE: 98.2 F | SYSTOLIC BLOOD PRESSURE: 120 MMHG | WEIGHT: 185.2 LBS | BODY MASS INDEX: 29.77 KG/M2

## 2023-11-03 DIAGNOSIS — M53.3 SACRAL BACK PAIN: ICD-10-CM

## 2023-11-03 DIAGNOSIS — M54.9 MECHANICAL BACK PAIN: Primary | ICD-10-CM

## 2023-11-03 PROCEDURE — 3078F DIAST BP <80 MM HG: CPT | Performed by: PHYSICIAN ASSISTANT

## 2023-11-03 PROCEDURE — 1159F MED LIST DOCD IN RCRD: CPT | Performed by: PHYSICIAN ASSISTANT

## 2023-11-03 PROCEDURE — 1160F RVW MEDS BY RX/DR IN RCRD: CPT | Performed by: PHYSICIAN ASSISTANT

## 2023-11-03 PROCEDURE — 99203 OFFICE O/P NEW LOW 30 MIN: CPT | Performed by: PHYSICIAN ASSISTANT

## 2023-11-03 PROCEDURE — 3074F SYST BP LT 130 MM HG: CPT | Performed by: PHYSICIAN ASSISTANT

## 2023-11-03 RX ORDER — DICLOFENAC SODIUM 75 MG/1
75 TABLET, DELAYED RELEASE ORAL 2 TIMES DAILY
Qty: 60 TABLET | Refills: 1 | Status: SHIPPED | OUTPATIENT
Start: 2023-11-03

## 2023-11-03 NOTE — PROGRESS NOTES
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  He denies significant back difficulties.  About 2 months ago he developed some low back/sacral pain.  No known precipitating event.  He did recently switch positions but he was having this pain prior to switching positions.  For many years he has experienced random numbness and tingling into the legs.  This is not associated with any position or activity.  It has not been progressive.  Sacral pain is present at all times but is worse with prolonged sitting and with bending.  Sometimes walking is more aggravating.  He has been treated with ibuprofen. No bowel or bladder difficulties. No weakness. He had an xray of the lumbar spine that demonstrated mild degenerative changes.  He smokes tobacco products; about 1 pack every 2-3 days.       Past Medical History:   Diagnosis Date    Abnormal finding of lung     Emphysema, unspecified     DAKOTA (generalized anxiety disorder)     Hyperlipidemia     Hypertension     MDD (major depressive disorder)     Migraine     Obesity     Tobacco abuse          Current Outpatient Medications:     albuterol sulfate  (90 Base) MCG/ACT inhaler, Inhale 2 puffs 4 (Four) Times a Day., Disp: 18 g, Rfl: 1    clotrimazole-betamethasone (Lotrisone) 1-0.05 % cream, Apply 1 application topically to the appropriate area as directed 2 (Two) Times a Day., Disp: 45 g, Rfl: 1    diazePAM (VALIUM) 2 MG tablet, TAKE 1 & 1/2 (ONE & ONE-HALF) TABLETS BY MOUTH EVERY 12 HOURS AS NEEDED FOR ANXIETY, Disp: 60 tablet, Rfl: 0    Erenumab-aooe (Aimovig) 140 MG/ML auto-injector, Inject 1 mL under the skin into the appropriate area as directed Every 30 (Thirty) Days., Disp: 1 mL, Rfl: 5    ibuprofen (ADVIL,MOTRIN) 600 MG tablet, Take 1 tablet by mouth Every 6 (Six) Hours As Needed for  Headache., Disp: 30 tablet, Rfl: 2    lisinopril (PRINIVIL,ZESTRIL) 10 MG tablet, TAKE 1 TABLET BY MOUTH DAILY, Disp: 90 tablet, Rfl: 2    nitroglycerin (NITROSTAT) 0.4 MG SL tablet, Place 1 tablet under the tongue Every 5 (Five) Minutes As Needed for Chest Pain. Take no more than 3 doses in 15 minutes., Disp: 25 tablet, Rfl: 2    pantoprazole (PROTONIX) 40 MG EC tablet, Take 1 tablet by mouth Daily., Disp: 90 tablet, Rfl: 2    rosuvastatin (CRESTOR) 10 MG tablet, Take 1 tablet by mouth Daily., Disp: 30 tablet, Rfl: 11    tadalafil (CIALIS) 5 MG tablet, Take 1 tablet by mouth Every Other Day As Needed for Erectile Dysfunction., Disp: 90 tablet, Rfl: 3    umeclidinium-vilanterol (ANORO ELLIPTA) 62.5-25 MCG/INH aerosol powder  inhaler, Inhale 1 puff Daily., Disp: , Rfl:     venlafaxine XR (EFFEXOR-XR) 37.5 MG 24 hr capsule, Take 1 capsule by mouth Daily., Disp: 90 capsule, Rfl: 1    vitamin C (ASCORBIC ACID) 250 MG tablet, Take 1 tablet by mouth Daily., Disp: , Rfl:     vitamin D3 125 MCG (5000 UT) capsule capsule, Take 1 capsule by mouth Daily., Disp: , Rfl:     diclofenac (VOLTAREN) 75 MG EC tablet, Take 1 tablet by mouth 2 (Two) Times a Day., Disp: 60 tablet, Rfl: 1    Diclofenac Sodium (Voltaren) 1 % gel gel, Apply 4 g topically to the appropriate area as directed 4 (Four) Times a Day., Disp: 150 g, Rfl: 1    Past Surgical History:   Procedure Laterality Date    APPENDECTOMY      CHOLECYSTECTOMY         Social History     Socioeconomic History    Marital status:    Tobacco Use    Smoking status: Every Day     Packs/day: 1.00     Years: 30.00     Additional pack years: 0.00     Total pack years: 30.00     Types: Cigarettes     Start date: 1/4/1984     Passive exposure: Current    Smokeless tobacco: Never   Vaping Use    Vaping Use: Never used   Substance and Sexual Activity    Alcohol use: Never    Drug use: Never    Sexual activity: Yes         Review of Systems   Constitutional:  Negative for activity  change, appetite change, chills, diaphoresis, fatigue, fever and unexpected weight change.   HENT:  Negative for congestion, dental problem, drooling, ear discharge, ear pain, facial swelling, hearing loss, mouth sores, nosebleeds, postnasal drip, rhinorrhea, sinus pressure, sinus pain, sneezing, sore throat, tinnitus, trouble swallowing and voice change.    Eyes:  Negative for photophobia, pain, discharge, redness, itching and visual disturbance.   Respiratory:  Negative for apnea, cough, choking, chest tightness, shortness of breath, wheezing and stridor.    Cardiovascular:  Negative for chest pain, palpitations and leg swelling.   Gastrointestinal:  Negative for abdominal distention, abdominal pain, anal bleeding, blood in stool, constipation, diarrhea, nausea, rectal pain and vomiting.   Endocrine: Negative for cold intolerance, heat intolerance, polydipsia, polyphagia and polyuria.   Genitourinary:  Negative for decreased urine volume, difficulty urinating, dysuria, enuresis, flank pain, frequency, genital sores, hematuria, penile discharge, penile pain, penile swelling, scrotal swelling, testicular pain and urgency.   Musculoskeletal:  Positive for back pain. Negative for arthralgias, gait problem, joint swelling, myalgias, neck pain and neck stiffness.   Skin:  Negative for color change, pallor, rash and wound.   Allergic/Immunologic: Negative for environmental allergies, food allergies and immunocompromised state.   Neurological:  Positive for numbness. Negative for dizziness, tremors, seizures, syncope, facial asymmetry, speech difficulty, weakness, light-headedness and headaches.   Hematological:  Negative for adenopathy. Does not bruise/bleed easily.   Psychiatric/Behavioral:  Negative for agitation, behavioral problems, confusion, decreased concentration, dysphoric mood, hallucinations, self-injury, sleep disturbance and suicidal ideas. The patient is not nervous/anxious and is not hyperactive.   "      Objective   Vital Signs: Blood pressure 120/72, temperature 98.2 °F (36.8 °C), temperature source Infrared, height 167.6 cm (66\"), weight 84 kg (185 lb 3.2 oz).  Physical Exam  Vitals and nursing note reviewed.   Constitutional:       General: He is not in acute distress.     Appearance: He is well-developed.   HENT:      Head: Normocephalic and atraumatic.   Pulmonary:      Breath sounds: Normal breath sounds.   Psychiatric:         Behavior: Behavior normal.         Thought Content: Thought content normal.     Musculoskeletal:     Strength is intact in upper and lower extremities to direct testing.     Station and gait are normal.     Sacral pain is reproduced with straight leg raise on the right  Neurologic:     Muscle tone is normal throughout.     Coordination is intact.     Deep tendon reflexes: 2+ and symmetrical.     Sensation is intact to light touch throughout.     Patient is oriented to person, place, and time.       Independent review of radiographic imaging: no images to review.  Report from lumbar xray read mild lumbar scoliosis, convex to the left.     Assessment & Plan   Diagnosis:  Sacral pain  Mild lumbar levoscoliosis  Mechanical back pain    Medical Decision Making: I prescribed diclofenac and referred patient for formal therapy. He will stop taking ibuprofen. If symptoms do not improve, we will get an MRI. Follow up with me in 6-8 weeks.         Diagnoses and all orders for this visit:    1. Mechanical back pain (Primary)  -     Ambulatory Referral to Physical Therapy    2. Sacral back pain  -     Ambulatory Referral to Physical Therapy    Other orders  -     diclofenac (VOLTAREN) 75 MG EC tablet; Take 1 tablet by mouth 2 (Two) Times a Day.  Dispense: 60 tablet; Refill: 1  -     Diclofenac Sodium (Voltaren) 1 % gel gel; Apply 4 g topically to the appropriate area as directed 4 (Four) Times a Day.  Dispense: 150 g; Refill: 1                                  Stefania Ozuna, " ELENI  Patient Care Team:  Kentrell Saravia DO as PCP - General (Family Medicine)

## 2023-11-07 ENCOUNTER — OFFICE VISIT (OUTPATIENT)
Dept: FAMILY MEDICINE CLINIC | Facility: CLINIC | Age: 43
End: 2023-11-07
Payer: COMMERCIAL

## 2023-11-07 VITALS
OXYGEN SATURATION: 98 % | RESPIRATION RATE: 16 BRPM | HEART RATE: 64 BPM | BODY MASS INDEX: 29.25 KG/M2 | DIASTOLIC BLOOD PRESSURE: 86 MMHG | WEIGHT: 182 LBS | SYSTOLIC BLOOD PRESSURE: 116 MMHG | HEIGHT: 66 IN | TEMPERATURE: 98 F

## 2023-11-07 DIAGNOSIS — M53.3 COCCYDYNIA: ICD-10-CM

## 2023-11-07 DIAGNOSIS — I10 ESSENTIAL HYPERTENSION: Primary | ICD-10-CM

## 2023-11-07 DIAGNOSIS — K21.9 GERD WITHOUT ESOPHAGITIS: ICD-10-CM

## 2023-11-07 DIAGNOSIS — J32.0 CHRONIC MAXILLARY SINUSITIS: ICD-10-CM

## 2023-11-07 DIAGNOSIS — M43.6 SPASTIC TORTICOLLIS: ICD-10-CM

## 2023-11-07 DIAGNOSIS — F40.01 PANIC DISORDER WITH AGORAPHOBIA, MILD AGORAPHOBIC AVOIDANCE AND MODERATE PANIC ATTACKS: ICD-10-CM

## 2023-11-07 DIAGNOSIS — G43.109 MIGRAINE WITH AURA AND WITHOUT STATUS MIGRAINOSUS, NOT INTRACTABLE: ICD-10-CM

## 2023-11-07 PROCEDURE — 99214 OFFICE O/P EST MOD 30 MIN: CPT | Performed by: FAMILY MEDICINE

## 2023-11-07 PROCEDURE — 3079F DIAST BP 80-89 MM HG: CPT | Performed by: FAMILY MEDICINE

## 2023-11-07 PROCEDURE — 3074F SYST BP LT 130 MM HG: CPT | Performed by: FAMILY MEDICINE

## 2023-11-07 RX ORDER — SULINDAC 200 MG/1
200 TABLET ORAL 2 TIMES DAILY WITH MEALS
Qty: 60 TABLET | Refills: 1 | Status: SHIPPED | OUTPATIENT
Start: 2023-11-07

## 2023-11-07 RX ORDER — DEXAMETHASONE 0.5 MG/1
TABLET ORAL
Qty: 21 TABLET | Refills: 0 | Status: SHIPPED | OUTPATIENT
Start: 2023-11-07

## 2023-11-07 RX ORDER — AZITHROMYCIN 250 MG/1
TABLET, FILM COATED ORAL
Qty: 6 TABLET | Refills: 0 | Status: SHIPPED | OUTPATIENT
Start: 2023-11-07

## 2023-11-07 NOTE — PROGRESS NOTES
Established Patient        Chief Complaint:   Chief Complaint   Patient presents with    Follow-up    Med Refill        Ronnie Reveles is a 43 y.o. male    History of Present Illness:   Here today in scheduled follow-up visit of his hypertension, GERD, panic disorder, spastic torticollis, migraine headache disorder and chronic maxillary sinusitis.    Patient reports his coccydynia has improved considerably with some occupational changes, or formal padding.    He denies any saddle anesthesia.  No seizure-like activity.    Denies chest pain, syncope, palpitations or vertigo.  Denies fever, chills or night sweats.  Maintains an active lifestyle, balanced dietary intake; reports good hydration habits.  Denies hematuria/dysuria.  Denies any BRB/BTS.  No new rashes.  Denies orthopnea, epistaxis or hemoptysis.    Subjective     The following portions of the patient's history were reviewed and updated as appropriate: allergies, current medications, past family history, past medical history, past social history, past surgical history and problem list.    Allergies   Allergen Reactions    Fenofibrate Rash    Atorvastatin Myalgia    Sulfa Antibiotics Hives    Tramadol Hives    Zoloft [Sertraline] Irritability       Review of Systems  Constitutional: Negative for fever. Negative for chills, diaphoresis, fatigue and unexpected weight change.   HENT: No dysphagia; no changes to vision/hearing/smell/taste; no epistaxis  Eyes: Negative for redness and visual disturbance.   Respiratory: negative for shortness of breath. Negative for chest pain . Negative for cough and chest tightness.   Cardiovascular: Negative for chest pain and palpitations.   Gastrointestinal: As per above.  Endocrine: Negative for cold intolerance and heat intolerance.   Genitourinary: Negative for difficulty urinating, dysuria and frequency.   Musculoskeletal: Coccydynia as per above, spastic torticollis.  Skin: Negative for color  "change, rash and wound.   Neurological: Negative for syncope, weakness; chronic migraine headache disorder.  Hematological: Negative for adenopathy. Does not bruise/bleed easily.   Psychiatric/Behavioral: Negative for confusion. The patient is not nervous/anxious.    Objective     Physical Exam   Vital Signs: /86   Pulse 64   Temp 98 °F (36.7 °C)   Resp 16   Ht 167.6 cm (66\")   Wt 82.6 kg (182 lb)   SpO2 98%   BMI 29.38 kg/m²   BMI is >= 30 and <35. (Class 1 Obesity). The following options were offered after discussion;: exercise counseling/recommendations and nutrition counseling/recommendations  Patient's (Body mass index is 29.38 kg/m².) indicates that they are obese (BMI >30) with health related conditions that include hypertension, dyslipidemias, and GERD . Weight is improving with lifestyle modifications. BMI is is above average; BMI management plan is completed. We discussed low calorie, low carb based diet program, portion control, increasing exercise, joining a fitness center or start home based exercise program, and Weight Watchers or other Commercial based weight reduction program.      General Appearance: alert, oriented x 3, no acute distress.  Skin: warm and dry.   HEENT: Atraumatic.  pupils round and reactive to light and accommodation, oral mucosa pink and moist.  Nares patent without epistaxis.  External auditory canals are patent tympanic membranes intact.  Neck: supple, no JVD, trachea midline.  No thyromegaly  Lungs: CTA, unlabored breathing effort.  Heart: RRR, normal S1 and S2, no S3, no rub.  Abdomen: soft, non-tender, no palpable bladder, present bowel sounds to auscultation ×4.  No guarding or rigidity.  No CVA tenderness.  Extremities: no clubbing, cyanosis or edema.  Good range of motion actively and passively.  Symmetric muscle strength and development  Neuro: normal speech and mental status.  Cranial nerves II through XII intact.  No anosmia. DTR 2+; proprioception intact.  " No focal motor/sensory deficits.    Advance Care Planning   ACP discussion was held with the patient during this visit. Patient does not have an advance directive, information provided.       Assessment and Plan      Assessment/Plan:   Diagnoses and all orders for this visit:    1. Essential hypertension (Primary)    2. GERD without esophagitis    3. Panic disorder with agoraphobia, mild agoraphobic avoidance and moderate panic attacks    4. Spastic torticollis    5. Migraine with aura and without status migrainosus, not intractable    6. Coccydynia  -     sulindac (CLINORIL) 200 MG tablet; Take 1 tablet by mouth 2 (Two) Times a Day With Meals.  Dispense: 60 tablet; Refill: 1      Suspect occupational demands have contributed to his coccydynia; will change to sulindac only, to be taken with meals.    Weight loss with effort, pleased with rate thus far.    VSS, appears HD asymptomatic.    Continue avoidance of known migraine headache triggers.    Discussed need for stress/anxiety reducing techniques such as prayer/meditation/breathing and counting exercises and avoidance of stress producing environments/situations; will follow clinically.  Occupational change has helped his anxiety additionally.      Discussion Summary:    Discussed plan of care in detail with pt today; pt verb understanding and agrees.    I spent 30 minutes caring for Ronnie on this date of service. This time includes time spent by me in the following activities:preparing for the visit, performing a medically appropriate examination and/or evaluation , counseling and educating the patient/family/caregiver, ordering medications, tests, or procedures, documenting information in the medical record, and care coordination    I have reviewed and updated all copied forward information, as appropriate.  I attest to the accuracy and relevance of any unchanged information.    Follow up:  Return in about 3 months (around 2/7/2024) for Recheck, Med Change/New  Meds.     There are no Patient Instructions on file for this visit.    Kentrell Saravia,   11/07/23  15:58 EST

## 2023-11-29 ENCOUNTER — PRIOR AUTHORIZATION (OUTPATIENT)
Dept: FAMILY MEDICINE CLINIC | Facility: CLINIC | Age: 43
End: 2023-11-29
Payer: COMMERCIAL

## 2023-11-29 NOTE — TELEPHONE ENCOUNTER
Key: [id number]      The request has been approved. The authorization is effective from 11/29/2023 to 11/28/2024, as long as the member is enrolled in their current health plan      Patient notified.    Sreedhar Bella CMA PA started for Aimovig.  Waiting for insurance reply    WILBERT Bella

## 2023-12-17 DIAGNOSIS — F40.01 PANIC DISORDER WITH AGORAPHOBIA, MILD AGORAPHOBIC AVOIDANCE AND MODERATE PANIC ATTACKS: ICD-10-CM

## 2023-12-17 RX ORDER — DIAZEPAM 2 MG/1
TABLET ORAL
Qty: 60 TABLET | Refills: 0 | Status: CANCELLED | OUTPATIENT
Start: 2023-12-17

## 2023-12-18 NOTE — TELEPHONE ENCOUNTER
Rx Refill Note  Requested Prescriptions     Pending Prescriptions Disp Refills    diazePAM (VALIUM) 2 MG tablet 60 tablet 0      Last office visit with prescribing clinician: 11/7/2023   Last telemedicine visit with prescribing clinician: Visit date not found   Next office visit with prescribing clinician: 2/12/2024                         Would you like a call back once the refill request has been completed: [] Yes [] No    If the office needs to give you a call back, can they leave a voicemail: [] Yes [] No    Dulce Maria Rodriguez MA  12/18/23, 12:01 EST

## 2023-12-28 DIAGNOSIS — F40.01 PANIC DISORDER WITH AGORAPHOBIA, MILD AGORAPHOBIC AVOIDANCE AND MODERATE PANIC ATTACKS: ICD-10-CM

## 2023-12-28 RX ORDER — DIAZEPAM 2 MG/1
3 TABLET ORAL EVERY 12 HOURS PRN
Qty: 60 TABLET | Refills: 2 | Status: SHIPPED | OUTPATIENT
Start: 2023-12-28

## 2024-01-30 DIAGNOSIS — G43.109 MIGRAINE WITH AURA AND WITHOUT STATUS MIGRAINOSUS, NOT INTRACTABLE: ICD-10-CM

## 2024-01-31 DIAGNOSIS — G43.109 MIGRAINE WITH AURA AND WITHOUT STATUS MIGRAINOSUS, NOT INTRACTABLE: ICD-10-CM

## 2024-01-31 DIAGNOSIS — F40.01 PANIC DISORDER WITH AGORAPHOBIA, MILD AGORAPHOBIC AVOIDANCE AND MODERATE PANIC ATTACKS: ICD-10-CM

## 2024-02-02 RX ORDER — VENLAFAXINE HYDROCHLORIDE 37.5 MG/1
37.5 CAPSULE, EXTENDED RELEASE ORAL DAILY
Qty: 90 CAPSULE | Refills: 0 | Status: SHIPPED | OUTPATIENT
Start: 2024-02-02

## 2024-02-02 RX ORDER — ERENUMAB-AOOE 140 MG/ML
INJECTION, SOLUTION SUBCUTANEOUS
Qty: 1 ML | Refills: 2 | Status: SHIPPED | OUTPATIENT
Start: 2024-02-02

## 2024-02-12 ENCOUNTER — OFFICE VISIT (OUTPATIENT)
Dept: FAMILY MEDICINE CLINIC | Facility: CLINIC | Age: 44
End: 2024-02-12
Payer: COMMERCIAL

## 2024-02-12 DIAGNOSIS — M25.60 MORNING STIFFNESS OF JOINTS: ICD-10-CM

## 2024-02-12 DIAGNOSIS — M25.50 POLYARTHRALGIA: ICD-10-CM

## 2024-02-12 DIAGNOSIS — M35.3 POLYMYALGIA: ICD-10-CM

## 2024-02-12 DIAGNOSIS — E16.2 HYPOGLYCEMIA: Primary | ICD-10-CM

## 2024-02-12 NOTE — PROGRESS NOTES
Established Patient        Chief Complaint:   Chief Complaint   Patient presents with    Follow-up     3mo follow up   Pt wants to talk about medication.        Ronnie Reveles is a 43 y.o. male    History of Present Illness:   Here today for evaluation of continued difficulties with polyarthralgia, polymyalgia and morning stiffness of joints.  He reports several episodes of symptomatic hypoglycemia additionally.    Sulindac has not been effective.  Continues to have sacral/coccygeal pain, and particularly worsened pain to raudel hands.  Stiffness of raudel hands >3-4 hrs, and does not resolve, but rather decreases in severity only.    Denies chest pain, syncope, palpitations or vertigo.  Denies fever, chills or night sweats.  Maintains an active lifestyle, balanced dietary intake; reports good hydration habits.  Denies hematuria/dysuria.  Denies any BRB/BTS.  No new rashes.  Denies orthopnea, epistaxis or hemoptysis.      Subjective     The following portions of the patient's history were reviewed and updated as appropriate: allergies, current medications, past family history, past medical history, past social history, past surgical history and problem list.    Allergies   Allergen Reactions    Fenofibrate Rash    Atorvastatin Myalgia    Sulfa Antibiotics Hives    Tramadol Hives    Zoloft [Sertraline] Irritability       Review of Systems  Constitutional: Negative for fever. Negative for chills, diaphoresis, fatigue and unexpected weight change.   HENT: No dysphagia; no changes to vision/hearing/smell/taste; no epistaxis  Eyes: Negative for redness and visual disturbance.   Respiratory: negative for shortness of breath. Negative for chest pain . Negative for cough and chest tightness.   Cardiovascular: Negative for chest pain and palpitations.   Gastrointestinal: As per above.  Endocrine: Negative for cold intolerance and heat intolerance.   Genitourinary: Negative for difficulty urinating, dysuria  "and frequency.   Musculoskeletal: Coccydynia as per above, spastic torticollis.  Diffuse arthralgias and myalgias, morning stiffness as per above.  Skin: Negative for color change, rash and wound.   Neurological: Negative for syncope, weakness; chronic migraine headache disorder.  Hematological: Negative for adenopathy. Does not bruise/bleed easily.   Psychiatric/Behavioral: Negative for confusion. The patient is not nervous/anxious.    Objective     Physical Exam   Vital Signs: /82   Pulse 87   Ht 167.6 cm (66\")   Wt 85.1 kg (187 lb 9.6 oz)   SpO2 96%   BMI 30.28 kg/m²   BMI is >= 30 and <35. (Class 1 Obesity). The following options were offered after discussion;: exercise counseling/recommendations and nutrition counseling/recommendations  Patient's (Body mass index is 30.28 kg/m².) indicates that they are obese (BMI >30) with health related conditions that include hypertension, dyslipidemias, and GERD . Weight is improving with lifestyle modifications. BMI is is above average; BMI management plan is completed. We discussed low calorie, low carb based diet program, portion control, increasing exercise, joining a fitness center or start home based exercise program, and Weight Watchers or other Commercial based weight reduction program.      General Appearance: alert, oriented x 3, no acute distress.  Skin: warm and dry.   HEENT: Atraumatic.  pupils round and reactive to light and accommodation, oral mucosa pink and moist.  Nares patent without epistaxis.  External auditory canals are patent tympanic membranes intact.  Neck: supple, no JVD, trachea midline.  No thyromegaly.  There is spasticity noted to the paravertebral musculature of the cervical spine, limiting all aspects of ROM testing.  Lungs: CTA, unlabored breathing effort.  Heart: RRR, normal S1 and S2, no S3, no rub.  Abdomen: soft, non-tender, no palpable bladder, present bowel sounds to auscultation ×4.  No guarding or rigidity.  No CVA " tenderness.  Extremities: no clubbing, cyanosis or edema.  Good range of motion actively and passively.  Symmetric muscle strength and development  Neuro: normal speech and mental status.  Cranial nerves II through XII intact.  No anosmia. DTR 2+; proprioception intact.  No focal motor/sensory deficits.    Advance Care Planning   ACP discussion was held with the patient during this visit. Patient does not have an advance directive, information provided.       Assessment and Plan      Assessment/Plan:   Diagnoses and all orders for this visit:    1. Hypoglycemia (Primary)  -     C-Peptide    2. Polyarthralgia  -     Uric acid  -     Antistreptolysin O screen  -     Rheumatoid Factor  -     C-reactive protein  -     DARLING    3. Polymyalgia  -     Uric acid  -     Antistreptolysin O screen  -     Rheumatoid Factor  -     C-reactive protein  -     DARLING    4. Morning stiffness of joints  -     Uric acid  -     Antistreptolysin O screen  -     Rheumatoid Factor  -     C-reactive protein  -     DARLING    Other orders  -     C-Peptide      Rheumatoid evaluation completed today.    I have stressed the importance of avoiding prolonged fasting periods.  C-peptide to be evaluated due to symptomatic hypoglycemia.  Maintain appropriate hydration status.    Vital signs demonstrate hemodynamic stability.  Blood pressure is at goal.    Referral to rheumatology after review of labs.    Patient does have a mild true fasting state, C-peptide level will be for screening purposes only.  Should it be elevated, repeat confirmatory C-peptide will be checked when patient is in a true fasting state.    Discussion Summary:    Discussed plan of care in detail with pt today; pt verb understanding and agrees.    I spent 30 minutes caring for Ronnie on this date of service. This time includes time spent by me in the following activities:preparing for the visit, performing a medically appropriate examination and/or evaluation , counseling and educating  the patient/family/caregiver, ordering medications, tests, or procedures, documenting information in the medical record, and care coordination    I have reviewed and updated all copied forward information, as appropriate.  I attest to the accuracy and relevance of any unchanged information.    Follow up:  No follow-ups on file.     There are no Patient Instructions on file for this visit.    Kentrell Saravia DO  02/26/24  21:02 EST

## 2024-02-13 LAB
ANA SER QL: NEGATIVE
C PEPTIDE SERPL-MCNC: 6.9 NG/ML (ref 1.1–4.4)
CRP SERPL-MCNC: 2 MG/L (ref 0–10)
RHEUMATOID FACT SERPL-ACNC: 10.2 IU/ML
URATE SERPL-MCNC: 5.8 MG/DL (ref 3.8–8.4)

## 2024-02-26 VITALS
SYSTOLIC BLOOD PRESSURE: 130 MMHG | WEIGHT: 187.6 LBS | HEIGHT: 66 IN | HEART RATE: 87 BPM | OXYGEN SATURATION: 96 % | DIASTOLIC BLOOD PRESSURE: 82 MMHG | BODY MASS INDEX: 30.15 KG/M2

## 2024-02-28 ENCOUNTER — PRIOR AUTHORIZATION (OUTPATIENT)
Dept: FAMILY MEDICINE CLINIC | Facility: CLINIC | Age: 44
End: 2024-02-28
Payer: COMMERCIAL

## 2024-02-28 ENCOUNTER — TELEPHONE (OUTPATIENT)
Dept: FAMILY MEDICINE CLINIC | Facility: CLINIC | Age: 44
End: 2024-02-28
Payer: COMMERCIAL

## 2024-02-28 NOTE — TELEPHONE ENCOUNTER
Caller: CHAYITO CAMARENA    Relationship: Emergency Contact    Best call back number: [phone]     Which medication are you concerned about: Erenumab-aooe (Aimovig) 140 MG/ML auto-injector     Who prescribed you this medication: Kentrell Saravia, DO     When did you start taking this medication: NA    What are your concerns: PATIENT NEEDS A PRIOR AUTHORIZATION IN ORDER FOR THE INSURANCE TO COVER.     THIS INJECTION IS GIVEN ONCE A MONTH AND THE PATIENT IS OVER DUE.

## 2024-02-28 NOTE — TELEPHONE ENCOUNTER
(Key: [id number])      Prior Authorization states that member has an active PA on file which is expiring on 11/28/2024.    Jena Eli CMA    PA sent for Aimovig to insurance.  Waiting fo response.    Jena Eli CMA

## 2024-03-07 NOTE — TELEPHONE ENCOUNTER
PATIENTS WIFE SAID THEY HAD BROUGHT HIS NEW INSUR CARD IN, [employer]. PHARMACY IS SAYING WE NEED TO GET AN AUTH FROM [facility] NOW. PLEASE GIVE THEM A CALL BACK ASAP.

## 2024-03-21 ENCOUNTER — PRIOR AUTHORIZATION (OUTPATIENT)
Dept: FAMILY MEDICINE CLINIC | Facility: CLINIC | Age: 44
End: 2024-03-21
Payer: COMMERCIAL

## 2024-03-21 NOTE — TELEPHONE ENCOUNTER
A prior auth has been started through cover my meds for emgality.    Waiting on a response from the insurance.    Key: [id number]

## 2024-03-21 NOTE — TELEPHONE ENCOUNTER
Prior auth has been resubmitted through cover my meds for aimovig.    Waiting on a response from the insurance.    I have reached out to the patient via [employer] regarding this matter.

## 2024-03-22 NOTE — TELEPHONE ENCOUNTER
PATIENT'S MEDICATION HAS BEEN DENIED BY THE INSURANCE.      PER THE INSURANCE:   Coverage for this medication is denied for the following reason(s). We reviewed the information we received about your condition and circumstances. We used plan approved criteria when making this decision. The policy states that this medication may be covered when the requested product cannot be switched to a formulary alternative. Based on the policy and the information we received your request  was denied. The request was denied because the requested medication can be switched to the formulary alternative.  Formulary alternatives are: Ajovy, Emgality, Qulipta. Requirement: 3 in a class with 3 or more  alternatives, 2 in a class with 2 alternatives, or 1 in a class 1 alternative. Note: Formulary alternatives  may require a prior authorization. Your prescriber will be responsible for determining what alternative is  appropriate for you.

## 2024-03-25 ENCOUNTER — OFFICE VISIT (OUTPATIENT)
Dept: FAMILY MEDICINE CLINIC | Facility: CLINIC | Age: 44
End: 2024-03-25
Payer: COMMERCIAL

## 2024-03-25 VITALS
DIASTOLIC BLOOD PRESSURE: 82 MMHG | OXYGEN SATURATION: 98 % | SYSTOLIC BLOOD PRESSURE: 124 MMHG | HEIGHT: 66 IN | HEART RATE: 82 BPM | BODY MASS INDEX: 30.15 KG/M2 | WEIGHT: 187.6 LBS

## 2024-03-25 DIAGNOSIS — E16.2 HYPOGLYCEMIA: Primary | ICD-10-CM

## 2024-03-25 DIAGNOSIS — B35.4 TINEA CORPORIS: ICD-10-CM

## 2024-03-25 DIAGNOSIS — M25.50 POLYARTHRALGIA: ICD-10-CM

## 2024-03-25 DIAGNOSIS — I10 ESSENTIAL HYPERTENSION: ICD-10-CM

## 2024-03-25 DIAGNOSIS — G43.109 MIGRAINE WITH AURA AND WITHOUT STATUS MIGRAINOSUS, NOT INTRACTABLE: ICD-10-CM

## 2024-03-25 DIAGNOSIS — R94.7 ELEVATED C PEPTIDE LEVEL: ICD-10-CM

## 2024-03-25 RX ORDER — MELOXICAM 15 MG/1
15 TABLET ORAL DAILY
Qty: 90 TABLET | Refills: 2 | Status: SHIPPED | OUTPATIENT
Start: 2024-03-25

## 2024-03-25 RX ORDER — CLOTRIMAZOLE AND BETAMETHASONE DIPROPIONATE 10; .64 MG/G; MG/G
1 CREAM TOPICAL 2 TIMES DAILY
Qty: 45 G | Refills: 1 | Status: SHIPPED | OUTPATIENT
Start: 2024-03-25

## 2024-03-25 NOTE — PROGRESS NOTES
Established Patient        Chief Complaint:   Chief Complaint   Patient presents with    Follow-up     2wk follow up for hypoglycemia.        Ronnie Reveles is a 44 y.o. male    History of Present Illness:   Here today in scheduled follow-up visit of his hypoglycemic episodes, hypertension, migraine headache disorder and polyarthralgia.    Still having episodes of hypoglycemia with any fasting episodes.  Symptoms are relieved with food intake.  Last laboratory evaluation demonstrated an elevated C-peptide level.    Denies chest pain, syncope, palpitations or vertigo.  Denies fever, chills or night sweats.  Maintains an active lifestyle, balanced dietary intake; reports good hydration habits.  Denies hematuria/dysuria.  Denies any BRB/BTS.  Denies orthopnea, epistaxis or hemoptysis.    History of chronic arthralgias, involving both upper and lower extremities, particularly bothersome to her cervical spine.    Patient also reports circular pruritic areas to his upper extremities.  No contact irritants.    Subjective     The following portions of the patient's history were reviewed and updated as appropriate: allergies, current medications, past family history, past medical history, past social history, past surgical history and problem list.    Allergies   Allergen Reactions    Fenofibrate Rash    Atorvastatin Myalgia    Sulfa Antibiotics Hives    Tramadol Hives    Zoloft [Sertraline] Irritability       Review of Systems  Constitutional: Negative for fever. Negative for chills, diaphoresis, fatigue and unexpected weight change.   HENT: No dysphagia; no changes to vision/hearing/smell/taste; no epistaxis  Eyes: Negative for redness and visual disturbance.   Respiratory: negative for shortness of breath. Negative for chest pain . Negative for cough and chest tightness.   Cardiovascular: Negative for chest pain and palpitations.   Gastrointestinal: As per above.  Endocrine: Negative for cold  "intolerance and heat intolerance.   Genitourinary: Negative for difficulty urinating, dysuria and frequency.   Musculoskeletal: Coccydynia as per above, spastic torticollis.  Diffuse arthralgias and myalgias, morning stiffness as per above.  Skin: As per above.  Neurological: Negative for syncope, weakness; chronic migraine headache disorder.  Hematological: Negative for adenopathy. Does not bruise/bleed easily.   Psychiatric/Behavioral: Negative for confusion. The patient is not nervous/anxious.    Objective     Physical Exam   Vital Signs: /82   Pulse 82   Ht 167.6 cm (66\")   Wt 85.1 kg (187 lb 9.6 oz)   SpO2 98%   BMI 30.28 kg/m²   BMI is >= 30 and <35. (Class 1 Obesity). The following options were offered after discussion;: exercise counseling/recommendations and nutrition counseling/recommendations  Patient's (Body mass index is 30.28 kg/m².) indicates that they are obese (BMI >30) with health related conditions that include hypertension, dyslipidemias, and GERD . Weight is improving with lifestyle modifications. BMI is is above average; BMI management plan is completed. We discussed low calorie, low carb based diet program, portion control, increasing exercise, joining a fitness center or start home based exercise program, and Weight Watchers or other Commercial based weight reduction program.      General Appearance: alert, oriented x 3, no acute distress.  Skin: warm and dry.  Nummular erythematous areas to the upper extremities consistent with tinea.  Signs of excoriation noted.  HEENT: Atraumatic.  pupils round and reactive to light and accommodation, oral mucosa pink and moist.  Nares patent without epistaxis.  External auditory canals are patent tympanic membranes intact.  Neck: supple, no JVD, trachea midline.  No thyromegaly.  There is spasticity noted to the paravertebral musculature of the cervical spine, limiting all aspects of ROM testing.  Lungs: CTA, unlabored breathing effort.  Heart: " RRR, normal S1 and S2, no S3, no rub.  Abdomen: soft, non-tender, no palpable bladder, present bowel sounds to auscultation ×4.  No guarding or rigidity.  No CVA tenderness.  Extremities: no clubbing, cyanosis or edema.  Good range of motion actively and passively.  Symmetric muscle strength and development  Neuro: normal speech and mental status.  Cranial nerves II through XII intact.  No anosmia. DTR 2+; proprioception intact.  No focal motor/sensory deficits.        Assessment and Plan      Assessment/Plan:   Diagnoses and all orders for this visit:    1. Hypoglycemia (Primary)  -     Ambulatory Referral to Endocrinology    2. Essential hypertension    3. Migraine with aura and without status migrainosus, not intractable    4. Tinea corporis  -     clotrimazole-betamethasone (Lotrisone) 1-0.05 % cream; Apply 1 Application topically to the appropriate area as directed 2 (Two) Times a Day.  Dispense: 45 g; Refill: 1    5. Elevated C peptide level  -     Ambulatory Referral to Endocrinology    6. Polyarthralgia  -     meloxicam (MOBIC) 15 MG tablet; Take 1 tablet by mouth Daily.  Dispense: 90 tablet; Refill: 2      Referral to endocrinology for further evaluation of his hyperglycemia and elevated C-peptide levels.    Meloxicam in treatment of polyarthralgia.  I have asked that he take the medication with food, notify the office should he develop any ill effects.    Vital signs demonstrate hemodynamic stability.  Blood pressure is at goal.    Lotrisone cream prescribed, to be applied to affected areas twice daily until resolved.  Notify the office should he develop any ill effects to the medication.    Continue avoidance of known migraine headache triggers.  Maintain good hydration habits, avoid excessive caffeine intake.    Discussion Summary:    Discussed plan of care in detail with pt today; pt verb understanding and agrees.    I spent 30 minutes caring for Ronnie on this date of service. This time includes time  spent by me in the following activities:preparing for the visit, performing a medically appropriate examination and/or evaluation , counseling and educating the patient/family/caregiver, ordering medications, tests, or procedures, referring and communicating with other health care professionals , documenting information in the medical record, and care coordination    I have reviewed and updated all copied forward information, as appropriate.  I attest to the accuracy and relevance of any unchanged information.    Follow up:  Return in about 3 months (around 6/25/2024) for Recheck, Med Change/New Meds.     There are no Patient Instructions on file for this visit.    Kentrell Saravia DO  03/25/24  16:38 EDT

## 2024-04-08 ENCOUNTER — TELEPHONE (OUTPATIENT)
Dept: FAMILY MEDICINE CLINIC | Facility: CLINIC | Age: 44
End: 2024-04-08

## 2024-04-08 ENCOUNTER — TELEPHONE (OUTPATIENT)
Dept: FAMILY MEDICINE CLINIC | Facility: CLINIC | Age: 44
End: 2024-04-08
Payer: COMMERCIAL

## 2024-04-08 NOTE — TELEPHONE ENCOUNTER
Caller: CHAYITO CAMARENA    Relationship: Emergency Contact    Best call back number: [phone]    What medication are you requesting: SOMETHING FOR A MIGRAINE     What are your current symptoms: MIGRAINE    How long have you been experiencing symptoms: THE LAST FEW DAYS SINCE LAST THURSDAY     Have you had these symptoms before:    [x] Yes  [] No    Have you been treated for these symptoms before:   [x] Yes  [] No    If a prescription is needed, what is your preferred pharmacy and phone number: [address] [phone] [facility] [phone]      Additional notes: THE CALLER WOULD LIKE TO KNOW IF A MEDICATION CAN BE CALLED IN FOR HIS MIGRAINE

## 2024-04-09 ENCOUNTER — OFFICE VISIT (OUTPATIENT)
Dept: FAMILY MEDICINE CLINIC | Facility: CLINIC | Age: 44
End: 2024-04-09
Payer: COMMERCIAL

## 2024-04-09 VITALS
OXYGEN SATURATION: 98 % | BODY MASS INDEX: 18.29 KG/M2 | HEIGHT: 66 IN | WEIGHT: 113.8 LBS | TEMPERATURE: 97.9 F | DIASTOLIC BLOOD PRESSURE: 84 MMHG | SYSTOLIC BLOOD PRESSURE: 138 MMHG | RESPIRATION RATE: 16 BRPM | HEART RATE: 85 BPM

## 2024-04-09 DIAGNOSIS — G43.109 MIGRAINE WITH AURA AND WITHOUT STATUS MIGRAINOSUS, NOT INTRACTABLE: Primary | ICD-10-CM

## 2024-04-09 DIAGNOSIS — I10 ESSENTIAL HYPERTENSION: ICD-10-CM

## 2024-04-09 PROCEDURE — 99214 OFFICE O/P EST MOD 30 MIN: CPT | Performed by: FAMILY MEDICINE

## 2024-04-09 RX ORDER — BUTALBITAL, ACETAMINOPHEN AND CAFFEINE 50; 325; 40 MG/1; MG/1; MG/1
1 TABLET ORAL EVERY 4 HOURS PRN
Qty: 20 TABLET | Refills: 0 | Status: SHIPPED | OUTPATIENT
Start: 2024-04-09

## 2024-04-09 NOTE — PROGRESS NOTES
"    Office Note     Name: Ronnie Reveles  : 1980   MRN: [id number]     Chief Complaint:  Migraine    Subjective     History of Present Illness:  Ronnie Reveles is a 44 y.o. male who presents today for migraine. Pt has had a migraine since Friday. He was taking injections for it but insurance would no longer pay for injections. Pt stated he was called in something after he made this appt but was not sure what it was and has not started it yet. Per chart review patient was Fioricet. He had been taking ubrelvy 50mg previously but this medication did not work. He has not started Fioricet. Not currently on any prophylactic migraine medication.        I have reviewed the following portions of the patient's history and these were updated and discussed with the patient as appropriate: past family history, past medical history, past social history, past surgical history, and problem list.     Objective     Vital Signs  /84   Pulse 85   Temp 97.9 °F (36.6 °C) (Temporal)   Resp 16   Ht 167.6 cm (66\")   Wt 51.6 kg (113 lb 12.8 oz)   SpO2 98%   BMI 18.37 kg/m²   Estimated body mass index is 18.37 kg/m² as calculated from the following:    Height as of this encounter: 167.6 cm (66\").    Weight as of this encounter: 51.6 kg (113 lb 12.8 oz).    Physical Exam  Vitals reviewed.   Constitutional:       General: He is not in acute distress.     Appearance: Normal appearance. He is not ill-appearing.   HENT:      Head: Normocephalic.   Eyes:      Extraocular Movements: Extraocular movements intact.   Cardiovascular:      Rate and Rhythm: Normal rate and regular rhythm.      Heart sounds: Normal heart sounds. No murmur heard.  Pulmonary:      Effort: Pulmonary effort is normal. No respiratory distress.      Breath sounds: Normal breath sounds. No stridor. No wheezing, rhonchi or rales.   Chest:      Chest wall: No tenderness.   Neurological:      Mental Status: He is alert and oriented to person, place, and " time.   Psychiatric:         Mood and Affect: Mood normal.         Behavior: Behavior normal.            Assessment and Plan     Diagnoses and all orders for this visit:    1. Migraine with aura and without status migrainosus, not intractable (Primary)  -     Rimegepant Sulfate (Nurtec) 75 MG tablet dispersible tablet; Take 1 tablet by mouth 1 (One) Time As Needed (migraine) for up to 2 doses.  Dispense: 2 tablet; Refill: 0    2. Essential hypertension    Continue avoidance of known migraine headache triggers. Maintain good hydration habits, avoid excessive caffeine intake.   Start Fioricet as prescribed by Dr. Saravia patient's PCP  Will also give patient sample of nurtec to abort current migraine until he can  Fioricet  Maintain good blood pressure control        Discussion Summary:     Discussed plan of care in detail with patient today. Patient was encouraged to keep me informed of any acute changes, lack of improvement, or any new concerning symptoms.  Patient is also aware of reasons to seek emergent care. Patient verbalized understanding and agrees with plan of care.    This visit was billed based on MDM.    Follow Up  Return in about 1 month (around 5/9/2024).    Please note that portions of this note may have been completed with a voice recognition program.     Ellis Pike MD, MPH  [facility] [place]

## 2024-04-11 NOTE — TELEPHONE ENCOUNTER
Per Dr. Saravia:   I sent a Rx for Fioricet to his pharmacy for prn use.       Patient has been notified via [facility]al[facility].    Patient was also seen by Dr. Pike on 4/9/24

## 2024-04-19 DIAGNOSIS — K21.9 GERD WITHOUT ESOPHAGITIS: ICD-10-CM

## 2024-04-19 RX ORDER — PANTOPRAZOLE SODIUM 40 MG/1
TABLET, DELAYED RELEASE ORAL
Qty: 90 TABLET | Refills: 0 | Status: SHIPPED | OUTPATIENT
Start: 2024-04-19

## 2024-04-25 RX ORDER — RIMEGEPANT SULFATE 75 MG/75MG
75 TABLET, ORALLY DISINTEGRATING ORAL ONCE AS NEEDED
Qty: 2 TABLET | Refills: 0 | COMMUNITY
Start: 2024-04-25

## 2024-05-03 DIAGNOSIS — F40.01 PANIC DISORDER WITH AGORAPHOBIA, MILD AGORAPHOBIC AVOIDANCE AND MODERATE PANIC ATTACKS: ICD-10-CM

## 2024-05-03 DIAGNOSIS — G43.109 MIGRAINE WITH AURA AND WITHOUT STATUS MIGRAINOSUS, NOT INTRACTABLE: ICD-10-CM

## 2024-05-03 RX ORDER — VENLAFAXINE HYDROCHLORIDE 37.5 MG/1
37.5 CAPSULE, EXTENDED RELEASE ORAL DAILY
Qty: 90 CAPSULE | Refills: 0 | Status: SHIPPED | OUTPATIENT
Start: 2024-05-03

## 2024-05-09 ENCOUNTER — OFFICE VISIT (OUTPATIENT)
Dept: FAMILY MEDICINE CLINIC | Facility: CLINIC | Age: 44
End: 2024-05-09
Payer: COMMERCIAL

## 2024-05-09 VITALS
TEMPERATURE: 97.2 F | RESPIRATION RATE: 16 BRPM | DIASTOLIC BLOOD PRESSURE: 84 MMHG | WEIGHT: 189 LBS | HEIGHT: 66 IN | OXYGEN SATURATION: 98 % | HEART RATE: 98 BPM | BODY MASS INDEX: 30.37 KG/M2 | SYSTOLIC BLOOD PRESSURE: 126 MMHG

## 2024-05-09 DIAGNOSIS — K64.9 HEMORRHOIDS, UNSPECIFIED HEMORRHOID TYPE: ICD-10-CM

## 2024-05-09 DIAGNOSIS — J30.9 CHRONIC ALLERGIC RHINITIS: Primary | ICD-10-CM

## 2024-05-09 DIAGNOSIS — F40.01 PANIC DISORDER WITH AGORAPHOBIA, MILD AGORAPHOBIC AVOIDANCE AND MODERATE PANIC ATTACKS: ICD-10-CM

## 2024-05-09 DIAGNOSIS — G43.109 MIGRAINE WITH AURA AND WITHOUT STATUS MIGRAINOSUS, NOT INTRACTABLE: ICD-10-CM

## 2024-05-09 PROCEDURE — 99214 OFFICE O/P EST MOD 30 MIN: CPT | Performed by: FAMILY MEDICINE

## 2024-05-09 RX ORDER — LEVOCETIRIZINE DIHYDROCHLORIDE 5 MG/1
5 TABLET, FILM COATED ORAL EVERY EVENING
Qty: 30 TABLET | Refills: 2 | Status: SHIPPED | OUTPATIENT
Start: 2024-05-09

## 2024-05-09 RX ORDER — FLUTICASONE PROPIONATE 50 MCG
2 SPRAY, SUSPENSION (ML) NASAL DAILY
Qty: 18.2 ML | Refills: 2 | Status: SHIPPED | OUTPATIENT
Start: 2024-05-09

## 2024-05-09 RX ORDER — VENLAFAXINE HYDROCHLORIDE 75 MG/1
75 CAPSULE, EXTENDED RELEASE ORAL DAILY
Qty: 90 CAPSULE | Refills: 1 | Status: SHIPPED | OUTPATIENT
Start: 2024-05-09

## 2024-05-09 RX ORDER — RIMEGEPANT SULFATE 75 MG/75MG
75 TABLET, ORALLY DISINTEGRATING ORAL ONCE AS NEEDED
Qty: 10 TABLET | Refills: 0 | COMMUNITY
Start: 2024-05-09

## 2024-05-09 NOTE — PROGRESS NOTES
"    Office Note     Name: Ronnie Reveles  : 1980   MRN: [id number]     Chief Complaint:  Migraine (1 mo f/u on migraine medication samples. Pt states they helped, not as good as the shot but it helped.) and Hemorrhoids (Possible hemorrhoids pt's wife states he has been passing blood recently but does not like to talk about it. )    Subjective     History of Present Illness:  Ronnie Reveles is a 44 y.o. male who presents today for hemorrhoids and migraine.  Patient reports that sample of Nurtec did help with the migraine.  Not currently taking anything for prophylaxis.  Reports that he only gets 3-4 migraines per month.    Patient's wife reports patient has been passing blood with bowel movements recently.  He does have some constipation.  Water intake is not the best.      I have reviewed the following portions of the patient's history and these were updated and discussed with the patient as appropriate: past family history, past medical history, past social history, past surgical history, and problem list.     Objective     Vital Signs  /84   Pulse 98   Temp 97.2 °F (36.2 °C) (Temporal)   Resp 16   Ht 167.6 cm (66\")   Wt 85.7 kg (189 lb)   SpO2 98%   BMI 30.51 kg/m²   Estimated body mass index is 30.51 kg/m² as calculated from the following:    Height as of this encounter: 167.6 cm (66\").    Weight as of this encounter: 85.7 kg (189 lb).    Physical Exam  Vitals reviewed.   Constitutional:       General: He is not in acute distress.     Appearance: Normal appearance. He is not ill-appearing.   HENT:      Head: Normocephalic.      Right Ear: Tympanic membrane, ear canal and external ear normal.      Left Ear: Tympanic membrane, ear canal and external ear normal.      Nose: Nose normal. Congestion present. No rhinorrhea.      Mouth/Throat:      Pharynx: Posterior oropharyngeal erythema present. No oropharyngeal exudate.   Eyes:      Extraocular Movements: Extraocular movements intact. "   Cardiovascular:      Rate and Rhythm: Normal rate and regular rhythm.      Heart sounds: Normal heart sounds. No murmur heard.  Pulmonary:      Effort: Pulmonary effort is normal. No respiratory distress.      Breath sounds: Normal breath sounds. No stridor. No wheezing, rhonchi or rales.   Chest:      Chest wall: No tenderness.   Musculoskeletal:         General: Normal range of motion.   Skin:     General: Skin is warm and dry.   Neurological:      Mental Status: He is alert and oriented to person, place, and time.   Psychiatric:         Mood and Affect: Mood normal.         Behavior: Behavior normal.            Assessment and Plan     Diagnoses and all orders for this visit:    1. Chronic allergic rhinitis (Primary)  -     levocetirizine (XYZAL) 5 MG tablet; Take 1 tablet by mouth Every Evening.  Dispense: 30 tablet; Refill: 2  -     fluticasone (FLONASE) 50 MCG/ACT nasal spray; 2 sprays into the nostril(s) as directed by provider Daily.  Dispense: 18.2 mL; Refill: 2    2. Migraine with aura and without status migrainosus, not intractable  -     Rimegepant Sulfate (Nurtec) 75 MG tablet dispersible tablet; Take 1 tablet by mouth 1 (One) Time As Needed (migraine) for up to 10 doses.  Dispense: 10 tablet; Refill: 0  -     venlafaxine XR (EFFEXOR-XR) 75 MG 24 hr capsule; Take 1 capsule by mouth Daily.  Dispense: 90 capsule; Refill: 1    3. Panic disorder with agoraphobia, mild agoraphobic avoidance and moderate panic attacks  -     venlafaxine XR (EFFEXOR-XR) 75 MG 24 hr capsule; Take 1 capsule by mouth Daily.  Dispense: 90 capsule; Refill: 1    4. Hemorrhoids, unspecified hemorrhoid type    Start Xyzal and Flonase for patient's chronic allergic rhinitis  Prescription for Nurtec for abortive medication for migraines.  Will start Effexor for patient's panic disorder  Advised conservative measures for hemorrhoids including sitz bath, increase water intake, increase fiber intake, advised not spending long time with  BM             Discussion Summary:     Discussed plan of care in detail with patient today. Patient was encouraged to keep me informed of any acute changes, lack of improvement, or any new concerning symptoms.  Patient is also aware of reasons to seek emergent care. Patient verbalized understanding and agrees with plan of care.    This visit was billed based on MDM.    Follow Up  Return for Next scheduled follow up.    Please note that portions of this note may have been completed with a voice recognition program.     Ellis Pike MD, MPH  [facility] [place]

## 2024-05-17 DIAGNOSIS — B35.4 TINEA CORPORIS: ICD-10-CM

## 2024-05-17 RX ORDER — CLOTRIMAZOLE AND BETAMETHASONE DIPROPIONATE 10; .64 MG/G; MG/G
CREAM TOPICAL SEE ADMIN INSTRUCTIONS
Qty: 45 G | Refills: 0 | Status: SHIPPED | OUTPATIENT
Start: 2024-05-17

## 2024-05-28 DIAGNOSIS — E78.5 DYSLIPIDEMIA: ICD-10-CM

## 2024-05-28 RX ORDER — ROSUVASTATIN CALCIUM 10 MG/1
10 TABLET, COATED ORAL DAILY
Qty: 30 TABLET | Refills: 0 | OUTPATIENT
Start: 2024-05-28

## 2024-06-24 DIAGNOSIS — F40.01 PANIC DISORDER WITH AGORAPHOBIA, MILD AGORAPHOBIC AVOIDANCE AND MODERATE PANIC ATTACKS: ICD-10-CM

## 2024-06-24 RX ORDER — DIAZEPAM 2 MG/1
3 TABLET ORAL EVERY 12 HOURS PRN
Qty: 60 TABLET | Refills: 2 | Status: SHIPPED | OUTPATIENT
Start: 2024-06-24

## 2024-06-24 NOTE — TELEPHONE ENCOUNTER
Rx Refill Note  Requested Prescriptions     Pending Prescriptions Disp Refills    diazePAM (VALIUM) 2 MG tablet 60 tablet 2     Sig: Take 1.5 tablets by mouth Every 12 (Twelve) Hours As Needed for Anxiety.      Last office visit with prescribing clinician: 3/25/2024   Last telemedicine visit with prescribing clinician: Visit date not found   Next office visit with prescribing clinician: 9/30/2024     OK to fill?    Brittny Holland MA  06/24/24, 08:59 EDT

## 2024-07-03 ENCOUNTER — OFFICE VISIT (OUTPATIENT)
Dept: FAMILY MEDICINE CLINIC | Facility: CLINIC | Age: 44
End: 2024-07-03
Payer: COMMERCIAL

## 2024-07-03 VITALS
WEIGHT: 185 LBS | DIASTOLIC BLOOD PRESSURE: 90 MMHG | SYSTOLIC BLOOD PRESSURE: 125 MMHG | HEART RATE: 85 BPM | OXYGEN SATURATION: 96 % | BODY MASS INDEX: 29.73 KG/M2 | HEIGHT: 66 IN

## 2024-07-03 DIAGNOSIS — R10.10 PAIN OF UPPER ABDOMEN: Primary | ICD-10-CM

## 2024-07-03 DIAGNOSIS — K59.00 CONSTIPATION, UNSPECIFIED CONSTIPATION TYPE: ICD-10-CM

## 2024-07-03 DIAGNOSIS — R10.13 EPIGASTRIC PAIN: ICD-10-CM

## 2024-07-03 PROCEDURE — 99213 OFFICE O/P EST LOW 20 MIN: CPT | Performed by: NURSE PRACTITIONER

## 2024-07-03 RX ORDER — FAMOTIDINE 20 MG/1
20 TABLET, FILM COATED ORAL 2 TIMES DAILY
Qty: 14 TABLET | Refills: 0 | Status: SHIPPED | OUTPATIENT
Start: 2024-07-03 | End: 2024-07-10

## 2024-07-03 RX ORDER — ASPIRIN 81 MG/1
81 TABLET ORAL DAILY
COMMUNITY

## 2024-07-03 NOTE — PROGRESS NOTES
Subjective     Chief Complaint:    Chief Complaint   Patient presents with    Abdominal Pain     Pt sts he ate dinner around 4  and it hurt to swallow, felt like an air bubble, having pain on left side radiating around his back       History of Present Illness:   Abdominal pain x 2 weeks, worsening  LUQ pain, wrapped around the front of his abdomen to his right side  Did have some trouble swallowing and felt like something was stuck and had air bubble in esophagus, he is now having pain in epigastric area, severe at times, will bend him over to knees, pain is exaggerbted by movement, deep breaths, eating  Occasional nsaid use  Fatty liver   Unsure about FH   No weight loss  Hx of cholecystectomy  Did have mild constipation when all this started but has not been having regular BM x 2 days       Review of Systems  Gen- No fevers, chills  CV- No chest pain, palpitations  Resp- No cough, dyspnea  GI- No N/V/D, abd pain  Neuro-No dizziness, headaches      I have reviewed and/or updated the patient's past medical, surgical, family, social history and problem list as appropriate.     Medications:    Current Outpatient Medications:     albuterol sulfate  (90 Base) MCG/ACT inhaler, Inhale 2 puffs 4 (Four) Times a Day., Disp: 18 g, Rfl: 1    aspirin 81 MG EC tablet, Take 1 tablet by mouth Daily., Disp: , Rfl:     butalbital-acetaminophen-caffeine (Esgic) -40 MG per tablet, Take 1 tablet by mouth Every 4 (Four) Hours As Needed for Headache or Migraine., Disp: 20 tablet, Rfl: 0    clotrimazole-betamethasone (LOTRISONE) 1-0.05 % cream, APPLY  CREAM TOPICALLY TO AFFECTED AREA TWICE DAILY, Disp: 45 g, Rfl: 0    diazePAM (VALIUM) 2 MG tablet, Take 1.5 tablets by mouth Every 12 (Twelve) Hours As Needed for Anxiety., Disp: 60 tablet, Rfl: 2    fluticasone (FLONASE) 50 MCG/ACT nasal spray, 2 sprays into the nostril(s) as directed by provider Daily., Disp: 18.2 mL, Rfl: 2    levocetirizine (XYZAL) 5 MG tablet, Take 1  "tablet by mouth Every Evening., Disp: 30 tablet, Rfl: 2    lisinopril (PRINIVIL,ZESTRIL) 10 MG tablet, Take 1 tablet by mouth Daily., Disp: 90 tablet, Rfl: 2    pantoprazole (PROTONIX) 40 MG EC tablet, TAKE 1  BY MOUTH ONCE DAILY 30 MINUTES BEFORE A MEAL, Disp: 90 tablet, Rfl: 0    Rimegepant Sulfate (Nurtec) 75 MG tablet dispersible tablet, Take 1 tablet by mouth 1 (One) Time As Needed (migraine) for up to 10 doses., Disp: 10 tablet, Rfl: 0    rosuvastatin (CRESTOR) 10 MG tablet, Take 1 tablet by mouth Daily., Disp: 30 tablet, Rfl: 11    umeclidinium-vilanterol (ANORO ELLIPTA) 62.5-25 MCG/INH aerosol powder  inhaler, Inhale 1 puff Daily., Disp: , Rfl:     venlafaxine XR (EFFEXOR-XR) 75 MG 24 hr capsule, Take 1 capsule by mouth Daily., Disp: 90 capsule, Rfl: 1    vitamin C (ASCORBIC ACID) 250 MG tablet, Take 1 tablet by mouth Daily., Disp: , Rfl:     vitamin D3 125 MCG (5000 UT) capsule capsule, Take 1 capsule by mouth Daily., Disp: , Rfl:     famotidine (Pepcid) 20 MG tablet, Take 1 tablet by mouth 2 (Two) Times a Day for 7 days., Disp: 14 tablet, Rfl: 0    Allergies:  Allergies   Allergen Reactions    Fenofibrate Rash    Atorvastatin Myalgia    Sulfa Antibiotics Hives    Tramadol Hives    Zoloft [Sertraline] Irritability       Objective     Vital Signs:   Vitals:    07/03/24 1033   BP: 125/90   Pulse: 85   SpO2: 96%   Weight: 83.9 kg (185 lb)   Height: 167.6 cm (66\")     Body mass index is 29.86 kg/m².    Physical Exam:    Physical Exam  Vitals and nursing note reviewed.   Constitutional:       Appearance: He is well-developed.   HENT:      Head: Normocephalic and atraumatic.   Eyes:      Pupils: Pupils are equal, round, and reactive to light.   Cardiovascular:      Rate and Rhythm: Normal rate and regular rhythm.      Heart sounds: Normal heart sounds.   Pulmonary:      Effort: Pulmonary effort is normal.      Breath sounds: Normal breath sounds.   Abdominal:      General: Bowel sounds are normal. There is no " distension.      Palpations: Abdomen is soft.      Tenderness: There is no abdominal tenderness.   Musculoskeletal:      Cervical back: Neck supple.   Skin:     General: Skin is warm and dry.   Neurological:      General: No focal deficit present.      Mental Status: He is alert and oriented to person, place, and time.   Psychiatric:         Mood and Affect: Mood normal.         Behavior: Behavior normal.         Assessment / Plan     Assessment/Plan:   Problem List Items Addressed This Visit    None  Visit Diagnoses       Pain of upper abdomen    -  Primary    Relevant Medications    famotidine (Pepcid) 20 MG tablet    Other Relevant Orders    Comprehensive Metabolic Panel    Lipase    Constipation, unspecified constipation type                  Discussed plan of care in detail with pt today; pt verb understanding and agrees.    Follow up:  As needed    Electronically signed by HAKEEM Pantoja   07/03/2024 10:40 EDT      Please note that portions of this note were completed with a voice recognition program.

## 2024-07-04 LAB
ALBUMIN SERPL-MCNC: 4.7 G/DL (ref 3.5–5.2)
ALBUMIN/GLOB SERPL: 1.7 G/DL
ALP SERPL-CCNC: 90 U/L (ref 39–117)
ALT SERPL-CCNC: 38 U/L (ref 1–41)
AST SERPL-CCNC: 30 U/L (ref 1–40)
BASOPHILS # BLD AUTO: 0.04 10*3/MM3 (ref 0–0.2)
BASOPHILS NFR BLD AUTO: 0.5 % (ref 0–1.5)
BILIRUB SERPL-MCNC: 0.3 MG/DL (ref 0–1.2)
BUN SERPL-MCNC: 12 MG/DL (ref 6–20)
BUN/CREAT SERPL: 12.9 (ref 7–25)
CALCIUM SERPL-MCNC: 10 MG/DL (ref 8.6–10.5)
CHLORIDE SERPL-SCNC: 103 MMOL/L (ref 98–107)
CO2 SERPL-SCNC: 23.2 MMOL/L (ref 22–29)
CREAT SERPL-MCNC: 0.93 MG/DL (ref 0.76–1.27)
EGFRCR SERPLBLD CKD-EPI 2021: 102.6 ML/MIN/1.73
EOSINOPHIL # BLD AUTO: 0.12 10*3/MM3 (ref 0–0.4)
EOSINOPHIL NFR BLD AUTO: 1.5 % (ref 0.3–6.2)
ERYTHROCYTE [DISTWIDTH] IN BLOOD BY AUTOMATED COUNT: 12.9 % (ref 12.3–15.4)
GLOBULIN SER CALC-MCNC: 2.7 GM/DL
GLUCOSE SERPL-MCNC: 97 MG/DL (ref 65–99)
HCT VFR BLD AUTO: 42.4 % (ref 37.5–51)
HGB BLD-MCNC: 14.4 G/DL (ref 13–17.7)
IMM GRANULOCYTES # BLD AUTO: 0.02 10*3/MM3 (ref 0–0.05)
IMM GRANULOCYTES NFR BLD AUTO: 0.2 % (ref 0–0.5)
LIPASE SERPL-CCNC: 31 U/L (ref 13–60)
LYMPHOCYTES # BLD AUTO: 3.21 10*3/MM3 (ref 0.7–3.1)
LYMPHOCYTES NFR BLD AUTO: 39.8 % (ref 19.6–45.3)
MCH RBC QN AUTO: 30.1 PG (ref 26.6–33)
MCHC RBC AUTO-ENTMCNC: 34 G/DL (ref 31.5–35.7)
MCV RBC AUTO: 88.7 FL (ref 79–97)
MONOCYTES # BLD AUTO: 0.55 10*3/MM3 (ref 0.1–0.9)
MONOCYTES NFR BLD AUTO: 6.8 % (ref 5–12)
NEUTROPHILS # BLD AUTO: 4.12 10*3/MM3 (ref 1.7–7)
NEUTROPHILS NFR BLD AUTO: 51.2 % (ref 42.7–76)
NRBC BLD AUTO-RTO: 0 /100 WBC (ref 0–0.2)
PLATELET # BLD AUTO: 327 10*3/MM3 (ref 140–450)
POTASSIUM SERPL-SCNC: 4.1 MMOL/L (ref 3.5–5.2)
PROT SERPL-MCNC: 7.4 G/DL (ref 6–8.5)
RBC # BLD AUTO: 4.78 10*6/MM3 (ref 4.14–5.8)
SODIUM SERPL-SCNC: 138 MMOL/L (ref 136–145)
WBC # BLD AUTO: 8.06 10*3/MM3 (ref 3.4–10.8)

## 2024-07-15 DIAGNOSIS — E78.5 DYSLIPIDEMIA: ICD-10-CM

## 2024-07-16 RX ORDER — ROSUVASTATIN CALCIUM 10 MG/1
10 TABLET, COATED ORAL DAILY
Qty: 30 TABLET | Refills: 0 | OUTPATIENT
Start: 2024-07-16

## 2024-07-30 DIAGNOSIS — E78.5 DYSLIPIDEMIA: ICD-10-CM

## 2024-07-31 RX ORDER — ROSUVASTATIN CALCIUM 10 MG/1
10 TABLET, COATED ORAL DAILY
Qty: 30 TABLET | Refills: 11 | OUTPATIENT
Start: 2024-07-31

## 2024-08-11 DIAGNOSIS — J30.9 CHRONIC ALLERGIC RHINITIS: ICD-10-CM

## 2024-08-14 RX ORDER — LEVOCETIRIZINE DIHYDROCHLORIDE 5 MG/1
5 TABLET, FILM COATED ORAL EVERY EVENING
Qty: 30 TABLET | Refills: 0 | Status: SHIPPED | OUTPATIENT
Start: 2024-08-14

## 2024-08-19 ENCOUNTER — OFFICE VISIT (OUTPATIENT)
Dept: FAMILY MEDICINE CLINIC | Facility: CLINIC | Age: 44
End: 2024-08-19
Payer: COMMERCIAL

## 2024-08-19 VITALS
HEIGHT: 66 IN | BODY MASS INDEX: 29.09 KG/M2 | HEART RATE: 95 BPM | OXYGEN SATURATION: 95 % | SYSTOLIC BLOOD PRESSURE: 120 MMHG | DIASTOLIC BLOOD PRESSURE: 82 MMHG | WEIGHT: 181 LBS

## 2024-08-19 DIAGNOSIS — R53.83 FATIGUE, UNSPECIFIED TYPE: ICD-10-CM

## 2024-08-19 DIAGNOSIS — K21.9 GERD WITHOUT ESOPHAGITIS: ICD-10-CM

## 2024-08-19 DIAGNOSIS — E78.5 DYSLIPIDEMIA: ICD-10-CM

## 2024-08-19 DIAGNOSIS — F40.01 PANIC DISORDER WITH AGORAPHOBIA, MILD AGORAPHOBIC AVOIDANCE AND MODERATE PANIC ATTACKS: ICD-10-CM

## 2024-08-19 DIAGNOSIS — Z12.11 SCREEN FOR COLON CANCER: ICD-10-CM

## 2024-08-19 DIAGNOSIS — K76.0 FATTY LIVER: ICD-10-CM

## 2024-08-19 DIAGNOSIS — I10 ESSENTIAL HYPERTENSION: Primary | ICD-10-CM

## 2024-08-19 PROCEDURE — 99214 OFFICE O/P EST MOD 30 MIN: CPT | Performed by: NURSE PRACTITIONER

## 2024-08-19 RX ORDER — DIAZEPAM 2 MG/1
3 TABLET ORAL EVERY 12 HOURS PRN
Qty: 60 TABLET | Refills: 2 | Status: SHIPPED | OUTPATIENT
Start: 2024-08-19

## 2024-08-19 RX ORDER — ROSUVASTATIN CALCIUM 10 MG/1
10 TABLET, COATED ORAL NIGHTLY
Qty: 90 TABLET | Refills: 3 | Status: SHIPPED | OUTPATIENT
Start: 2024-08-19

## 2024-08-19 RX ORDER — PANTOPRAZOLE SODIUM 40 MG/1
40 TABLET, DELAYED RELEASE ORAL DAILY
Qty: 90 TABLET | Refills: 3 | Status: SHIPPED | OUTPATIENT
Start: 2024-08-19

## 2024-08-19 RX ORDER — LISINOPRIL 10 MG/1
10 TABLET ORAL DAILY
Qty: 90 TABLET | Refills: 3 | Status: SHIPPED | OUTPATIENT
Start: 2024-08-19

## 2024-08-19 NOTE — PROGRESS NOTES
Subjective     Chief Complaint:    Chief Complaint   Patient presents with    Med Refill     B12 check       History of Present Illness:   Here for f/u of chronic conditions  Migraines/HTN/HLD/anxiety  Tolerating current medications  Takes valium in the evening and does not feel like it makes him sleepy during the day as it is wore off  Wife denies regular snoring   Active at his job but no structured   Has been trying to eat healthier and has lost weight       Review of Systems  Gen- No fevers, chills  CV- No chest pain, palpitations  Resp- No cough, dyspnea  GI- No N/V/D, abd pain  Neuro-No dizziness, headaches      I have reviewed and/or updated the patient's past medical, surgical, family, social history and problem list as appropriate.     Medications:    Current Outpatient Medications:     albuterol sulfate  (90 Base) MCG/ACT inhaler, Inhale 2 puffs 4 (Four) Times a Day., Disp: 18 g, Rfl: 1    aspirin 81 MG EC tablet, Take 1 tablet by mouth Daily., Disp: , Rfl:     butalbital-acetaminophen-caffeine (Esgic) -40 MG per tablet, Take 1 tablet by mouth Every 4 (Four) Hours As Needed for Headache or Migraine., Disp: 20 tablet, Rfl: 0    clotrimazole-betamethasone (LOTRISONE) 1-0.05 % cream, APPLY  CREAM TOPICALLY TO AFFECTED AREA TWICE DAILY, Disp: 45 g, Rfl: 0    diazePAM (VALIUM) 2 MG tablet, Take 1.5 tablets by mouth Every 12 (Twelve) Hours As Needed for Anxiety., Disp: 60 tablet, Rfl: 2    fluticasone (FLONASE) 50 MCG/ACT nasal spray, 2 sprays into the nostril(s) as directed by provider Daily., Disp: 18.2 mL, Rfl: 2    levocetirizine (XYZAL) 5 MG tablet, TAKE 1 TABLET BY MOUTH ONCE DAILY IN THE EVENING, Disp: 30 tablet, Rfl: 0    lisinopril (PRINIVIL,ZESTRIL) 10 MG tablet, Take 1 tablet by mouth Daily., Disp: 90 tablet, Rfl: 3    pantoprazole (PROTONIX) 40 MG EC tablet, Take 1 tablet by mouth Daily., Disp: 90 tablet, Rfl: 3    Rimegepant Sulfate (Nurtec) 75 MG tablet dispersible tablet, Take 1  "tablet by mouth 1 (One) Time As Needed (migraine) for up to 10 doses., Disp: 10 tablet, Rfl: 0    rosuvastatin (CRESTOR) 10 MG tablet, Take 1 tablet by mouth Every Night., Disp: 90 tablet, Rfl: 3    umeclidinium-vilanterol (ANORO ELLIPTA) 62.5-25 MCG/INH aerosol powder  inhaler, Inhale 1 puff Daily., Disp: , Rfl:     venlafaxine XR (EFFEXOR-XR) 75 MG 24 hr capsule, Take 1 capsule by mouth Daily., Disp: 90 capsule, Rfl: 1    vitamin C (ASCORBIC ACID) 250 MG tablet, Take 1 tablet by mouth Daily., Disp: , Rfl:     vitamin D3 125 MCG (5000 UT) capsule capsule, Take 1 capsule by mouth Daily., Disp: , Rfl:     Allergies:  Allergies   Allergen Reactions    Fenofibrate Rash    Atorvastatin Myalgia    Ibuprofen GI Intolerance    Sulfa Antibiotics Hives    Tramadol Hives    Zoloft [Sertraline] Irritability       Objective     Vital Signs:   Vitals:    08/19/24 1542   BP: 120/82   Pulse: 95   SpO2: 95%   Weight: 82.1 kg (181 lb)   Height: 167.6 cm (66\")     Body mass index is 29.21 kg/m².    Physical Exam:    Physical Exam  Vitals and nursing note reviewed.   Constitutional:       Appearance: He is well-developed.   HENT:      Head: Normocephalic and atraumatic.   Eyes:      Pupils: Pupils are equal, round, and reactive to light.   Cardiovascular:      Rate and Rhythm: Normal rate and regular rhythm.      Heart sounds: Normal heart sounds.   Pulmonary:      Effort: Pulmonary effort is normal.      Breath sounds: Normal breath sounds.   Abdominal:      General: Bowel sounds are normal. There is no distension.      Palpations: Abdomen is soft.      Tenderness: There is no abdominal tenderness.   Musculoskeletal:      Cervical back: Neck supple.   Skin:     General: Skin is warm and dry.   Neurological:      General: No focal deficit present.      Mental Status: He is alert and oriented to person, place, and time.   Psychiatric:         Mood and Affect: Mood normal.         Behavior: Behavior normal.         Assessment / Plan "     Assessment/Plan:   Problem List Items Addressed This Visit       Dyslipidemia    Relevant Medications    rosuvastatin (CRESTOR) 10 MG tablet    Essential hypertension - Primary    Relevant Medications    lisinopril (PRINIVIL,ZESTRIL) 10 MG tablet    GERD without esophagitis    Relevant Medications    pantoprazole (PROTONIX) 40 MG EC tablet    Panic disorder with agoraphobia, mild agoraphobic avoidance and moderate panic attacks    Relevant Medications    venlafaxine XR (EFFEXOR-XR) 75 MG 24 hr capsule    diazePAM (VALIUM) 2 MG tablet     Other Visit Diagnoses       Screen for colon cancer        Relevant Orders    Ambulatory Referral to Gastroenterology    Fatty liver        Relevant Orders    Ambulatory Referral to Gastroenterology    Fatigue, unspecified type        Relevant Orders    Comprehensive Metabolic Panel    CBC Auto Differential    TSH Rfx On Abnormal To Free T4    Vitamin B12    Folate    Ferritin    Iron Profile    Vitamin D,25-Hydroxy    Testosterone          -- RTC for labs  -- BP stable  -- The current medical regimen is effective;  continue present plan and medications.      Discussed plan of care in detail with pt today; pt verb understanding and agrees.    Follow up:  3 months PCP    Electronically signed by HAKEEM Pantoja   08/19/2024 16:14 EDT      Please note that portions of this note were completed with a voice recognition program.

## 2024-08-24 ENCOUNTER — LAB (OUTPATIENT)
Dept: LAB | Facility: HOSPITAL | Age: 44
End: 2024-08-24
Payer: COMMERCIAL

## 2024-08-24 LAB
25(OH)D3 SERPL-MCNC: 52.6 NG/ML (ref 30–100)
ALBUMIN SERPL-MCNC: 4.6 G/DL (ref 3.5–5.2)
ALBUMIN/GLOB SERPL: 1.5 G/DL
ALP SERPL-CCNC: 85 U/L (ref 39–117)
ALT SERPL W P-5'-P-CCNC: 38 U/L (ref 1–41)
ANION GAP SERPL CALCULATED.3IONS-SCNC: 12 MMOL/L (ref 5–15)
AST SERPL-CCNC: 41 U/L (ref 1–40)
BASOPHILS # BLD AUTO: 0.05 10*3/MM3 (ref 0–0.2)
BASOPHILS NFR BLD AUTO: 0.6 % (ref 0–1.5)
BILIRUB SERPL-MCNC: 0.5 MG/DL (ref 0–1.2)
BUN SERPL-MCNC: 14 MG/DL (ref 6–20)
BUN/CREAT SERPL: 11.4 (ref 7–25)
CALCIUM SPEC-SCNC: 9.9 MG/DL (ref 8.6–10.5)
CHLORIDE SERPL-SCNC: 101 MMOL/L (ref 98–107)
CO2 SERPL-SCNC: 26 MMOL/L (ref 22–29)
CREAT SERPL-MCNC: 1.23 MG/DL (ref 0.76–1.27)
DEPRECATED RDW RBC AUTO: 40.2 FL (ref 37–54)
EGFRCR SERPLBLD CKD-EPI 2021: 73.3 ML/MIN/1.73
EOSINOPHIL # BLD AUTO: 0.18 10*3/MM3 (ref 0–0.4)
EOSINOPHIL NFR BLD AUTO: 2.1 % (ref 0.3–6.2)
ERYTHROCYTE [DISTWIDTH] IN BLOOD BY AUTOMATED COUNT: 12.4 % (ref 12.3–15.4)
FERRITIN SERPL-MCNC: 219 NG/ML (ref 30–400)
FOLATE SERPL-MCNC: 11.9 NG/ML (ref 4.78–24.2)
GLOBULIN UR ELPH-MCNC: 3 GM/DL
GLUCOSE SERPL-MCNC: 98 MG/DL (ref 65–99)
HCT VFR BLD AUTO: 44.4 % (ref 37.5–51)
HGB BLD-MCNC: 14.5 G/DL (ref 13–17.7)
IMM GRANULOCYTES # BLD AUTO: 0.01 10*3/MM3 (ref 0–0.05)
IMM GRANULOCYTES NFR BLD AUTO: 0.1 % (ref 0–0.5)
IRON 24H UR-MRATE: 136 MCG/DL (ref 59–158)
IRON SATN MFR SERPL: 35 % (ref 20–50)
LYMPHOCYTES # BLD AUTO: 2.89 10*3/MM3 (ref 0.7–3.1)
LYMPHOCYTES NFR BLD AUTO: 34.2 % (ref 19.6–45.3)
MCH RBC QN AUTO: 29.1 PG (ref 26.6–33)
MCHC RBC AUTO-ENTMCNC: 32.7 G/DL (ref 31.5–35.7)
MCV RBC AUTO: 89 FL (ref 79–97)
MONOCYTES # BLD AUTO: 0.55 10*3/MM3 (ref 0.1–0.9)
MONOCYTES NFR BLD AUTO: 6.5 % (ref 5–12)
NEUTROPHILS NFR BLD AUTO: 4.77 10*3/MM3 (ref 1.7–7)
NEUTROPHILS NFR BLD AUTO: 56.5 % (ref 42.7–76)
NRBC BLD AUTO-RTO: 0 /100 WBC (ref 0–0.2)
PLATELET # BLD AUTO: 327 10*3/MM3 (ref 140–450)
PMV BLD AUTO: 10 FL (ref 6–12)
POTASSIUM SERPL-SCNC: 4.7 MMOL/L (ref 3.5–5.2)
PROT SERPL-MCNC: 7.6 G/DL (ref 6–8.5)
RBC # BLD AUTO: 4.99 10*6/MM3 (ref 4.14–5.8)
SODIUM SERPL-SCNC: 139 MMOL/L (ref 136–145)
TESTOST SERPL-MCNC: 200 NG/DL (ref 249–836)
TIBC SERPL-MCNC: 392 MCG/DL (ref 298–536)
TRANSFERRIN SERPL-MCNC: 263 MG/DL (ref 200–360)
TSH SERPL DL<=0.05 MIU/L-ACNC: 1 UIU/ML (ref 0.27–4.2)
VIT B12 BLD-MCNC: 610 PG/ML (ref 211–946)
WBC NRBC COR # BLD AUTO: 8.45 10*3/MM3 (ref 3.4–10.8)

## 2024-08-24 PROCEDURE — 83540 ASSAY OF IRON: CPT | Performed by: NURSE PRACTITIONER

## 2024-08-24 PROCEDURE — 82306 VITAMIN D 25 HYDROXY: CPT | Performed by: NURSE PRACTITIONER

## 2024-08-24 PROCEDURE — 82746 ASSAY OF FOLIC ACID SERUM: CPT | Performed by: NURSE PRACTITIONER

## 2024-08-24 PROCEDURE — 80050 GENERAL HEALTH PANEL: CPT | Performed by: NURSE PRACTITIONER

## 2024-08-24 PROCEDURE — 84466 ASSAY OF TRANSFERRIN: CPT | Performed by: NURSE PRACTITIONER

## 2024-08-24 PROCEDURE — 84403 ASSAY OF TOTAL TESTOSTERONE: CPT | Performed by: NURSE PRACTITIONER

## 2024-08-24 PROCEDURE — 82607 VITAMIN B-12: CPT | Performed by: NURSE PRACTITIONER

## 2024-08-24 PROCEDURE — 82728 ASSAY OF FERRITIN: CPT | Performed by: NURSE PRACTITIONER

## 2024-08-24 PROCEDURE — 36415 COLL VENOUS BLD VENIPUNCTURE: CPT | Performed by: NURSE PRACTITIONER

## 2024-09-03 DIAGNOSIS — R79.89 LOW TESTOSTERONE IN MALE: Primary | ICD-10-CM

## 2024-09-06 NOTE — TELEPHONE ENCOUNTER
[facility]       Name: Amandeep Lara (Amandeep Lara)  /Age: 1958/66 y.o.         Date of Consult: 24    Referring Provider: Dr. Momin    Surgery, Date, and Length:     Robotic assisted RUL wedge resection with possible lobectomy, MLND - Right, 09/10/24, 225 min       Amandeep Lara is a  66 year-old male who presents to the [facility] for perioperative risk assessment prior to surgery.    Patient presents with a primary diagnosis of Lung nodule, solitary.     Amandeep Lara  is a 66 y.o. male with a pmhx of CAD, HTN, HLD, DM, PAD, stroke, COPD and former smoker who presents today for lung nodule workup follow up. Hi cardiologist Dr Quinones has cleared him for surgery from cardiac standpoint. He is otherwise doing well. Deny SOB or GONZALEZ.      This note was created in part upon personal review of patient's medical records.      Patient is scheduled to have a Robotic assisted RUL wedge resection with possible lobectomy, MLND - Right.      Pt denies any past history of anesthetic complications such as PONV, awareness, prolonged sedation, dental damage, aspiration, cardiac arrest, difficult intubation, difficult I.V. access or unexpected hospital admissions.  NO malignant hyperthermia and or pseudocholinesterase deficiency.  No history of blood transfusions     The patient is not a Protestant and will accept blood and blood products if medically indicated.   Type and screen sent.     Past Medical History:   Diagnosis Date    Cataract     COPD (chronic obstructive pulmonary disease) (Multi)     Coronary artery disease     Diabetes mellitus (Multi)     Hyperlipidemia     Hypertension     Immune deficiency disorder (Multi)     Lung nodules     Personal history of other diseases of the circulatory system     History of hypertension    Personal history of other endocrine, nutritional and metabolic disease     History of diabetes mellitus    Sleep apnea     sleep study done/unknown results     Provider: DR. CRISTINA    Caller: CHAYITO CAMARENA     Phone Number: [phone]    Reason for Call: PATIENTS WIFE IS CALLING BACK TO CHECK THE STATUS OF THIS    Stroke (Multi)     2023        Past Surgical History:   Procedure Laterality Date    CARPAL TUNNEL RELEASE  2015    Neuroplasty Decompression Median Nerve At Carpal Tunnel    CT ANGIO NECK  2021    CT NECK ANGIO W AND WO IV CONTRAST 6/3/2021 [facility]    CT HEAD ANGIO W AND WO IV CONTRAST  2021    CT HEAD ANGIO W AND WO IV CONTRAST 6/3/2021 [facility]    MR HEAD ANGIO WO IV CONTRAST  2021    MR HEAD ANGIO WO IV CONTRAST 2021 [facility]    MR HEAD ANGIO WO IV CONTRAST  2021    MR HEAD ANGIO WO IV CONTRAST    NECK SURGERY  2021    Neck Surgery    OTHER SURGICAL HISTORY  2021    Surgery    OTHER SURGICAL HISTORY  2021    Cardiac catheterization    OTHER SURGICAL HISTORY  2021    Finger amputation    OTHER SURGICAL HISTORY  2021    Colonoscopy    OTHER SURGICAL HISTORY  2021    Wrist surgery    OTHER SURGICAL HISTORY      Tailbone    ROTATOR CUFF REPAIR  2021    Rotator Cuff Repair    TONSILLECTOMY  2015    Tonsillectomy     Social History     Socioeconomic History    Marital status:      Spouse name: Not on file    Number of children: Not on file    Years of education: Not on file    Highest education level: Not on file   Occupational History    Not on file   Tobacco Use    Smoking status: Former     Current packs/day: 0.00     Types: Cigarettes     Quit date: 10/2023     Years since quittin.9     Passive exposure: Never    Smokeless tobacco: Never   Vaping Use    Vaping status: Never Used   Substance and Sexual Activity    Alcohol use: Yes     Comment: 2-3x a year    Drug use: Never    Sexual activity: Yes     Partners: Female     Birth control/protection: None   Other Topics Concern    Not on file   Social History Narrative    Not on file     Social Determinants of Health     Financial Resource Strain: Low Risk  (3/20/2024)    Overall Financial Resource Strain (CARDIA)     Difficulty of  Paying Living Expenses: Not very hard   Food Insecurity: Patient Declined (10/20/2023)    Hunger Vital Sign     Worried About Running Out of Food in the Last Year: Patient declined     Ran Out of Food in the Last Year: Patient declined   Transportation Needs: No Transportation Needs (3/20/2024)    PRAPARE - Transportation     Lack of Transportation (Medical): No     Lack of Transportation (Non-Medical): No   Physical Activity: Inactive (10/20/2023)    Exercise Vital Sign     Days of Exercise per Week: 0 days     Minutes of Exercise per Session: 0 min   Stress: No Stress Concern Present (10/20/2023)    [employer] [place] [employer] - Occupational Stress Questionnaire     Feeling of Stress : Not at all   Social Connections: Feeling Socially Integrated (11/15/2023)    OASIS : Social Isolation     Frequency of experiencing loneliness or isolation: Never   Recent Concern: Social Connections - Moderately Isolated (10/20/2023)    Social Connection and Isolation Panel [NHANES]     Frequency of Communication with Friends and Family: Three times a week     Frequency of Social Gatherings with Friends and Family: Once a week     Attends Orthodox Services: Never     Active Member of Clubs or Organizations: No     Attends Club or Organization Meetings: Never     Marital Status:    Intimate Partner Violence: Not At Risk (10/20/2023)    Humiliation, Afraid, Rape, and Kick questionnaire     Fear of Current or Ex-Partner: No     Emotionally Abused: No     Physically Abused: No     Sexually Abused: No   Housing Stability: Low Risk  (3/20/2024)    Housing Stability Vital Sign     Unable to Pay for Housing in the Last Year: No     Number of Places Lived in the Last Year: 1     Unstable Housing in the Last Year: No        Family History   Problem Relation Name Age of Onset    Other (malignant neoplasm of urinary bladder) Mother      Other (arteriosclerotic cardiovascular disease) Father      Other (malignant  "neoplasm of prostate) Father      Other (arteriosclerotic cardiovascular disease) Brother         Allergies   Allergen Reactions    Ether Unknown, Nausea And Vomiting and Other    Statins-Hmg-Coa Reductase Inhibitors Other    Sulfa (Sulfonamide Antibiotics) Hives    Sulfamethoxazole-Trimethoprim Other and Rash         Current Outpatient Medications:     acetaminophen (Tylenol) 325 mg tablet, Take 2 tablets (650 mg) by mouth every 4 hours if needed., Disp: , Rfl:     amLODIPine (Norvasc) 5 mg tablet, Take 1 tablet (5 mg) by mouth once daily., Disp: 90 tablet, Rfl: 3    aspirin 81 mg chewable tablet, Chew 1 tablet (81 mg) once daily., Disp: , Rfl:     atorvastatin (Lipitor) 40 mg tablet, Take 1 tablet (40 mg) by mouth once daily at bedtime., Disp: 90 tablet, Rfl: 3    carvedilol (Coreg) 25 mg tablet, TAKE 1 TABLET TWICE DAILY, Disp: 180 tablet, Rfl: 3    clopidogrel (Plavix) 75 mg tablet, Take 1 tablet (75 mg) by mouth once daily., Disp: 90 tablet, Rfl: 3    cyanocobalamin, vitamin B-12, (VITAMIN B-12 ORAL), Take 1 tablet by mouth once daily., Disp: , Rfl:     fish oil concentrate (Omega-3) 120-180 mg capsule, Take 1 capsule (1 g) by mouth once daily., Disp: , Rfl:     metFORMIN (Glucophage) 1,000 mg tablet, Take 1 tablet (1,000 mg) by mouth 2 times daily (morning and late afternoon)., Disp: , Rfl:     NovoLOG Mix 70-30FlexPen U-100 100 unit/mL (70-30) injection, Inject 20 Units under the skin 2 times a day with meals., Disp: 12 mL, Rfl: 1    BD Ultra-Fine Lin Pen Needle 32 gauge x 5/32\" needle, use 1 each 2 times daily, Disp: , Rfl:     chlorhexidine (Peridex) 0.12 % solution, Swish for 30 seconds and spit 15mL of solution the night before and morning of surgery, Disp: 475 mL, Rfl: 0    Contour Next Gen Meter misc, Dispense one meter that insurance will cover., Disp: , Rfl:     insulin lispro (HumaLOG) 100 unit/mL injection, Inject 0-0.1 mL (0-10 Units) under the skin 3 times a day with meals. Take as directed per " "insulin instructions. (Patient not taking: Reported on 9/9/2024), Disp: 9 mL, Rfl: 0    nitroglycerin (Nitrostat) 0.4 mg SL tablet, Place 1 tablet (0.4 mg) under the tongue every 5 minutes if needed for chest pain (up to 3 doses)., Disp: , Rfl:       PAT ROS:   Constitutional:   neg    Neuro/Psych:   Eyes:    use of corrective lenses (does not wear)  Ears:    no hearing aides  Nose:   Mouth:    No teeth/no dentures  Throat:   neg    Neck:   neg    Cardio:    Patient goes up 14 steps to the house. Uses a cane to ambulate.  Mets 2-3   no chest pain   no palpitations   no GONZALEZ  Respiratory:    Suspected sleep apnea  COPD  Endocrine:   GI:   neg     no abdominal pain   no constipation   no diarrhea   no nausea   no vomiting  :   neg    Musculoskeletal:   neg    Hematologic:    bruises/bleeds easily (plavix)   no history of blood transfusion   blood clots (Stroke 10/2023)  Skin:  neg        Physical Exam  Physical exam within normal limits.        PAT AIRWAY:   Airway:     Mallampati::  III    Neck ROM::  Full   No teeth/dentures      Visit Vitals  /59   Pulse 65   Temp 36.4 °C (97.6 °F)   Resp 18   Ht 1.727 m (5' 7.99\")   Wt 102 kg (224 lb 13.9 oz)   SpO2 97%   BMI 34.20 kg/m²   Smoking Status Former   BSA 2.21 m²       Patient blood pressure intially 98/49. Patient stated some lightheadedness in the office    Plan    Neuro:    CVA 10/23    Cardiovascular:    Patient denies any chest pain, tightness, heaviness, pressure, radiating pain, palpitations, irregular heartbeats, lightheadedness, cough, congestion, shortness of breath, GONZALEZ, PND, near syncope, weight loss or gain.    RCRI: 2  Risk of Mace: 10%    HTN  Amlodipine- patient to take on dos  Carvedilol-patient to take on dos    HLD  Atorvastatin-patient to take the night before surgery  Fish oil-patient to hold 7 days prior to surgery    CVA 10/2023  ASA 81 mg to be taken on dos  Plavix-patient to hold 7 days before surgery    CAD    08/08/24  Sinus rhythm "   Right bundle branch block pattern   Inferior infarct remote   Nonspecific ST-T wave changes   Bonifacio QuinonesDO,[facility]     0722/24  CONCLUSIONS:   1. Left ventricular ejection fraction is mildly decreased, by visual estimate at 45-50%.   2. Spectral Doppler shows an impaired relaxation pattern of left ventricular diastolic filling.   3. Severely increased left ventricular septal thickness.   4. The left ventricular posterior wall thickness is moderately increased.   5. There is normal right ventricular global systolic function.   6. Mild to moderate mitral valve regurgitation.   7. Mild (1+) tricuspid regurgitation with estimated RVSP of 20 mmHg.   8. The ascending aorta measures 3.6 cm.   9. Comparison study dated 10/23/23 showed an LVEF 50%. 1+ MR and trivial TR. Negative bubble study. Moderate LVH. JOSE on 10/25/23 showed moderate MR.    07/11/24  IMPRESSION:  Abnormal Lexiscan Myoview cardiac perfusion stress test.  No  myocardial ischemia by perfusion imaging.  No LOCATION myocardial infarction by perfusion imaging. Left  ventricular systolic function. Left ventricular ejection fraction 35  %. When compared to prior study from June 20, 2022, the ejection  fraction has improved from 10%. Non invasive risk stratification is  intermediate risk.    Based on testing results and condition should be acceptable and relatively low risk for the surgical procedure   Can follow perioperatively at the discretion of thoracic surgery  Okay to hold at that platelet medications 5 to 7 days in advance of surgery  Dr. Quinones 08/08/24    Pulm:  COPD    Known and treated  LUIS FELIPE- Patient was instructed to bring CPAP machine the morning of surgery. Recommend prioritizing  nonopioid analgesic techniques (regional and local anesthesia, nonsteroidal medications, etc) before the administration of opioids and close monitoring for hypoventilation after surgery due to LUIS FELIPE. Increased vigilance is recommended with the use of narcotics due to  an increased risk for opioid induced respiratory depression      Renal/endo:  DM Type II-  Metformin-patient to hold on dos  Novolog-with evening dose take half the dose the night before the day of surgery. Hold the day of surgery.        Heme:  Patient instructed to ambulate as soon as possible postoperatively to decrease thromboembolic risk.    Initiate mechanical DVT prophylaxis as soon as possible and initiate chemical prophylaxis when deemed safe from a bleeding standpoint post surgery.    Caprini= 8    Risk assessment complete.  Patient is scheduled for an intermediate/high surgical risk procedure.       Labs/testing obtained in PAT on 09/06/24  CBC, CMP, MRSA, T&S    Lab Results   Component Value Date    WBC 14.8 (H) 09/06/2024    HGB 12.2 (L) 09/06/2024    HCT 37.1 (L) 09/06/2024    MCV 90 09/06/2024     09/06/2024     Lab Results   Component Value Date    GLUCOSE 184 (H) 09/06/2024    CALCIUM 8.2 (L) 09/06/2024     (L) 09/06/2024    K 5.0 09/06/2024    CO2 24 09/06/2024    CL 98 09/06/2024    BUN 24 (H) 09/06/2024    CREATININE 1.86 (H) 09/06/2024     Lab Results   Component Value Date    ALT 10 06/12/2024    AST 10 06/12/2024    ALKPHOS 85 06/12/2024    BILITOT 0.4 06/12/2024     Lab Results   Component Value Date    HGBA1C 8.9 (H) 09/06/2024         Labs reviewed, CBC similar to previous results. HbA1c elevated at 8.9, increased from 8.0 ten months ago. Cr elevated and eGFR decreased from previous results. Contacted Dr. Pedroza via phone call and discussed results. Notified Dr. Momin/discussion of patient being admitted the night before the day of surgery for IV fluids via email.       Preoperative medication instructions were provided and reviewed with the patient.  Any additional testing or evaluation was explained to the patient.  Nothing by mouth instructions were discussed and patient's questions were answered prior to conclusion to this encounter.  Patient verbalized understanding of  preoperative instructions given in preadmission testing; discharge instructions available in EMR.    This note was dictated with speech recognition.  Minor errors may have been detected during use of speech recognition.

## 2024-09-09 DIAGNOSIS — J30.9 CHRONIC ALLERGIC RHINITIS: ICD-10-CM

## 2024-09-11 RX ORDER — LEVOCETIRIZINE DIHYDROCHLORIDE 5 MG/1
5 TABLET, FILM COATED ORAL EVERY EVENING
Qty: 30 TABLET | Refills: 0 | Status: SHIPPED | OUTPATIENT
Start: 2024-09-11

## 2024-10-06 DIAGNOSIS — J30.9 CHRONIC ALLERGIC RHINITIS: ICD-10-CM

## 2024-10-07 RX ORDER — LEVOCETIRIZINE DIHYDROCHLORIDE 5 MG/1
5 TABLET, FILM COATED ORAL EVERY EVENING
Qty: 30 TABLET | Refills: 0 | Status: SHIPPED | OUTPATIENT
Start: 2024-10-07

## 2024-10-15 ENCOUNTER — OFFICE VISIT (OUTPATIENT)
Dept: FAMILY MEDICINE CLINIC | Facility: CLINIC | Age: 44
End: 2024-10-15
Payer: COMMERCIAL

## 2024-10-15 VITALS
BODY MASS INDEX: 28.96 KG/M2 | DIASTOLIC BLOOD PRESSURE: 90 MMHG | RESPIRATION RATE: 16 BRPM | HEIGHT: 66 IN | TEMPERATURE: 98 F | OXYGEN SATURATION: 98 % | SYSTOLIC BLOOD PRESSURE: 138 MMHG | WEIGHT: 180.2 LBS | HEART RATE: 89 BPM

## 2024-10-15 DIAGNOSIS — K21.9 GERD WITHOUT ESOPHAGITIS: ICD-10-CM

## 2024-10-15 DIAGNOSIS — R53.81 MALAISE AND FATIGUE: ICD-10-CM

## 2024-10-15 DIAGNOSIS — I10 ESSENTIAL HYPERTENSION: ICD-10-CM

## 2024-10-15 DIAGNOSIS — J30.9 CHRONIC ALLERGIC RHINITIS: ICD-10-CM

## 2024-10-15 DIAGNOSIS — F40.01 PANIC DISORDER WITH AGORAPHOBIA, MILD AGORAPHOBIC AVOIDANCE AND MODERATE PANIC ATTACKS: ICD-10-CM

## 2024-10-15 DIAGNOSIS — E78.5 DYSLIPIDEMIA: ICD-10-CM

## 2024-10-15 DIAGNOSIS — R53.83 MALAISE AND FATIGUE: ICD-10-CM

## 2024-10-15 DIAGNOSIS — R79.89 LOW TESTOSTERONE IN MALE: Primary | ICD-10-CM

## 2024-10-15 DIAGNOSIS — G43.109 MIGRAINE WITH AURA AND WITHOUT STATUS MIGRAINOSUS, NOT INTRACTABLE: ICD-10-CM

## 2024-10-15 DIAGNOSIS — B35.4 TINEA CORPORIS: ICD-10-CM

## 2024-10-15 PROCEDURE — 99214 OFFICE O/P EST MOD 30 MIN: CPT | Performed by: FAMILY MEDICINE

## 2024-10-15 RX ORDER — ASPIRIN 81 MG/1
81 TABLET ORAL DAILY
Qty: 90 TABLET | Refills: 3 | Status: SHIPPED | OUTPATIENT
Start: 2024-10-15

## 2024-10-15 RX ORDER — DIAZEPAM 2 MG/1
3 TABLET ORAL EVERY 12 HOURS PRN
Qty: 60 TABLET | Refills: 2 | Status: SHIPPED | OUTPATIENT
Start: 2024-10-15

## 2024-10-15 RX ORDER — VENLAFAXINE HYDROCHLORIDE 75 MG/1
75 CAPSULE, EXTENDED RELEASE ORAL DAILY
Qty: 90 CAPSULE | Refills: 3 | Status: SHIPPED | OUTPATIENT
Start: 2024-10-15 | End: 2024-10-15 | Stop reason: SDUPTHER

## 2024-10-15 RX ORDER — LEVOCETIRIZINE DIHYDROCHLORIDE 5 MG/1
5 TABLET, FILM COATED ORAL EVERY EVENING
Qty: 90 TABLET | Refills: 3 | Status: SHIPPED | OUTPATIENT
Start: 2024-10-15

## 2024-10-15 RX ORDER — PANTOPRAZOLE SODIUM 40 MG/1
40 TABLET, DELAYED RELEASE ORAL DAILY
Qty: 90 TABLET | Refills: 3 | Status: SHIPPED | OUTPATIENT
Start: 2024-10-15

## 2024-10-15 RX ORDER — ROSUVASTATIN CALCIUM 10 MG/1
10 TABLET, COATED ORAL NIGHTLY
Qty: 90 TABLET | Refills: 3 | Status: SHIPPED | OUTPATIENT
Start: 2024-10-15

## 2024-10-15 RX ORDER — ALBUTEROL SULFATE 90 UG/1
2 INHALANT RESPIRATORY (INHALATION) 4 TIMES DAILY
Qty: 18 G | Refills: 1 | Status: SHIPPED | OUTPATIENT
Start: 2024-10-15

## 2024-10-15 RX ORDER — CLOTRIMAZOLE AND BETAMETHASONE DIPROPIONATE 10; .64 MG/G; MG/G
CREAM TOPICAL SEE ADMIN INSTRUCTIONS
Qty: 45 G | Refills: 0 | Status: SHIPPED | OUTPATIENT
Start: 2024-10-15

## 2024-10-15 RX ORDER — LISINOPRIL 10 MG/1
10 TABLET ORAL DAILY
Qty: 90 TABLET | Refills: 3 | Status: SHIPPED | OUTPATIENT
Start: 2024-10-15

## 2024-10-15 RX ORDER — BUTALBITAL, ACETAMINOPHEN AND CAFFEINE 50; 325; 40 MG/1; MG/1; MG/1
1 TABLET ORAL EVERY 4 HOURS PRN
Qty: 20 TABLET | Refills: 0 | Status: SHIPPED | OUTPATIENT
Start: 2024-10-15

## 2024-10-15 RX ORDER — VENLAFAXINE HYDROCHLORIDE 150 MG/1
150 CAPSULE, EXTENDED RELEASE ORAL DAILY
Qty: 90 CAPSULE | Refills: 2 | Status: SHIPPED | OUTPATIENT
Start: 2024-10-15

## 2024-10-15 RX ORDER — RIMEGEPANT SULFATE 75 MG/75MG
75 TABLET, ORALLY DISINTEGRATING ORAL ONCE AS NEEDED
Qty: 10 TABLET | Refills: 3 | Status: SHIPPED | OUTPATIENT
Start: 2024-10-15

## 2024-10-15 RX ORDER — MULTIVIT WITH MINERALS/LUTEIN
250 TABLET ORAL DAILY
Qty: 90 TABLET | Refills: 3 | Status: SHIPPED | OUTPATIENT
Start: 2024-10-15

## 2024-10-15 RX ORDER — FLUTICASONE PROPIONATE 50 MCG
2 SPRAY, SUSPENSION (ML) NASAL DAILY
Qty: 18.2 ML | Refills: 2 | Status: SHIPPED | OUTPATIENT
Start: 2024-10-15

## 2024-10-15 NOTE — PROGRESS NOTES
Established Patient        Chief Complaint:   Chief Complaint   Patient presents with    Hypertension     Pt is here for a 3 month follow up and to go over some labs           Ronnie Reveles is a 44 y.o. male    History of Present Illness:   Here today in scheduled follow-up visit of his panic disorder, hypertension, GERD, migraine headache disorder, malaise/fatigue, dyslipidemia and previous history of low testosterone level.    Patient continues to have malaise/fatigue, lack of generalized stamina.  Denies any focal chest pain, syncope, palpitations or vertigo.  No significant change to frequency or severity of migraine headaches.  Denies fever, chills or night sweats.  Maintains balanced dietary intake, good hydration habits.  Maintains good urine output without hematuria.  Denies any BRB/BTS.  No epistaxis or hemoptysis.  He does report pruritic patchy erythematous areas to the torso.    He does report some overly repetitive activities at work and at home recently, resulting in discomfort to the ulnar crease of the right wrist.    Subjective     The following portions of the patient's history were reviewed and updated as appropriate: allergies, current medications, past family history, past medical history, past social history, past surgical history and problem list.    Allergies   Allergen Reactions    Fenofibrate Rash    Atorvastatin Myalgia    Ibuprofen GI Intolerance    Sulfa Antibiotics Hives    Tramadol Hives    Zoloft [Sertraline] Irritability       Review of Systems  Constitutional: Negative for fever. Negative for chills, diaphoresis; malaise/fatigue as per above.  HENT: No dysphagia; no changes to vision/hearing/smell/taste; no epistaxis  Eyes: Negative for redness and visual disturbance.   Respiratory: negative for shortness of breath. Negative for chest pain . Negative for cough and chest tightness.   Cardiovascular: Negative for chest pain and palpitations.  "  Gastrointestinal: As per above.  Endocrine: Negative for cold intolerance and heat intolerance.   Genitourinary: Negative for difficulty urinating, dysuria and frequency.   Musculoskeletal: Paroxysmal spastic torticollis.  Diffuse arthralgias and myalgias.  Discomfort to the ulnar aspect of the right wrist, ulnar deviation of hand worsens discomfort.  Skin: As per above.  Neurological: Negative for syncope, weakness; chronic migraine headache disorder.  Hematological: Negative for adenopathy. Does not bruise/bleed easily.   Psychiatric/Behavioral: Negative for confusion. The patient is not nervous/anxious.    Objective     Physical Exam   Vital Signs: /90   Pulse 89   Temp 98 °F (36.7 °C) (Axillary)   Resp 16   Ht 167.6 cm (66\")   Wt 81.7 kg (180 lb 3.2 oz)   SpO2 98%   BMI 29.09 kg/m²   BMI is >= 30 and <35. (Class 1 Obesity). The following options were offered after discussion;: exercise counseling/recommendations and nutrition counseling/recommendations  Patient's (Body mass index is 29.09 kg/m².) indicates that they are obese (BMI >30) with health related conditions that include hypertension, dyslipidemias, and GERD . Weight is improving with lifestyle modifications. BMI is is above average; BMI management plan is completed. We discussed low calorie, low carb based diet program, portion control, increasing exercise, joining a fitness center or start home based exercise program, and Weight Watchers or other Commercial based weight reduction program.      General Appearance: alert, oriented x 3, no acute distress.  Skin: warm and dry.  Nummular erythematous areas to the upper extremities consistent with tinea.  Signs of excoriation noted.  HEENT: Atraumatic.  pupils round and reactive to light and accommodation, oral mucosa pink and moist.  Nares patent without epistaxis.  External auditory canals are patent tympanic membranes intact.  Neck: supple, no JVD, trachea midline.  No thyromegaly.  There " is spasticity noted to the paravertebral musculature of the cervical spine, limiting all aspects of ROM testing.  Lungs: CTA, unlabored breathing effort.  Heart: RRR, normal S1 and S2, no S3, no rub.  Abdomen: soft, non-tender, no palpable bladder, present bowel sounds to auscultation ×4.  No guarding or rigidity.  No CVA tenderness.  Extremities: no clubbing, cyanosis or edema.  Good range of motion actively and passively.  Symmetric muscle strength and development.  Mild tenderness on palpation of right ulnar crease.  Without any findings of joint instability.  No palmar atrophy noted.  Neuro: normal speech and mental status.  Cranial nerves II through XII intact.  No anosmia. DTR 2+; proprioception intact.  No focal motor/sensory deficits.        Assessment and Plan      Assessment/Plan:   Diagnoses and all orders for this visit:    1. Low testosterone in male (Primary)  -     Testosterone (Free & Total), LC / MS  -     Prolactin  -     Sex Horm Binding Globulin    2. Tinea corporis  -     clotrimazole-betamethasone (LOTRISONE) 1-0.05 % cream; Apply  topically to the appropriate area as directed See Admin Instructions. Apply topically to the affected area twice daily  Dispense: 45 g; Refill: 0    3. Panic disorder with agoraphobia, mild agoraphobic avoidance and moderate panic attacks  -     diazePAM (VALIUM) 2 MG tablet; Take 1.5 tablets by mouth Every 12 (Twelve) Hours As Needed for Anxiety.  Dispense: 60 tablet; Refill: 2  -     venlafaxine XR (EFFEXOR-XR) 75 MG 24 hr capsule; Take 1 capsule by mouth Daily.  Dispense: 90 capsule; Refill: 3    4. Chronic allergic rhinitis  -     fluticasone (FLONASE) 50 MCG/ACT nasal spray; Administer 2 sprays into the nostril(s) as directed by provider Daily.  Dispense: 18.2 mL; Refill: 2  -     levocetirizine (XYZAL) 5 MG tablet; Take 1 tablet by mouth Every Evening.  Dispense: 90 tablet; Refill: 3    5. Essential hypertension  -     aspirin 81 MG EC tablet; Take 1 tablet by  mouth Daily.  Dispense: 90 tablet; Refill: 3  -     lisinopril (PRINIVIL,ZESTRIL) 10 MG tablet; Take 1 tablet by mouth Daily.  Dispense: 90 tablet; Refill: 3    6. GERD without esophagitis  -     pantoprazole (PROTONIX) 40 MG EC tablet; Take 1 tablet by mouth Daily.  Dispense: 90 tablet; Refill: 3    7. Migraine with aura and without status migrainosus, not intractable  -     Rimegepant Sulfate (Nurtec) 75 MG tablet dispersible tablet; Take 1 tablet by mouth 1 (One) Time As Needed (migraine).  Dispense: 10 tablet; Refill: 3  -     venlafaxine XR (EFFEXOR-XR) 75 MG 24 hr capsule; Take 1 capsule by mouth Daily.  Dispense: 90 capsule; Refill: 3    8. Dyslipidemia  -     rosuvastatin (CRESTOR) 10 MG tablet; Take 1 tablet by mouth Every Night.  Dispense: 90 tablet; Refill: 3    9. Malaise and fatigue  -     Testosterone (Free & Total), LC / MS  -     Prolactin  -     Sex Horm Binding Globulin    Other orders  -     albuterol sulfate  (90 Base) MCG/ACT inhaler; Inhale 2 puffs 4 (Four) Times a Day.  Dispense: 18 g; Refill: 1  -     butalbital-acetaminophen-caffeine (Esgic) -40 MG per tablet; Take 1 tablet by mouth Every 4 (Four) Hours As Needed for Headache or Migraine.  Dispense: 20 tablet; Refill: 0  -     Umeclidinium-Vilanterol (ANORO ELLIPTA) 62.5-25 MCG/ACT aerosol powder  inhaler; Inhale 1 puff Daily.  Dispense: 28 each; Refill: 5  -     vitamin C (ASCORBIC ACID) 250 MG tablet; Take 1 tablet by mouth Daily.  Dispense: 90 tablet; Refill: 3  -     vitamin D3 125 MCG (5000 UT) capsule capsule; Take 1 capsule by mouth Daily.  Dispense: 30 capsule; Refill: 3      Inc dose of Venlafaxine to 150 mg.  Discussed need for stress/anxiety reducing techniques such as prayer/meditation/breathing and counting exercises and avoidance of stress producing environments/situations; will follow clinically.    Brace for right ulnar compression/TFCC sprain from overly repetitive activity of dominant hand.  Utilize ice  compress therapy to the affected area as needed.    Continue statin therapy.      Vital signs demonstrate hemodynamic stability, although diastolic blood pressure is not at goal.  I have asked that he continue to monitor his blood pressure routinely at home, maintain appropriate hydration status.  Discussed need for reduction in sodium/salt/caffeine intake; improve sleep habits as able; inc formal CV exercise program with goal of vigorous activity most, if not all, days of the week; goal of 150 min of sustained HR CV exercise total per week.      Discussion Summary:    Discussed plan of care in detail with pt today; pt verb understanding and agrees.    I spent 35 minutes caring for Ronnie on this date of service. This time includes time spent by me in the following activities:preparing for the visit, performing a medically appropriate examination and/or evaluation , counseling and educating the patient/family/caregiver, ordering medications, tests, or procedures, documenting information in the medical record, and care coordination    I confirm accuracy of unchanged data/findings which have been carried forward from previous visit, as well as I have updated appropriately those that have changed.      Follow up:  Return in about 4 weeks (around 11/12/2024) for Recheck.     There are no Patient Instructions on file for this visit.    Kentrell Saravia,   10/15/24  17:39 EDT

## 2024-10-26 ENCOUNTER — LAB (OUTPATIENT)
Dept: LAB | Facility: HOSPITAL | Age: 44
End: 2024-10-26
Payer: COMMERCIAL

## 2024-10-26 LAB — PROLACTIN SERPL-MCNC: 13.8 NG/ML (ref 4.04–15.2)

## 2024-10-26 PROCEDURE — 84402 ASSAY OF FREE TESTOSTERONE: CPT | Performed by: FAMILY MEDICINE

## 2024-10-26 PROCEDURE — 84270 ASSAY OF SEX HORMONE GLOBUL: CPT | Performed by: FAMILY MEDICINE

## 2024-10-26 PROCEDURE — 84403 ASSAY OF TOTAL TESTOSTERONE: CPT | Performed by: FAMILY MEDICINE

## 2024-10-26 PROCEDURE — 36415 COLL VENOUS BLD VENIPUNCTURE: CPT | Performed by: FAMILY MEDICINE

## 2024-10-26 PROCEDURE — 84146 ASSAY OF PROLACTIN: CPT | Performed by: FAMILY MEDICINE

## 2024-10-27 LAB — SHBG SERPL-SCNC: 16.6 NMOL/L (ref 16.5–55.9)

## 2024-11-02 LAB
TESTOST FREE SERPL-MCNC: 9.5 PG/ML (ref 6.8–21.5)
TESTOST SERPL-MCNC: 246.8 NG/DL (ref 264–916)

## 2024-11-26 ENCOUNTER — OFFICE VISIT (OUTPATIENT)
Dept: FAMILY MEDICINE CLINIC | Facility: CLINIC | Age: 44
End: 2024-11-26
Payer: COMMERCIAL

## 2024-11-26 VITALS
WEIGHT: 176 LBS | HEIGHT: 66 IN | OXYGEN SATURATION: 98 % | SYSTOLIC BLOOD PRESSURE: 130 MMHG | HEART RATE: 78 BPM | BODY MASS INDEX: 28.28 KG/M2 | DIASTOLIC BLOOD PRESSURE: 92 MMHG

## 2024-11-26 DIAGNOSIS — E78.5 DYSLIPIDEMIA: ICD-10-CM

## 2024-11-26 DIAGNOSIS — N52.9 VASCULOGENIC ERECTILE DYSFUNCTION, UNSPECIFIED VASCULOGENIC ERECTILE DYSFUNCTION TYPE: ICD-10-CM

## 2024-11-26 DIAGNOSIS — R68.82 DECREASED LIBIDO: ICD-10-CM

## 2024-11-26 DIAGNOSIS — I10 ESSENTIAL HYPERTENSION: ICD-10-CM

## 2024-11-26 DIAGNOSIS — J01.00 ACUTE MAXILLARY SINUSITIS, RECURRENCE NOT SPECIFIED: ICD-10-CM

## 2024-11-26 DIAGNOSIS — J45.41 MODERATE PERSISTENT REACTIVE AIRWAY DISEASE WITH ACUTE EXACERBATION: ICD-10-CM

## 2024-11-26 DIAGNOSIS — G43.109 MIGRAINE WITH AURA AND WITHOUT STATUS MIGRAINOSUS, NOT INTRACTABLE: Primary | ICD-10-CM

## 2024-11-26 DIAGNOSIS — R53.81 MALAISE AND FATIGUE: ICD-10-CM

## 2024-11-26 DIAGNOSIS — F17.200 TOBACCO DEPENDENCE: ICD-10-CM

## 2024-11-26 DIAGNOSIS — K21.9 GERD WITHOUT ESOPHAGITIS: ICD-10-CM

## 2024-11-26 DIAGNOSIS — R53.83 MALAISE AND FATIGUE: ICD-10-CM

## 2024-11-26 DIAGNOSIS — E29.1 TESTOSTERONE DEFICIENCY IN MALE: ICD-10-CM

## 2024-11-26 PROCEDURE — 99214 OFFICE O/P EST MOD 30 MIN: CPT | Performed by: FAMILY MEDICINE

## 2024-11-26 RX ORDER — DOXYCYCLINE 100 MG/1
1 CAPSULE ORAL EVERY 12 HOURS SCHEDULED
COMMUNITY
Start: 2024-11-23 | End: 2024-11-26 | Stop reason: ALTCHOICE

## 2024-11-26 RX ORDER — DEXAMETHASONE 0.5 MG/1
TABLET ORAL
Qty: 21 TABLET | Refills: 0 | Status: SHIPPED | OUTPATIENT
Start: 2024-11-26

## 2024-11-26 RX ORDER — CEFDINIR 300 MG/1
300 CAPSULE ORAL 2 TIMES DAILY
Qty: 10 CAPSULE | Refills: 0 | Status: SHIPPED | OUTPATIENT
Start: 2024-11-26 | End: 2024-12-01

## 2024-11-26 NOTE — PROGRESS NOTES
Established Patient        Chief Complaint:   Chief Complaint   Patient presents with   • Follow-up     Low Testosterone        Ronnie Reveles is a 44 y.o. male    History of Present Illness:   Here today in scheduled follow-up visit of his migraine headache disorder, reactive airway disease, hypertension, GERD, dyslipidemia and low testosterone.  Continues to deal with decreased libido, as well as erectile dysfunction.  Noted recent labs demonstrate clinically significant low testosterone.    Patient reports a recurrence of his maxillary sinusitis symptoms, frontal facial pressure and heavy nasal congestion, copious amounts of green/yellow nasal discharge.  Postnasal drainage results and upper airway congestion, as well as occasional wheezing.  Denies aspiration.  No known sick contacts    Denies chest pain, syncope, palpitations or vertigo.  Denies fever, chills or night sweats.  Maintains an active lifestyle, balanced dietary intake; reports good hydration habits.  Denies hematuria/dysuria.  Denies any BRB/BTS.  No new rashes.  Denies orthopnea, epistaxis or hemoptysis.    Subjective     The following portions of the patient's history were reviewed and updated as appropriate: allergies, current medications, past family history, past medical history, past social history, past surgical history and problem list.    Allergies   Allergen Reactions   • Fenofibrate Rash   • Atorvastatin Myalgia   • Ibuprofen GI Intolerance   • Sulfa Antibiotics Hives   • Tramadol Hives   • Zoloft [Sertraline] Irritability       Review of Systems  Constitutional: Negative for fever. Negative for chills, diaphoresis; malaise/fatigue as per above.  HENT: No dysphagia; no changes to vision/hearing/smell/taste; no epistaxis.  Otherwise, as per above.  Eyes: Negative for redness and visual disturbance.   Respiratory: negative for shortness of breath. Negative for chest pain.  Cough productive of clear/yellow phlegm at  "times.  No hemoptysis.  Cardiovascular: Negative for chest pain and palpitations.   Gastrointestinal: Denies BRB/BTS.  Endocrine: Negative for cold intolerance and heat intolerance.   Genitourinary: Negative for difficulty urinating, dysuria and frequency.  Severely decreased libido.  Musculoskeletal: Paroxysmal spastic torticollis.  Diffuse arthralgias and myalgias.  Discomfort to the ulnar aspect of the right wrist, ulnar deviation of hand worsens discomfort.  Skin: No new rashes.  Neurological: Negative for syncope, weakness; chronic migraine headache disorder.  Hematological: Negative for adenopathy. Does not bruise/bleed easily.   Psychiatric/Behavioral: Negative for confusion. The patient is not nervous/anxious.    Objective     Physical Exam   Vital Signs: /92   Pulse 78   Ht 167.6 cm (66\")   Wt 79.8 kg (176 lb)   SpO2 98%   BMI 28.41 kg/m²   BMI is >= 30 and <35. (Class 1 Obesity). The following options were offered after discussion;: exercise counseling/recommendations and nutrition counseling/recommendations  Patient's (Body mass index is 28.41 kg/m².) indicates that they are obese (BMI >30) with health related conditions that include hypertension, dyslipidemias, and GERD . Weight is improving with lifestyle modifications. BMI is is above average; BMI management plan is completed. We discussed low calorie, low carb based diet program, portion control, increasing exercise, joining a fitness center or start home based exercise program, and Weight Watchers or other Commercial based weight reduction program.      General Appearance: alert, oriented x 3, no acute distress.  Skin: warm and dry.    HEENT: Atraumatic.  pupils round and reactive to light and accommodation, oral mucosa pink and moist.  Nares patent without epistaxis.  External auditory canals are patent tympanic membranes intact.  Neck: supple, no JVD, trachea midline.  No thyromegaly.  There is spasticity noted to the paravertebral " musculature of the cervical spine, limiting all aspects of ROM testing.  Lungs: Rhonchus, referred, upper airway congestion is cleared with light cough, unlabored breathing effort.  Heart: RRR, normal S1 and S2, no S3, no rub.  Abdomen: soft, non-tender, no palpable bladder, present bowel sounds to auscultation ×4.  No guarding or rigidity.  No CVA tenderness.  Extremities: no clubbing, cyanosis or edema.  Good range of motion actively and passively.  Symmetric muscle strength and development.  Mild tenderness on palpation of right ulnar crease.  Without any findings of joint instability.  No palmar atrophy noted.  Neuro: normal speech and mental status.  Cranial nerves II through XII intact.  No anosmia. DTR 2+; proprioception intact.  No focal motor/sensory deficits.    Advance Care Planning   ACP discussion was held with the patient during this visit. Patient does not have an advance directive, information provided.       Assessment and Plan      Assessment/Plan:   Diagnoses and all orders for this visit:    1. Migraine with aura and without status migrainosus, not intractable (Primary)    2. Acute maxillary sinusitis, recurrence not specified  -     cefdinir (OMNICEF) 300 MG capsule; Take 1 capsule by mouth 2 (Two) Times a Day for 5 days.  Dispense: 10 capsule; Refill: 0  -     dexAMETHasone (DECADRON) 0.5 MG tablet; 6 po x1d; then 5 po x 1d; then 4 po x 1d; then 3 po x 1d; then 2 po x 1d; then 1 po x1d; then STOP  Dispense: 21 tablet; Refill: 0    3. Tobacco dependence    4. Moderate persistent reactive airway disease with acute exacerbation  -     dexAMETHasone (DECADRON) 0.5 MG tablet; 6 po x1d; then 5 po x 1d; then 4 po x 1d; then 3 po x 1d; then 2 po x 1d; then 1 po x1d; then STOP  Dispense: 21 tablet; Refill: 0    5. Testosterone deficiency in male  -     Ambulatory Referral to Urology    6. Vasculogenic erectile dysfunction, unspecified vasculogenic erectile dysfunction type    7. Essential  hypertension    8. GERD without esophagitis    9. Dyslipidemia    10. Decreased libido  -     Ambulatory Referral to Urology    11. Malaise and fatigue  -     Ambulatory Referral to Urology      Referral to urology for evaluation/tx options of symptomatic Testosterone deficiency.    Vital signs demonstrate hemodynamic debility, however diastolic blood pressure remains above goal.  Advised that he continue to monitor his blood pressure routinely at home.  Keep a log of those results, bring with him to his next visit.  Discussed need for reduction in sodium/salt/caffeine intake; improve sleep habits as able; inc formal CV exercise program with goal of vigorous activity most, if not all, days of the week; goal of 150 min of sustained HR CV exercise total per week.    Plan treatment of recurrent maxillary sinusitis with a course of cefdinir, as well as a 6-day low-dose dexamethasone taper.  I have asked that he notify the office should he develop any ill effects of medications, or if his symptoms fail to improve.  Dexamethasone should aid in relieving his reactive airway exacerbation as a result of the sinus infection.      Continue avoidance of known migraine headache triggers.    Anti - reflux measures, trigger foods and drinks to avoid: Fatty foods, alcohol, chocolate, coffee, tea, caffeinated soft drinks (decaffeinated coffee still has some caffeine), peppermint and spearmint, spices and vinegar, citrus fruits and juices, tomatoes and tomato sauces, and smoking. Other antireflux measures include weight reduction if overweight, avoid tight clothing around the abdomen, elevate the head of her bed 6 inches (May use a bed wedge which is placed between the mattress in box [place]) or blocks under the head of the bed, don't drink or eat for 2 hours before going to bed and avoid lying down immediately after meals.      Discussion Summary:    Discussed plan of care in detail with pt today; pt verb understanding and  agrees.    I spent 35 minutes caring for Ronnie on this date of service. This time includes time spent by me in the following activities:preparing for the visit, performing a medically appropriate examination and/or evaluation , counseling and educating the patient/family/caregiver, ordering medications, tests, or procedures, referring and communicating with other health care professionals , documenting information in the medical record, and care coordination    I confirm accuracy of unchanged data/findings which have been carried forward from previous visit, as well as I have updated appropriately those that have changed.      Follow up:  Return in about 4 months (around 3/26/2025).     There are no Patient Instructions on file for this visit.    Kentrell Saravia,   12/12/24  15:59 EST

## 2024-12-05 ENCOUNTER — OFFICE VISIT (OUTPATIENT)
Dept: GASTROENTEROLOGY | Facility: CLINIC | Age: 44
End: 2024-12-05
Payer: COMMERCIAL

## 2024-12-05 VITALS
DIASTOLIC BLOOD PRESSURE: 90 MMHG | OXYGEN SATURATION: 90 % | WEIGHT: 177 LBS | HEART RATE: 77 BPM | SYSTOLIC BLOOD PRESSURE: 128 MMHG | BODY MASS INDEX: 28.57 KG/M2

## 2024-12-05 DIAGNOSIS — K76.0 METABOLIC DYSFUNCTION-ASSOCIATED STEATOTIC LIVER DISEASE (MASLD): ICD-10-CM

## 2024-12-05 DIAGNOSIS — Z12.11 ENCOUNTER FOR COLORECTAL CANCER SCREENING: ICD-10-CM

## 2024-12-05 DIAGNOSIS — Z12.12 ENCOUNTER FOR COLORECTAL CANCER SCREENING: ICD-10-CM

## 2024-12-05 DIAGNOSIS — R74.01 ELEVATED AST (SGOT): ICD-10-CM

## 2024-12-05 DIAGNOSIS — K21.9 GASTROESOPHAGEAL REFLUX DISEASE, UNSPECIFIED WHETHER ESOPHAGITIS PRESENT: Primary | ICD-10-CM

## 2024-12-05 DIAGNOSIS — R16.0 HEPATOMEGALY: ICD-10-CM

## 2024-12-05 RX ORDER — SODIUM, POTASSIUM,MAG SULFATES 17.5-3.13G
SOLUTION, RECONSTITUTED, ORAL ORAL
Qty: 354 ML | Refills: 0 | Status: SHIPPED | OUTPATIENT
Start: 2024-12-05

## 2024-12-05 RX ORDER — SODIUM CHLORIDE 9 MG/ML
30 INJECTION, SOLUTION INTRAVENOUS CONTINUOUS PRN
OUTPATIENT
Start: 2024-12-05 | End: 2024-12-06

## 2024-12-05 NOTE — PROGRESS NOTES
New Patient Consult      Date: 2024   Patient Name: Ronnie Reveles  MRN: [id number]  : 1980     Primary Care Provider: Kentrell Saravia DO  Referring Provider: Lan    Chief Complaint   Patient presents with    Colon Cancer Screening    Hepatic Disease     History of Present Illness: Ronnie Reveles is a 44 y.o. male who is here today as a consultation with Gastroenterology for evaluation of Colon Cancer Screening and Hepatic Disease.     Has lost weight intentionally since he found out about having fatty liver, has lost about 30 lbs in the past couple of months. No alcohol use or alcoholic history. Has a history of HLD but no diabetes.     Last colonoscopy was 8-10 years ago, told normal, completely at [facility]. Having regular daily stools. No rectal bleeding. No abdominal pain.     Taking Protonix 40 mg once daily still sometimes with symptoms, takes mylanta PRN. Wakes up with acid into his throat at times. Had an EGD back in , not sure of those results.     Subjective      Past Medical History:   Diagnosis Date    Abnormal finding of lung     Emphysema, unspecified     DAKOTA (generalized anxiety disorder)     Hyperlipidemia     Hypertension     MDD (major depressive disorder)     Migraine     Obesity     Tobacco abuse      Past Surgical History:   Procedure Laterality Date    APPENDECTOMY      CHOLECYSTECTOMY       Family History   Problem Relation Age of Onset    Arthritis Mother     Migraine headaches Mother      Social History     Socioeconomic History    Marital status:    Tobacco Use    Smoking status: Every Day     Current packs/day: 1.00     Average packs/day: 1 pack/day for 40.9 years (40.9 ttl pk-yrs)     Types: Cigarettes     Start date: 1984     Passive exposure: Current    Smokeless tobacco: Never   Vaping Use    Vaping status: Never Used   Substance and Sexual Activity    Alcohol use: Never    Drug use: Never    Sexual activity: Yes     Current Outpatient  Medications:     albuterol sulfate  (90 Base) MCG/ACT inhaler, Inhale 2 puffs 4 (Four) Times a Day., Disp: 18 g, Rfl: 1    aspirin 81 MG EC tablet, Take 1 tablet by mouth Daily., Disp: 90 tablet, Rfl: 3    butalbital-acetaminophen-caffeine (Esgic) -40 MG per tablet, Take 1 tablet by mouth Every 4 (Four) Hours As Needed for Headache or Migraine., Disp: 20 tablet, Rfl: 0    clotrimazole-betamethasone (LOTRISONE) 1-0.05 % cream, Apply  topically to the appropriate area as directed See Admin Instructions. Apply topically to the affected area twice daily, Disp: 45 g, Rfl: 0    dexAMETHasone (DECADRON) 0.5 MG tablet, 6 po x1d; then 5 po x 1d; then 4 po x 1d; then 3 po x 1d; then 2 po x 1d; then 1 po x1d; then STOP, Disp: 21 tablet, Rfl: 0    diazePAM (VALIUM) 2 MG tablet, Take 1.5 tablets by mouth Every 12 (Twelve) Hours As Needed for Anxiety., Disp: 60 tablet, Rfl: 2    fluticasone (FLONASE) 50 MCG/ACT nasal spray, Administer 2 sprays into the nostril(s) as directed by provider Daily., Disp: 18.2 mL, Rfl: 2    levocetirizine (XYZAL) 5 MG tablet, Take 1 tablet by mouth Every Evening., Disp: 90 tablet, Rfl: 3    lisinopril (PRINIVIL,ZESTRIL) 10 MG tablet, Take 1 tablet by mouth Daily., Disp: 90 tablet, Rfl: 3    pantoprazole (PROTONIX) 40 MG EC tablet, Take 1 tablet by mouth Daily., Disp: 90 tablet, Rfl: 3    Rimegepant Sulfate (Nurtec) 75 MG tablet dispersible tablet, Take 1 tablet by mouth 1 (One) Time As Needed (migraine)., Disp: 10 tablet, Rfl: 3    rosuvastatin (CRESTOR) 10 MG tablet, Take 1 tablet by mouth Every Night., Disp: 90 tablet, Rfl: 3    Umeclidinium-Vilanterol (ANORO ELLIPTA) 62.5-25 MCG/ACT aerosol powder  inhaler, Inhale 1 puff Daily., Disp: 28 each, Rfl: 5    venlafaxine XR (EFFEXOR-XR) 150 MG 24 hr capsule, Take 1 capsule by mouth Daily., Disp: 90 capsule, Rfl: 2    vitamin C (ASCORBIC ACID) 250 MG tablet, Take 1 tablet by mouth Daily., Disp: 90 tablet, Rfl: 3    vitamin D3 125 MCG (5000  UT) capsule capsule, Take 1 capsule by mouth Daily., Disp: 30 capsule, Rfl: 3    Allergies   Allergen Reactions    Fenofibrate Rash    Atorvastatin Myalgia    Ibuprofen GI Intolerance    Sulfa Antibiotics Hives    Tramadol Hives    Zoloft [Sertraline] Irritability     The following portions of the patient's history were reviewed and updated as appropriate: allergies, current medications, past family history, past medical history, past social history, past surgical history and problem list.    Objective     Physical Exam  Vitals reviewed.   Constitutional:       General: He is not in acute distress.     Appearance: Normal appearance. He is well-developed. He is not ill-appearing or diaphoretic.   HENT:      Head: Normocephalic and atraumatic.      Right Ear: External ear normal.      Left Ear: External ear normal.      Nose: Nose normal.   Eyes:      General: No scleral icterus.        Right eye: No discharge.         Left eye: No discharge.      Conjunctiva/sclera: Conjunctivae normal.   Neck:      Vascular: No JVD.   Cardiovascular:      Rate and Rhythm: Normal rate and regular rhythm.      Heart sounds: Normal heart sounds. No murmur heard.     No friction rub. No gallop.   Pulmonary:      Effort: Pulmonary effort is normal. No respiratory distress.      Breath sounds: Normal breath sounds. No wheezing or rales.   Chest:      Chest wall: No tenderness.   Abdominal:      General: Bowel sounds are normal. There is no distension.      Palpations: Abdomen is soft. There is no mass.      Tenderness: There is abdominal tenderness (epigastric and periumbilical, mild). There is no guarding.   Musculoskeletal:         General: No deformity. Normal range of motion.      Cervical back: Normal range of motion.   Skin:     General: Skin is warm and dry.      Findings: No erythema or rash.   Neurological:      Mental Status: He is alert and oriented to person, place, and time.      Coordination: Coordination normal.    Psychiatric:         Mood and Affect: Mood normal.         Behavior: Behavior normal.         Thought Content: Thought content normal.         Judgment: Judgment normal.       Vitals:    12/05/24 0950   BP: 128/90   Pulse: 77   SpO2: 90%   Weight: 80.3 kg (177 lb)     Results Review:   I have reviewed the patient's new clinical and imaging results.    Comprehensive Metabolic Panel (08/24/2024 09:48)  CBC Auto Differential (08/24/2024 09:48)    US abd 7/2023  FINDINGS:   The pancreas is obscured by bowel gas. The liver parenchyma  is of increased echogenicity and liver is enlarged at 19.5 cm. This is  consistent with fatty infiltration. Portal vein is normal in size with  normal direction of flow. The gall bladder is surgically absent. The  common duct 5 mm. The right kidney measures 11.2 cm in length and is  normal in echogenicity without hydronephrosis. The left kidney measures  9.9 cm in length and is normal in echogenicity without hydronephrosis.  Spleen is normal in size and contains several calcified granulomas. The  aorta is normal in caliber. The vena cava is unremarkable.  IMPRESSION:  Enlarged, fatty infiltrated liver and a postcholecystectomy  patient.    Assessment / Plan      1. Gastroesophageal reflux disease, unspecified whether esophagitis present  Has long history of severe GERD symptoms. Taking Protonix 40 mg once daily still sometimes with symptoms, takes mylanta PRN. Wakes up with acid into his throat at times. Had an EGD back in 2022, not sure of those results.     Acid reflux measures  Continue PPI daily   Elevate head of bed  EGD at the time of his colonoscopy     He will have an esophagogastroduodenoscopy and colonoscopy performed with monitored anesthesia care. The indications, technique, alternatives and potential risk and complications were discussed with the patient including but not limited to bleeding, bowel perforations, missing lesions and anesthetic complications. The patient  understands and wishes to proceed with the procedure and has given their verbal consent. Written patient education information was given to the patient. He should follow up in the office after this procedure to discuss the results and further recommendations can be made at that time. The patient will call if they have further questions before procedure.  - Case Request  - sodium-potassium-magnesium sulfates (SUPREP) 17.5-3.13-1.6 GM/177ML solution oral solution; Take 2 bottles by mouth 1 (One) Time for 1 dose. Please see prep instructions from office  Dispense: 354 mL; Refill: 0    2. Elevated AST (SGOT)  3. Hepatomegaly  4. Metabolic dysfunction-associated steatotic liver disease (MASLD)  New diagnosis of fatty liver. US abd 7/2023 showed hepatomegaly and fatty liver. Last labs 8/2024 AST 41, ALT 38, bili 0.5, alk phos 85. Nonalcoholic. BMI now 28 and improved from a couple of months ago. Has lost about 30 lbs in the past couple of months. Has a history of HLD but no diabetes.     Weight loss goal to lose 10-15 lbs in the next 6 months  Mediterranean diet recommended  Fasting lab at next PCP appt    - GATES Fibrosure Plus; Future    5. Encounter for colorectal cancer screening  Last colonoscopy was 8-10 years ago, told normal, completely at [facility]. Having regular daily stools. No family history of colon cancer.     Will arrange screening colonoscopy now      Follow Up:   Return for follow up after procedure to discuss results.    Sammie Gonzalez PA-C  [facility]  12/5/2024  16:48 EST    Dictated Utilizing Dragon Dictation: Part of this note may be an electronic transcription/translation of spoken language to printed text using the Dragon Dictation System.

## 2024-12-05 NOTE — PATIENT INSTRUCTIONS
Weight loss goal to lose 10-15 lbs in the next 6 months  Elevate head of bed  Fasting lab at next PCP appt

## 2024-12-12 PROBLEM — R68.82 DECREASED LIBIDO: Status: ACTIVE | Noted: 2024-12-12

## 2024-12-16 ENCOUNTER — TELEPHONE (OUTPATIENT)
Age: 44
End: 2024-12-16

## 2024-12-16 NOTE — TELEPHONE ENCOUNTER
Caller: CHAYITO CAMARENA    Relationship: Emergency Contact    Best call back number: [phone]     What medication are you requesting: ANTIBIOTICS AND STEROIDS    What are your current symptoms: ONGOING HEADACHE, CONGESTION, DRAINAGE, SINUS PRESSURE    How long have you been experiencing symptoms: 3 WEEKS    Have you had these symptoms before:    [x] Yes  [] No    Have you been treated for these symptoms before:   [x] Yes  [] No    If a prescription is needed, what is your preferred pharmacy and phone number: [address] [phone] [facility] [phone]      Additional notes:  PATIENT COMPLETED FIRST ROUND OF MEDICATIONS BUT SYMPTOMS STILL PERSIST.

## 2025-01-15 ENCOUNTER — ANESTHESIA (OUTPATIENT)
Dept: GASTROENTEROLOGY | Facility: HOSPITAL | Age: 45
End: 2025-01-15
Payer: COMMERCIAL

## 2025-01-15 ENCOUNTER — ANESTHESIA EVENT (OUTPATIENT)
Dept: GASTROENTEROLOGY | Facility: HOSPITAL | Age: 45
End: 2025-01-15
Payer: COMMERCIAL

## 2025-01-15 ENCOUNTER — HOSPITAL ENCOUNTER (OUTPATIENT)
Facility: HOSPITAL | Age: 45
Setting detail: HOSPITAL OUTPATIENT SURGERY
Discharge: HOME | End: 2025-01-15
Attending: INTERNAL MEDICINE | Admitting: INTERNAL MEDICINE
Payer: COMMERCIAL

## 2025-01-15 VITALS
DIASTOLIC BLOOD PRESSURE: 93 MMHG | TEMPERATURE: 97.7 F | WEIGHT: 177 LBS | RESPIRATION RATE: 16 BRPM | BODY MASS INDEX: 28.57 KG/M2 | HEART RATE: 90 BPM | OXYGEN SATURATION: 95 % | SYSTOLIC BLOOD PRESSURE: 127 MMHG

## 2025-01-15 DIAGNOSIS — Z12.12 ENCOUNTER FOR COLORECTAL CANCER SCREENING: ICD-10-CM

## 2025-01-15 DIAGNOSIS — K21.9 GASTROESOPHAGEAL REFLUX DISEASE, UNSPECIFIED WHETHER ESOPHAGITIS PRESENT: ICD-10-CM

## 2025-01-15 DIAGNOSIS — Z12.11 ENCOUNTER FOR COLORECTAL CANCER SCREENING: ICD-10-CM

## 2025-01-15 PROCEDURE — 43239 EGD BIOPSY SINGLE/MULTIPLE: CPT | Performed by: INTERNAL MEDICINE

## 2025-01-15 PROCEDURE — 45380 COLONOSCOPY AND BIOPSY: CPT | Performed by: INTERNAL MEDICINE

## 2025-01-15 PROCEDURE — 25010000002 PROPOFOL 10 MG/ML EMULSION: Performed by: NURSE ANESTHETIST, CERTIFIED REGISTERED

## 2025-01-15 PROCEDURE — 25010000002 LIDOCAINE (CARDIAC): Performed by: NURSE ANESTHETIST, CERTIFIED REGISTERED

## 2025-01-15 RX ORDER — SIMETHICONE 40MG/0.6ML
SUSPENSION, DROPS(FINAL DOSAGE FORM)(ML) ORAL AS NEEDED
Status: DISCONTINUED | OUTPATIENT
Start: 2025-01-15 | End: 2025-01-15 | Stop reason: HOSPADM

## 2025-01-15 RX ORDER — PROPOFOL 10 MG/ML
VIAL (ML) INTRAVENOUS CONTINUOUS PRN
Status: DISCONTINUED | OUTPATIENT
Start: 2025-01-15 | End: 2025-01-15 | Stop reason: SURG

## 2025-01-15 RX ORDER — SODIUM CHLORIDE 9 MG/ML
30 INJECTION, SOLUTION INTRAVENOUS CONTINUOUS PRN
Status: DISCONTINUED | OUTPATIENT
Start: 2025-01-15 | End: 2025-01-15 | Stop reason: HOSPADM

## 2025-01-15 RX ADMIN — LIDOCAINE HYDROCHLORIDE 60 MG: 20 INJECTION, SOLUTION INTRAVENOUS at 11:42

## 2025-01-15 RX ADMIN — PROPOFOL 200 MCG/KG/MIN: 10 INJECTION, EMULSION INTRAVENOUS at 11:42

## 2025-01-15 NOTE — ANESTHESIA POSTPROCEDURE EVALUATION
Patient: Ronnie Reveles    Procedure Summary       Date: 01/15/25 Room / Location: [facility] ENDOSCOPY 2 / [facility] ENDOSCOPY    Anesthesia Start: 1140 Anesthesia Stop: 1210    Procedures:       Esophagogastroduodenoscopy with biopsies      Colonoscopy with cold biopsy polypectomy CPT CODE:  Diagnosis:       Gastroesophageal reflux disease, unspecified whether esophagitis present      Encounter for colorectal cancer screening      (Gastroesophageal reflux disease, unspecified whether esophagitis present [K21.9])      (Encounter for colorectal cancer screening [Z12.11, Z12.12])    Surgeons: Yves Gee MD Provider: Dimitri Malhotra CRNA    Anesthesia Type: MAC ASA Status: 2            Anesthesia Type: MAC    Vitals  No vitals data found for the desired time range.          Post Anesthesia Care and Evaluation    Patient location during evaluation: PHASE II  Patient participation: complete - patient participated  Level of consciousness: awake and alert  Pain score: 0  Pain management: satisfactory to patient    Airway patency: patent  Anesthetic complications: No anesthetic complications  PONV Status: none  Cardiovascular status: acceptable and stable  Respiratory status: acceptable and spontaneous ventilation  Hydration status: acceptable    Comments: Vitals signs as noted in nursing documentation as per protocol.

## 2025-01-15 NOTE — DISCHARGE INSTRUCTIONS
- Discharge patient to home (ambulatory).   - High fiber diet.   - Antireflux measures  - Continue present medications.   - Avoid vaping  and smoking  - PPI daily  - Await pathology results.   - Repeat colonoscopy in 7-10 years for surveillance.   - Return to GI office in 8 weeks.   Rest today  No pushing,pulling,tugging,heavy lifting, or strenuous activity   No major decision making,driving,or drinking alcoholic beverages for 24 hours due to the sedation you received  Always use good hand hygiene/washing technique  No driving on pain medication.    To assist you in voiding:  Drink plenty of fluids  Listen to running water while attempting to void.    If you are unable to urinate and you have an uncomfortable urge to void or it has been   6 hours since you were discharged, return to the Emergency Room

## 2025-01-15 NOTE — ANESTHESIA PREPROCEDURE EVALUATION
Anesthesia Evaluation     Patient summary reviewed and Nursing notes reviewed   NPO Solid Status: > 8 hours  NPO Liquid Status: > 8 hours           Airway   Mallampati: II  TM distance: >3 FB  Neck ROM: full  No difficulty expected  Dental - normal exam     Pulmonary - normal exam   (+) a smoker Current, Smoked day of surgery, COPD,  Cardiovascular - normal exam  Exercise tolerance: good (4-7 METS)    (+) hypertension, hyperlipidemia      Neuro/Psych  (+) headaches, psychiatric history Anxiety and Depression  GI/Hepatic/Renal/Endo    (+) obesity, morbid obesity, GERD, liver disease fatty liver disease    Musculoskeletal     (+) neck pain  Abdominal    Substance History - negative use     OB/GYN negative ob/gyn ROS         Other                      Anesthesia Plan    ASA 2     MAC     (Risks and benefits discussed including risk of aspiration, recall and dental damage. All patient questions answered.    Will continue with plan of care.)  intravenous induction     Anesthetic plan, risks, benefits, and alternatives have been provided, discussed and informed consent has been obtained with: patient.  Pre-procedure education provided    CODE STATUS:

## 2025-01-15 NOTE — H&P
[facility]  HISTORY AND PHYSICAL    Patient Name: Ronnie Reveles  : 1980  MRN: [id number]    Chief Complaint:   For screening colonoscopy/ EGD    History Of Presenting illness    Average risk screening  GERD        Past Medical History:   Diagnosis Date    Abnormal finding of lung     COPD (chronic obstructive pulmonary disease)     Elevated cholesterol     Fatty liver     DAKOTA (generalized anxiety disorder)     Hx of exercise stress test     Hyperlipidemia     Hypertension     MDD (major depressive disorder)     Migraine     Obesity     Tobacco abuse        Past Surgical History:   Procedure Laterality Date    APPENDECTOMY      CHOLECYSTECTOMY         Social History     Socioeconomic History    Marital status:    Tobacco Use    Smoking status: Every Day     Current packs/day: 1.00     Average packs/day: 1 pack/day for 41.0 years (41.0 ttl pk-yrs)     Types: Cigarettes     Start date: 1984     Passive exposure: Current    Smokeless tobacco: Never   Vaping Use    Vaping status: Every Day    Substances: Nicotine, Flavoring    Devices: Disposable   Substance and Sexual Activity    Alcohol use: Never    Drug use: Never    Sexual activity: Defer       Family History   Problem Relation Age of Onset    Arthritis Mother     Migraine headaches Mother        Prior to Admission Medications:  Medications Prior to Admission   Medication Sig Dispense Refill Last Dose/Taking    albuterol sulfate  (90 Base) MCG/ACT inhaler Inhale 2 puffs 4 (Four) Times a Day. 18 g 1 Past Week    aspirin 81 MG EC tablet Take 1 tablet by mouth Daily. 90 tablet 3 2025    butalbital-acetaminophen-caffeine (Esgic) -40 MG per tablet Take 1 tablet by mouth Every 4 (Four) Hours As Needed for Headache or Migraine. 20 tablet 0 Past Week    dexAMETHasone (DECADRON) 0.5 MG tablet 6 po x1d; then 5 po x 1d; then 4 po x 1d; then 3 po x 1d; then 2 po x 1d; then 1 po x1d; then STOP 21 tablet 0 Past Week     diazePAM (VALIUM) 2 MG tablet Take 1.5 tablets by mouth Every 12 (Twelve) Hours As Needed for Anxiety. 60 tablet 2 Past Week    fluticasone (FLONASE) 50 MCG/ACT nasal spray Administer 2 sprays into the nostril(s) as directed by provider Daily. 18.2 mL 2 Past Week    levocetirizine (XYZAL) 5 MG tablet Take 1 tablet by mouth Every Evening. 90 tablet 3 Past Week    lisinopril (PRINIVIL,ZESTRIL) 10 MG tablet Take 1 tablet by mouth Daily. 90 tablet 3 Past Week    pantoprazole (PROTONIX) 40 MG EC tablet Take 1 tablet by mouth Daily. 90 tablet 3 Past Week    Rimegepant Sulfate (Nurtec) 75 MG tablet dispersible tablet Take 1 tablet by mouth 1 (One) Time As Needed (migraine). 10 tablet 3 Past Week    rosuvastatin (CRESTOR) 10 MG tablet Take 1 tablet by mouth Every Night. 90 tablet 3 Past Week    sodium-potassium-magnesium sulfates (SUPREP) 17.5-3.13-1.6 GM/177ML solution oral solution Take 2 bottles by mouth 1 (One) Time for 1 dose. Please see prep instructions from office 354 mL 0 1/15/2025 Morning    venlafaxine XR (EFFEXOR-XR) 150 MG 24 hr capsule Take 1 capsule by mouth Daily. 90 capsule 2 Past Week    vitamin C (ASCORBIC ACID) 250 MG tablet Take 1 tablet by mouth Daily. 90 tablet 3 Past Week    vitamin D3 125 MCG (5000 UT) capsule capsule Take 1 capsule by mouth Daily. 30 capsule 3 Past Week       Allergies:  Allergies   Allergen Reactions    Atorvastatin Myalgia    Fenofibrate Rash    Ibuprofen GI Intolerance    Sulfa Antibiotics Hives    Tramadol Hives    Zoloft [Sertraline] Irritability        Vitals: Temp:  [97.5 °F (36.4 °C)] 97.5 °F (36.4 °C)  Heart Rate:  [89] 89  Resp:  [18] 18  BP: (127)/(96) 127/96    Review Of Systems:  Constitutional:  Negative for chills, fever, and unexpected weight change.  Respiratory:  Negative for cough, chest tightness, shortness of breath, and wheezing.  Cardiovascular:  Negative for chest pain, palpitations, and leg swelling.  Gastrointestinal:  Negative for abdominal distention,  abdominal pain, nausea, vomiting.  Neurological:  Negative for weakness, numbness, and headaches.     Physical Exam:    General Appearance:  Alert, cooperative, in no acute distress.   Lungs:   Clear to auscultation, respirations regular, even and                 unlabored.   Heart:  Regular rhythm and normal rate.   Abdomen:   Normal bowel sounds, no masses, no organomegaly. Soft, nontender, nondistended   Neurologic: Alert and oriented x 3. Moves all four limbs equally       Assessment & Plan     Assessment:  Active Problems:    Gastroesophageal reflux disease    Encounter for colorectal cancer screening      Plan: Esophagogastroduodenoscopy with possible biopsy, polypectomy, dilatation, endoscopic band ligation or control of bleeding (N/A), Colonoscopy with possible biopsy, polypectomy, ablation of arteriovenous malformations or control of bleeding   CPT CODE:  (N/A)     Yves Gee MD  1/15/2025

## 2025-01-16 LAB — REF LAB TEST METHOD: NORMAL

## 2025-04-01 ENCOUNTER — OFFICE VISIT (OUTPATIENT)
Age: 45
End: 2025-04-01
Payer: COMMERCIAL

## 2025-04-01 ENCOUNTER — OFFICE VISIT (OUTPATIENT)
Dept: GASTROENTEROLOGY | Facility: CLINIC | Age: 45
End: 2025-04-01
Payer: COMMERCIAL

## 2025-04-01 VITALS
SYSTOLIC BLOOD PRESSURE: 120 MMHG | DIASTOLIC BLOOD PRESSURE: 82 MMHG | BODY MASS INDEX: 29.73 KG/M2 | HEART RATE: 81 BPM | WEIGHT: 185 LBS | OXYGEN SATURATION: 98 % | HEIGHT: 66 IN

## 2025-04-01 VITALS
DIASTOLIC BLOOD PRESSURE: 86 MMHG | HEART RATE: 88 BPM | BODY MASS INDEX: 29.7 KG/M2 | SYSTOLIC BLOOD PRESSURE: 142 MMHG | OXYGEN SATURATION: 99 % | WEIGHT: 184 LBS

## 2025-04-01 DIAGNOSIS — I10 ESSENTIAL HYPERTENSION: ICD-10-CM

## 2025-04-01 DIAGNOSIS — K25.9 GASTRIC EROSION, UNSPECIFIED CHRONICITY: ICD-10-CM

## 2025-04-01 DIAGNOSIS — K21.9 GERD WITHOUT ESOPHAGITIS: ICD-10-CM

## 2025-04-01 DIAGNOSIS — K76.0 METABOLIC DYSFUNCTION-ASSOCIATED STEATOTIC LIVER DISEASE (MASLD): ICD-10-CM

## 2025-04-01 DIAGNOSIS — R74.01 ELEVATED AST (SGOT): ICD-10-CM

## 2025-04-01 DIAGNOSIS — Z12.11 COLON CANCER SCREENING: ICD-10-CM

## 2025-04-01 DIAGNOSIS — E29.1 TESTOSTERONE DEFICIENCY IN MALE: Primary | ICD-10-CM

## 2025-04-01 DIAGNOSIS — K26.9 DUODENAL EROSION: ICD-10-CM

## 2025-04-01 DIAGNOSIS — G43.109 MIGRAINE WITH AURA AND WITHOUT STATUS MIGRAINOSUS, NOT INTRACTABLE: ICD-10-CM

## 2025-04-01 DIAGNOSIS — R29.898 DECREASED GRIP STRENGTH OF RIGHT HAND: ICD-10-CM

## 2025-04-01 DIAGNOSIS — R20.2 RIGHT HAND PARESTHESIA: ICD-10-CM

## 2025-04-01 DIAGNOSIS — F40.01 PANIC DISORDER WITH AGORAPHOBIA, MILD AGORAPHOBIC AVOIDANCE AND MODERATE PANIC ATTACKS: ICD-10-CM

## 2025-04-01 DIAGNOSIS — K21.9 GASTROESOPHAGEAL REFLUX DISEASE WITHOUT ESOPHAGITIS: Primary | ICD-10-CM

## 2025-04-01 PROCEDURE — 99214 OFFICE O/P EST MOD 30 MIN: CPT | Performed by: FAMILY MEDICINE

## 2025-04-01 RX ORDER — AZITHROMYCIN 250 MG/1
TABLET, FILM COATED ORAL
Qty: 6 TABLET | Refills: 0 | Status: SHIPPED | OUTPATIENT
Start: 2025-04-01

## 2025-04-01 RX ORDER — TESTOSTERONE CYPIONATE 200 MG/ML
INJECTION, SOLUTION INTRAMUSCULAR
COMMUNITY
Start: 2025-03-15

## 2025-04-01 RX ORDER — TADALAFIL 5 MG/1
TABLET ORAL
COMMUNITY
Start: 2025-02-05

## 2025-04-01 RX ORDER — ESOMEPRAZOLE MAGNESIUM 40 MG/1
40 CAPSULE, DELAYED RELEASE ORAL
Qty: 90 CAPSULE | Refills: 3 | Status: SHIPPED | OUTPATIENT
Start: 2025-04-01

## 2025-04-01 RX ORDER — DEXAMETHASONE 0.5 MG/1
TABLET ORAL
Qty: 21 TABLET | Refills: 0 | Status: SHIPPED | OUTPATIENT
Start: 2025-04-01

## 2025-04-01 NOTE — PROGRESS NOTES
Follow Up Note     Date: 2025   Patient Name: Ronnie Reveles  MRN: [id number]  : 1980     Primary Care Provider: Kentrell Saravia DO     Chief Complaint   Patient presents with    Results     Colonoscopy and Upper GI Endoscopy      History of present illness:   2025  Ronnie Reveles is a 45 y.o. male who is here today for follow up regarding Results (Colonoscopy and Upper GI Endoscopy).    Acid reflux symptoms still the same. He has taken protonix for years. He does recall taking nexium in the past which seemed to help better. Had EGD and colonoscopy since last visit, would like to discuss those results. He has stopped smoking and reports some weight gain since then.     Interval History:  2024  Has lost weight intentionally since he found out about having fatty liver, has lost about 30 lbs in the past couple of months. No alcohol use or alcoholic history. Has a history of HLD but no diabetes.   Last colonoscopy was 8-10 years ago, told normal, completely at [facility]. Having regular daily stools. No rectal bleeding. No abdominal pain.   Taking Protonix 40 mg once daily still sometimes with symptoms, takes mylanta PRN. Wakes up with acid into his throat at times. Had an EGD back in , not sure of those results.     Subjective     Past Medical History:   Diagnosis Date    Abnormal finding of lung     COPD (chronic obstructive pulmonary disease)     Elevated cholesterol     Fatty liver     DAKOTA (generalized anxiety disorder)     Hx of exercise stress test     Hyperlipidemia     Hypertension     MDD (major depressive disorder)     Migraine     Obesity     Tobacco abuse      Past Surgical History:   Procedure Laterality Date    APPENDECTOMY      CHOLECYSTECTOMY      COLONOSCOPY N/A 1/15/2025    Procedure: Colonoscopy with cold biopsy polypectomy;  Surgeon: Yves Gee MD;  Location: [facility] ENDOSCOPY;  Service: Gastroenterology;  Laterality: N/A;    ENDOSCOPY N/A 1/15/2025     Procedure: Esophagogastroduodenoscopy with biopsies;  Surgeon: Yves Gee MD;  Location: [facility] ENDOSCOPY;  Service: Gastroenterology;  Laterality: N/A;     Family History   Problem Relation Age of Onset    Arthritis Mother     Migraine headaches Mother      Social History     Socioeconomic History    Marital status:    Tobacco Use    Smoking status: Every Day     Current packs/day: 1.00     Average packs/day: 1 pack/day for 41.2 years (41.2 ttl pk-yrs)     Types: Cigarettes     Start date: 1/4/1984     Passive exposure: Current    Smokeless tobacco: Never   Vaping Use    Vaping status: Every Day    Substances: Nicotine, Flavoring    Devices: Disposable   Substance and Sexual Activity    Alcohol use: Never    Drug use: Never    Sexual activity: Defer     Current Outpatient Medications:     albuterol sulfate  (90 Base) MCG/ACT inhaler, Inhale 2 puffs 4 (Four) Times a Day., Disp: 18 g, Rfl: 1    aspirin 81 MG EC tablet, Take 1 tablet by mouth Daily., Disp: 90 tablet, Rfl: 3    butalbital-acetaminophen-caffeine (Esgic) -40 MG per tablet, Take 1 tablet by mouth Every 4 (Four) Hours As Needed for Headache or Migraine., Disp: 20 tablet, Rfl: 0    dexAMETHasone (DECADRON) 0.5 MG tablet, 6 po x1d; then 5 po x 1d; then 4 po x 1d; then 3 po x 1d; then 2 po x 1d; then 1 po x1d; then STOP, Disp: 21 tablet, Rfl: 0    diazePAM (VALIUM) 2 MG tablet, Take 1.5 tablets by mouth Every 12 (Twelve) Hours As Needed for Anxiety., Disp: 60 tablet, Rfl: 2    fluticasone (FLONASE) 50 MCG/ACT nasal spray, Administer 2 sprays into the nostril(s) as directed by provider Daily., Disp: 18.2 mL, Rfl: 2    levocetirizine (XYZAL) 5 MG tablet, Take 1 tablet by mouth Every Evening., Disp: 90 tablet, Rfl: 3    lisinopril (PRINIVIL,ZESTRIL) 10 MG tablet, Take 1 tablet by mouth Daily., Disp: 90 tablet, Rfl: 3    pantoprazole (PROTONIX) 40 MG EC tablet, Take 1 tablet by mouth Daily., Disp: 90 tablet, Rfl: 3    Rimegepant  Sulfate (Nurtec) 75 MG tablet dispersible tablet, Take 1 tablet by mouth 1 (One) Time As Needed (migraine)., Disp: 10 tablet, Rfl: 3    rosuvastatin (CRESTOR) 10 MG tablet, Take 1 tablet by mouth Every Night., Disp: 90 tablet, Rfl: 3    tadalafil (CIALIS) 5 MG tablet, TAKE 1 TABLET BY MOUTH ONCE DAILY AS NEEDED FOR ED. MAX OF 4 TABS PER DAY., Disp: , Rfl:     Testosterone Cypionate (DEPOTESTOTERONE CYPIONATE) 200 MG/ML injection, INJECT 1 ML INTRAMUSCULARLY ONCE A WEEK, Disp: , Rfl:     venlafaxine XR (EFFEXOR-XR) 150 MG 24 hr capsule, Take 1 capsule by mouth Daily., Disp: 90 capsule, Rfl: 2    vitamin C (ASCORBIC ACID) 250 MG tablet, Take 1 tablet by mouth Daily., Disp: 90 tablet, Rfl: 3    vitamin D3 125 MCG (5000 UT) capsule capsule, Take 1 capsule by mouth Daily., Disp: 30 capsule, Rfl: 3    Allergies   Allergen Reactions    Atorvastatin Myalgia    Fenofibrate Rash    Ibuprofen GI Intolerance    Sulfa Antibiotics Hives    Tramadol Hives    Zoloft [Sertraline] Irritability     The following portions of the patient's history were reviewed and updated as appropriate: allergies, current medications, past family history, past medical history, past social history, past surgical history and problem list.    Objective     Physical Exam  Constitutional:       General: He is not in acute distress.     Appearance: Normal appearance. He is well-developed. He is not diaphoretic.   HENT:      Head: Normocephalic and atraumatic.      Right Ear: External ear normal.      Left Ear: External ear normal.      Nose: Nose normal.      Mouth/Throat:      Comments: edentulous  Eyes:      General: No scleral icterus.        Right eye: No discharge.         Left eye: No discharge.      Conjunctiva/sclera: Conjunctivae normal.   Neck:      Trachea: No tracheal deviation.   Pulmonary:      Effort: Pulmonary effort is normal. No respiratory distress.   Musculoskeletal:         General: Normal range of motion.      Cervical back: Normal  range of motion.   Skin:     Coloration: Skin is not pale.      Findings: No erythema or rash.   Neurological:      Mental Status: He is alert and oriented to person, place, and time.      Coordination: Coordination normal.   Psychiatric:         Mood and Affect: Mood normal.         Behavior: Behavior normal.         Thought Content: Thought content normal.         Judgment: Judgment normal.       Vitals:    04/01/25 0940   BP: 142/86   Pulse: 88   SpO2: 99%   Weight: 83.5 kg (184 lb)     Results Review:   I reviewed the patient's new clinical results.    Comprehensive Metabolic Panel (08/24/2024 09:48)  CBC Auto Differential (08/24/2024 09:48)     US abd 7/2023  FINDINGS:   The pancreas is obscured by bowel gas. The liver parenchyma  is of increased echogenicity and liver is enlarged at 19.5 cm. This is  consistent with fatty infiltration. Portal vein is normal in size with  normal direction of flow. The gall bladder is surgically absent. The  common duct 5 mm. The right kidney measures 11.2 cm in length and is  normal in echogenicity without hydronephrosis. The left kidney measures  9.9 cm in length and is normal in echogenicity without hydronephrosis.  Spleen is normal in size and contains several calcified granulomas. The  aorta is normal in caliber. The vena cava is unremarkable.  IMPRESSION:  Enlarged, fatty infiltrated liver and a postcholecystectomy  patient.    EGD and colonoscopy 1/15/2025  - Normal oropharynx. - Z-line regular, 36 cm from the incisors. - Ectopic gastric mucosa in the upper third of the esophagus. Biopsied. - Erosive gastropathy with no stigmata of recent bleeding. - Erythematous mucosa in the posterior wall of the stomach and antrum. Biopsied. - Minimal erosions duodenl Bulb - Normal ampulla, first portion of the duodenum, second portion of the duodenum and third portion of the duodenum.   - Perianal skin tags found on perianal exam. - One 3 mm polyp at the appendiceal orifice, removed  with a cold biopsy forceps. Resected and retrieved. Clinically lymphoid aggregate - Non-bleeding external and internal hemorrhoids. - The examined portion of the ileum was normal. - Mild developing diverticulosis suspected.   Pathology DIAGNOSIS:  A.     ANTRUM AND BODY, BIOPSY:  Gastric mucosa with mild chronic inactive gastritis  Negative for H. pylori organisms on routine stain  Negative for intestinal metaplasia, dysplasia, or malignancy  B.     ESOPHAGUS, BIOPSY, PROXIMAL FOR INLET POUCH:  Inlet pouch  Negative for dysplasia or malignancy  C.     CECUM POLYP:  Polypoid fragment of benign colonic mucosa with prominent lymphoid aggregate  Negative for epithelial dysplasia     Assessment / Plan      1. Gastroesophageal reflux disease without esophagitis  2. Gastric erosion, unspecified chronicity  3. Duodenal erosion  Has long history of severe GERD symptoms. Taking Protonix 40 mg once daily still sometimes with symptoms, takes mylanta PRN. Wakes up with acid into his throat at times. EGD 1/2025 showed ectopic gastric mucosa in the upper third of the esophagus, erosive gastropathy, erythema in the stomach, minimal erosions in duodenal bulb. Path with no H pylori, esophagus biopsy with inlet patch.    Follow an antireflux regimen  Stop protonix  Start nexium 40 mg once daily  Avoid smoking and vaping    - esomeprazole (nexIUM) 40 MG capsule; Take 1 capsule by mouth Every Morning Before Breakfast.  Dispense: 90 capsule; Refill: 3    4. Colon cancer screening  Colonoscopy 1/2025 had 1 benign colon tissue polyp removed. No family history of colon cancer.     Repeat colonoscopy in 10 years for screening, due 1/2035    5. Metabolic dysfunction-associated steatotic liver disease (MASLD)  6. Elevated AST (SGOT)  New diagnosis of fatty liver. US abd 7/2023 showed hepatomegaly and fatty liver. Last labs 8/2024 AST 41, ALT 38, bili 0.5, alk phos 85. Nonalcoholic. Has gained 7 lbs since last visit. BMI is 29. Has a history of  HLD but no diabetes.      Weight loss goal to lose 10-15 lbs in the next 6 months  Mediterranean diet recommended  Fasting lab at next PCP appt, previously ordered (GATES fibroSURE)    Follow Up:   Return in about 6 months (around 10/1/2025) for recheck liver disease, recheck GERD.    Sammie Gonzalez PA-C  [facility]  4/1/2025  12:26 EDT

## 2025-04-01 NOTE — PROGRESS NOTES
Established Patient        Chief Complaint:   Chief Complaint   Patient presents with    testosterone        Ronnie Reveles is a 45 y.o. male    History of Present Illness:   Here today in scheduled follow-up visit of testosterone deficiency, GERD, hypertension, panic disorder and migraine headache disorder.    He denies any changes to the frequency/severity of his migraine headache continues to realize therapeutic benefit of current medication regimen.  He has been utilizing testosterone supplementation denies any chest pain, syncope, palpitations or vertigo.  No hematuria/dysuria.  No BRB/BTS.  Denies aspiration/dysphagia.  Tolerating all nutritional intake.  He reports good hydration habits.  Denies orthopnea.    Developing symptoms of maxillary sinusitis, has recurrently this time of year.  Subjective     The following portions of the patient's history were reviewed and updated as appropriate: allergies, current medications, past family history, past medical history, past social history, past surgical history and problem list.    Allergies   Allergen Reactions    Atorvastatin Myalgia    Fenofibrate Rash    Ibuprofen GI Intolerance    Sulfa Antibiotics Hives    Tramadol Hives    Zoloft [Sertraline] Irritability       Review of Systems  Constitutional: Negative for fever. Negative for chills, diaphoresis; malaise/fatigue as per above.  HENT: No dysphagia; no changes to vision/hearing/smell/taste; no epistaxis.  Otherwise, as per above.  Eyes: Negative for redness and visual disturbance.   Respiratory: negative for shortness of breath. Negative for chest pain.  Cough productive of clear/yellow phlegm at times.  No hemoptysis.  Cardiovascular: Negative for chest pain and palpitations.   Gastrointestinal: Denies BRB/BTS.  Endocrine: Negative for cold intolerance and heat intolerance.   Genitourinary: Negative for difficulty urinating, dysuria and frequency.  Severely decreased  "libido.  Musculoskeletal: Paroxysmal spastic torticollis.  Diffuse arthralgias and myalgias.  Discomfort to the ulnar aspect of the right wrist, ulnar deviation of hand worsens discomfort.  Skin: No new rashes.  Neurological: Negative for syncope, weakness; chronic migraine headache disorder.  Hematological: Negative for adenopathy. Does not bruise/bleed easily.   Psychiatric/Behavioral: Negative for confusion. The patient is not nervous/anxious.    Objective     Physical Exam   Vital Signs: /82   Pulse 81   Ht 167.6 cm (66\")   Wt 83.9 kg (185 lb)   SpO2 98%   BMI 29.86 kg/m²   BMI is >= 30 and <35. (Class 1 Obesity). The following options were offered after discussion;: exercise counseling/recommendations and nutrition counseling/recommendations  Patient's (Body mass index is 29.86 kg/m².) indicates that they are obese (BMI >30) with health related conditions that include hypertension, dyslipidemias, and GERD . Weight is improving with lifestyle modifications. BMI is is above average; BMI management plan is completed. We discussed low calorie, low carb based diet program, portion control, increasing exercise, joining a fitness center or start home based exercise program, and Weight Watchers or other Commercial based weight reduction program.      General Appearance: alert, oriented x 3, no acute distress.  Skin: warm and dry.    HEENT: Atraumatic.  pupils round and reactive to light and accommodation, oral mucosa pink and moist.  Nares patent without epistaxis.  External auditory canals are patent tympanic membranes intact.  Neck: supple, no JVD, trachea midline.  No thyromegaly.  There is spasticity noted to the paravertebral musculature of the cervical spine, limiting all aspects of ROM testing.  Lungs: Rhonchus, referred, upper airway congestion is cleared with light cough, unlabored breathing effort.  Heart: RRR, normal S1 and S2, no S3, no rub.  Abdomen: soft, non-tender, no palpable bladder, " present bowel sounds to auscultation ×4.  No guarding or rigidity.  No CVA tenderness.  Extremities: no clubbing, cyanosis or edema.  Good range of motion actively and passively.  Symmetric muscle strength and development.  Mild tenderness on palpation of right ulnar crease.  Without any findings of joint instability.  No palmar atrophy noted.  Neuro: normal speech and mental status.  Cranial nerves II through XII intact.  No anosmia. DTR 2+; proprioception intact.  No focal motor/sensory deficits.        Assessment and Plan      Assessment/Plan:   Diagnoses and all orders for this visit:    1. Testosterone deficiency in male (Primary)    2. Right hand paresthesia    3. Decreased  strength of right hand    4. Essential hypertension    5. GERD without esophagitis    6. Panic disorder with agoraphobia, mild agoraphobic avoidance and moderate panic attacks    7. Migraine with aura and without status migrainosus, not intractable    Other orders  -     dexAMETHasone (DECADRON) 0.5 MG tablet; 6 po x1d; then 5 po x 1d; then 4 po x 1d; then 3 po x 1d; then 2 po x 1d; then 1 po x1d; then STOP  Dispense: 21 tablet; Refill: 0  -     azithromycin (Zithromax) 250 MG tablet; Take 2 tablets the first day, then 1 tablet daily for 4 days.  Dispense: 6 tablet; Refill: 0    Discussed need for stress/anxiety reducing techniques such as prayer/meditation/breathing and counting exercises and avoidance of stress producing environments/situations; will follow clinically.    Vital signs demonstrate hemodynamic stability, no findings suggestive of unstable angina.  Discussed need for reduction in sodium/salt/caffeine intake; improve sleep habits as able; inc formal CV exercise program with goal of vigorous activity most, if not all, days of the week; goal of 150 min of sustained HR CV exercise total per week.    Plan treatment of recurrent maxillary sinusitis with a course of azithromycin, as well as a 6-day low-dose dexamethasone taper.   This has worked well for him in the past during seasonal exacerbations.    Continue avoidance of known migraine headache triggers.    EMG/nerve conduction study to be ordered of right extremity.      Discussion Summary:    Discussed plan of care in detail with pt today; pt verb understanding and agrees.    I spent 35 minutes caring for Ronnie on this date of service. This time includes time spent by me in the following activities:preparing for the visit, performing a medically appropriate examination and/or evaluation , counseling and educating the patient/family/caregiver, ordering medications, tests, or procedures, documenting information in the medical record, and care coordination    I confirm accuracy of unchanged data/findings which have been carried forward from previous visit, as well as I have updated appropriately those that have changed.      Follow up:  No follow-ups on file.     There are no Patient Instructions on file for this visit.    Kentrell Saravia DO  04/02/25  00:07 EDT

## 2025-04-24 ENCOUNTER — TELEPHONE (OUTPATIENT)
Age: 45
End: 2025-04-24
Payer: COMMERCIAL

## 2025-04-24 NOTE — TELEPHONE ENCOUNTER
Pt's wife called in regards to pt having side affects from taking testosterone injections. She stated he is experiencing mood swings, elevated bp, redness of the face and neck. She is concerned that the pt needs his bp medication to be increased because his bottom number is 100. Please advise.

## 2025-04-29 ENCOUNTER — TELEPHONE (OUTPATIENT)
Age: 45
End: 2025-04-29
Payer: COMMERCIAL

## 2025-04-29 DIAGNOSIS — R20.2 RIGHT HAND PARESTHESIA: Primary | ICD-10-CM

## 2025-04-29 DIAGNOSIS — R29.898 DECREASED GRIP STRENGTH OF RIGHT HAND: ICD-10-CM

## 2025-04-29 NOTE — TELEPHONE ENCOUNTER
PT'S WIFE CALLED REGARDING A REFERRAL FOR A NERVE CONDUCTION TEST - I LOOKED IN HIS CHART BUT I DON'T SEE ANYTHING FOR THIS. SHE SAID THEY DISCUSSED IT WITH DR CRISTINA AT THE LAST VISIT 04/21/2025.    CALL HIS WIFE AT [phone], HE CAN NOT ANSWER HIS PHONE AT WORK.

## 2025-05-13 ENCOUNTER — OFFICE VISIT (OUTPATIENT)
Age: 45
End: 2025-05-13
Payer: COMMERCIAL

## 2025-05-13 VITALS
HEART RATE: 86 BPM | WEIGHT: 180 LBS | OXYGEN SATURATION: 97 % | HEIGHT: 66 IN | BODY MASS INDEX: 28.93 KG/M2 | SYSTOLIC BLOOD PRESSURE: 150 MMHG | DIASTOLIC BLOOD PRESSURE: 98 MMHG

## 2025-05-13 DIAGNOSIS — R20.2 PARESTHESIA OF HAND, BILATERAL: ICD-10-CM

## 2025-05-13 DIAGNOSIS — T50.905A ADVERSE EFFECT OF DRUG, INITIAL ENCOUNTER: Primary | ICD-10-CM

## 2025-05-13 DIAGNOSIS — R29.898 DECREASED GRIP STRENGTH: ICD-10-CM

## 2025-05-13 DIAGNOSIS — F43.0 ACUTE REACTION TO SITUATIONAL STRESS: ICD-10-CM

## 2025-05-13 DIAGNOSIS — I10 ESSENTIAL HYPERTENSION: ICD-10-CM

## 2025-05-13 RX ORDER — SILDENAFIL 100 MG/1
TABLET, FILM COATED ORAL
COMMUNITY
Start: 2025-03-26

## 2025-05-13 RX ORDER — CHLORDIAZEPOXIDE HYDROCHLORIDE 5 MG/1
5 CAPSULE, GELATIN COATED ORAL 2 TIMES DAILY
Qty: 28 CAPSULE | Refills: 0 | Status: SHIPPED | OUTPATIENT
Start: 2025-05-13 | End: 2025-05-27

## 2025-05-13 NOTE — PROGRESS NOTES
Established Patient        Chief Complaint:   Chief Complaint   Patient presents with    testosterone      Reactions to injection        Ronnie Reveles is a 45 y.o. male    History of Present Illness:   Here today for evaluation of recent reaction to testosterone supplementation, as well as follow-up of decreased  strength bilateral hands with associated paresthesia, as well as chronic hypertension    Sig anxiety after initial dosing of testosterone dosing by urology.  He describes an intense feeling of nervousness and anxiety, described almost as panic in general.  He describes diffuse erythema to the neck and face, consistent with flush sensation.    He denies fever, chills or night sweats.  He reports that his sleep has become problematic since injection additionally.  Patient feels it is associated with the dosing only.    He denies chest pain, syncope, palpitations or vertigo.  Tolerating all nutritional intake, reports good hydration habits.    Continues to be bothered by decreased  strength bilateral hands, does seem to be associated with certain aspects of work-related obligations, particularly those that are highly repetitive.  Maintains good urine output without hematuria, denies any BRB/BTS.      Subjective     The following portions of the patient's history were reviewed and updated as appropriate: allergies, current medications, past family history, past medical history, past social history, past surgical history and problem list.    Allergies   Allergen Reactions    Atorvastatin Myalgia    Fenofibrate Rash    Ibuprofen GI Intolerance    Sulfa Antibiotics Hives    Tramadol Hives    Zoloft [Sertraline] Irritability       Review of Systems  Constitutional: Negative for fever. Negative for chills, diaphoresis; malaise/fatigue as per above.  HENT: No dysphagia; no changes to vision/hearing/smell/taste; no epistaxis.  Otherwise, as per above.  Eyes: Negative for redness and  "visual disturbance.   Respiratory: negative for shortness of breath. Negative for chest pain.  Cough productive of clear/yellow phlegm at times.  No hemoptysis.  Cardiovascular: Negative for chest pain and palpitations.   Gastrointestinal: Denies BRB/BTS.  Endocrine: Negative for cold intolerance and heat intolerance.   Genitourinary: Negative for difficulty urinating, dysuria and frequency.  Decreased libido.  Musculoskeletal: Paroxysmal spastic torticollis.  Diffuse arthralgias and myalgias.  Discomfort to the ulnar aspect of the right wrist, ulnar deviation of hand worsens discomfort.  Decreased  strength bilateral hands, most noticeable to right.  Skin: Erythema to face/neck.  Neurological: Negative for syncope, weakness; chronic migraine headache disorder.  Paresthesias to bilateral hands, symmetric.  Hematological: Negative for adenopathy. Does not bruise/bleed easily.   Psychiatric/Behavioral: Negative for confusion.  Generalized nervousness as per above.    Objective     Physical Exam   Vital Signs: /98   Pulse 86   Ht 167.6 cm (66\")   Wt 81.6 kg (180 lb)   SpO2 97%   BMI 29.05 kg/m²   BMI is >= 30 and <35. (Class 1 Obesity). The following options were offered after discussion;: exercise counseling/recommendations and nutrition counseling/recommendations  Patient's (Body mass index is 29.05 kg/m².) indicates that they are obese (BMI >30) with health related conditions that include hypertension, dyslipidemias, and GERD . Weight is improving with lifestyle modifications. BMI is is above average; BMI management plan is completed. We discussed low calorie, low carb based diet program, portion control, increasing exercise, joining a fitness center or start home based exercise program, and Weight Watchers or other Commercial based weight reduction program.      General Appearance: alert, oriented x 3, no acute distress.  Skin: warm and dry.  Flushing to face/neck.  HEENT: Atraumatic.  pupils round " and reactive to light and accommodation, oral mucosa pink and moist.  Nares patent without epistaxis.  External auditory canals are patent tympanic membranes intact.  Neck: supple, no JVD, trachea midline.  No thyromegaly.  There is spasticity noted to the paravertebral musculature of the cervical spine, limiting all aspects of ROM testing.  Lungs: Rhonchus, referred, upper airway congestion is cleared with light cough, unlabored breathing effort.  Heart: RRR, normal S1 and S2, no S3, no rub.  Abdomen: soft, non-tender, no palpable bladder, present bowel sounds to auscultation ×4.  No guarding or rigidity.  No CVA tenderness.  Extremities: no clubbing, cyanosis or edema.  Good range of motion actively and passively.  Decreased  strength bilaterally, more profound to the right.  Mild tenderness on palpation of right ulnar crease.  Without any findings of joint instability.  No palmar atrophy noted.  Neuro: normal speech and mental status.  Cranial nerves II through XII intact.  No anosmia. DTR 2+; proprioception intact.  No focal motor/sensory deficits.        Assessment and Plan      Assessment/Plan:   Diagnoses and all orders for this visit:    1. Adverse effect of drug, initial encounter (Primary)    2. Acute reaction to situational stress  -     chlordiazePOXIDE (LIBRIUM) 5 MG capsule; Take 1 capsule by mouth 2 (Two) Times a Day for 14 days.  Dispense: 28 capsule; Refill: 0      Chlordiazepoxide bid x 14 days; will hold diazepam while taking.    Would recommend reduction in dosing for T supplementation; dec to 50 mg biweekly.    Keep appt for EMG/NCS.    Discussed need for stress/anxiety reducing techniques such as prayer/meditation/breathing and counting exercises and avoidance of stress producing environments/situations; will follow clinically.    VSS, appears HD asymptomatic.        Discussion Summary:    Discussed plan of care in detail with pt today; pt verb understanding and agrees.    I spent 35  minutes caring for Ronnie on this date of service. This time includes time spent by me in the following activities:preparing for the visit, performing a medically appropriate examination and/or evaluation , counseling and educating the patient/family/caregiver, ordering medications, tests, or procedures, documenting information in the medical record, and care coordination    I confirm accuracy of unchanged data/findings which have been carried forward from previous visit, as well as I have updated appropriately those that have changed.    Follow up:  No follow-ups on file.     There are no Patient Instructions on file for this visit.    Kentrell Saravia DO  05/13/25  15:48 EDT

## 2025-05-15 ENCOUNTER — TELEPHONE (OUTPATIENT)
Age: 45
End: 2025-05-15

## 2025-05-15 NOTE — TELEPHONE ENCOUNTER
Patient is wanting a letter typed out stating what he can't do due to his issue. I asked, If HR had paperwork they can send us to know what exactly they are needing and patient is stating, they don't do that. Please advise, thank you.

## 2025-05-15 NOTE — TELEPHONE ENCOUNTER
Called and spoke to Maxx about what exactly his workplace needs the paper to state and where to send it to. He tried to explain to me but wasn't fully sure so he has given my number to his HR department so they can contact me and let me know what the paper needs to state.

## 2025-05-22 ENCOUNTER — OFFICE VISIT (OUTPATIENT)
Age: 45
End: 2025-05-22
Payer: COMMERCIAL

## 2025-05-22 VITALS
DIASTOLIC BLOOD PRESSURE: 80 MMHG | OXYGEN SATURATION: 98 % | WEIGHT: 181 LBS | BODY MASS INDEX: 29.09 KG/M2 | SYSTOLIC BLOOD PRESSURE: 120 MMHG | HEART RATE: 101 BPM | HEIGHT: 66 IN

## 2025-05-22 DIAGNOSIS — L25.5 CONTACT DERMATITIS DUE TO PLANT: Primary | ICD-10-CM

## 2025-05-22 DIAGNOSIS — L30.8 PRURITIC DERMATITIS: ICD-10-CM

## 2025-05-22 RX ORDER — METHYLPREDNISOLONE ACETATE 80 MG/ML
80 INJECTION, SUSPENSION INTRA-ARTICULAR; INTRALESIONAL; INTRAMUSCULAR; SOFT TISSUE ONCE
Status: COMPLETED | OUTPATIENT
Start: 2025-05-22 | End: 2025-05-22

## 2025-05-22 RX ORDER — TRIAMCINOLONE ACETONIDE 5 MG/G
1 OINTMENT TOPICAL 2 TIMES DAILY
Qty: 15 G | Refills: 1 | Status: SHIPPED | OUTPATIENT
Start: 2025-05-22

## 2025-05-22 RX ORDER — DEXAMETHASONE 0.5 MG/1
TABLET ORAL
Qty: 35 TABLET | Refills: 0 | Status: SHIPPED | OUTPATIENT
Start: 2025-05-22

## 2025-05-22 RX ADMIN — METHYLPREDNISOLONE ACETATE 80 MG: 80 INJECTION, SUSPENSION INTRA-ARTICULAR; INTRALESIONAL; INTRAMUSCULAR; SOFT TISSUE at 12:23

## 2025-05-22 NOTE — LETTER
"May 22, 2025     Patient: Ronnie Reveles   YOB: 1980   Date of Visit: 5/22/2025       To Whom It May Concern:    It is my medical opinion that Ronnie Reveles begin work related restrictions/changes in an effort to improve injury sustained through current work related obligations.  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  Most significantly to dominant right hand.  Although complete avoidance of these activities will likely relieve him of worsened symptoms, periodic return for shorter periods may be an option at some point, as long as most acute issue has responded to avoidance.  I will continue to monitor his clinical response to activity changes, and provided further guidance for a trial of periodic return to these activities at future examination in office.    If I can be of further assistance, please do not hesitate to reach out to my office.         Sincerely,        Kentrell Saravia, DO    CC: No Recipients  "

## 2025-05-23 ENCOUNTER — TELEPHONE (OUTPATIENT)
Age: 45
End: 2025-05-23
Payer: COMMERCIAL

## 2025-05-23 NOTE — TELEPHONE ENCOUNTER
"Called and spoke with Bia at the [employer] HR department [phone]. Per her, his restrictions needed to state if he is restricted with \"gripping and grasping.\" I addended the letter in the chart to state what is needed and made the patient aware there is a copy in [employer] and made him aware he can come  a copy himself as well.   "

## 2025-05-28 DIAGNOSIS — F40.01 PANIC DISORDER WITH AGORAPHOBIA, MILD AGORAPHOBIC AVOIDANCE AND MODERATE PANIC ATTACKS: ICD-10-CM

## 2025-05-28 RX ORDER — DIAZEPAM 2 MG/1
3 TABLET ORAL EVERY 12 HOURS PRN
Qty: 60 TABLET | Refills: 2 | Status: CANCELLED | OUTPATIENT
Start: 2025-05-28

## 2025-05-28 NOTE — TELEPHONE ENCOUNTER
Rx Refill Note  Requested Prescriptions      No prescriptions requested or ordered in this encounter      Last office visit with prescribing clinician: 5/22/2025   Last telemedicine visit with prescribing clinician: Visit date not found   Next office visit with prescribing clinician: 10/6/2025                         Would you like a call back once the refill request has been completed: [] Yes [] No    If the office needs to give you a call back, can they leave a voicemail: [] Yes [] No    Anne Pinon MA  05/28/25, 10:19 EDT

## 2025-05-30 DIAGNOSIS — F40.01 PANIC DISORDER WITH AGORAPHOBIA, MILD AGORAPHOBIC AVOIDANCE AND MODERATE PANIC ATTACKS: ICD-10-CM

## 2025-06-02 DIAGNOSIS — F40.01 PANIC DISORDER WITH AGORAPHOBIA, MILD AGORAPHOBIC AVOIDANCE AND MODERATE PANIC ATTACKS: ICD-10-CM

## 2025-06-02 RX ORDER — DIAZEPAM 2 MG/1
TABLET ORAL
Qty: 60 TABLET | Refills: 0 | OUTPATIENT
Start: 2025-06-02

## 2025-06-02 RX ORDER — DIAZEPAM 2 MG/1
3 TABLET ORAL EVERY 12 HOURS PRN
Qty: 60 TABLET | Refills: 2 | Status: SHIPPED | OUTPATIENT
Start: 2025-06-02 | End: 2025-06-03 | Stop reason: SDUPTHER

## 2025-06-03 DIAGNOSIS — F40.01 PANIC DISORDER WITH AGORAPHOBIA, MILD AGORAPHOBIC AVOIDANCE AND MODERATE PANIC ATTACKS: ICD-10-CM

## 2025-06-03 NOTE — TELEPHONE ENCOUNTER
"Caller: Ronnie Reveles \"Maxx\"    Relationship: Self    Best call back number: [phone]     Requested Prescriptions:   Requested Prescriptions     Pending Prescriptions Disp Refills    diazePAM (VALIUM) 2 MG tablet 60 tablet 2     Sig: Take 1.5 tablets by mouth Every 12 (Twelve) Hours As Needed for Anxiety.        Pharmacy where request should be sent: [facility] [address] [place] [address] [phone] [facility] [phone]      Last office visit with prescribing clinician: 5/22/2025   Last telemedicine visit with prescribing clinician: Visit date not found   Next office visit with prescribing clinician: 10/6/2025     Additional details provided by patient: PLEASE RESEND THE REFILL. THE LAST WAS DENIED STATING DUPLICATE REQUEST    Does the patient have less than a 3 day supply:  [x] Yes  OUT FOR A FEW DAYS. SINCE FRIDAY    Manny Contreras Rep   06/03/25 08:40 EDT     "

## 2025-06-05 RX ORDER — DIAZEPAM 2 MG/1
3 TABLET ORAL EVERY 12 HOURS PRN
Qty: 60 TABLET | Refills: 2 | Status: SHIPPED | OUTPATIENT
Start: 2025-06-05

## 2025-06-06 NOTE — PROGRESS NOTES
Established Patient        Chief Complaint:   Chief Complaint   Patient presents with    Rash     Discuss paperwork        Ronnie Reveles is a 45 y.o. male    History of Present Illness:   Here today for evaluation of pruritic dermatitis that has developed to upper extremities, torso and lower legs.  Patient reports a known exposure to poison ivy/oak, this preceded the development of his rash by 1 to 2 days.  The areas of involvement have spread, intensely pruritic.  His symptoms have not responded to over-the-counter preparations.    He denies dyspnea, chest pain, syncope or palpitations.  Denies fever, chills or night sweats.  Tolerating all nutritional intake, reports good hydration habits.      Subjective     The following portions of the patient's history were reviewed and updated as appropriate: allergies, current medications, past family history, past medical history, past social history, past surgical history and problem list.    Allergies   Allergen Reactions    Atorvastatin Myalgia    Fenofibrate Rash    Ibuprofen GI Intolerance    Sulfa Antibiotics Hives    Tramadol Hives    Zoloft [Sertraline] Irritability       Review of Systems  Constitutional: Negative for fever. Negative for chills, diaphoresis; malaise/fatigue as per above.  HENT: No dysphagia; no changes to vision/hearing/smell/taste; no epistaxis.  Otherwise, as per above.  Eyes: Negative for redness and visual disturbance.   Respiratory: negative for shortness of breath. Negative for chest pain.  Cough productive of clear/yellow phlegm at times.  No hemoptysis.  Cardiovascular: Negative for chest pain and palpitations.   Gastrointestinal: Denies BRB/BTS.  Endocrine: Negative for cold intolerance and heat intolerance.   Genitourinary: Negative for difficulty urinating, dysuria and frequency.  Decreased libido.  Musculoskeletal: Paroxysmal spastic torticollis.  Diffuse arthralgias and myalgias.  Discomfort to the ulnar  "aspect of the right wrist, ulnar deviation of hand worsens discomfort.  Decreased  strength bilateral hands, most noticeable to right.  Skin: Linear/vesicular erythema and rash to upper extremities, torso and lower legs.  Neurological: Negative for syncope, weakness; chronic migraine headache disorder.  Paresthesias to bilateral hands, symmetric.  Hematological: Negative for adenopathy. Does not bruise/bleed easily.   Psychiatric/Behavioral: Negative for confusion.  Generalized nervousness as per above.    Objective     Physical Exam   Vital Signs: /80   Pulse 101   Ht 167.6 cm (66\")   Wt 82.1 kg (181 lb)   SpO2 98%   BMI 29.21 kg/m²   BMI is >= 30 and <35. (Class 1 Obesity). The following options were offered after discussion;: exercise counseling/recommendations and nutrition counseling/recommendations  Patient's (Body mass index is 29.21 kg/m².) indicates that they are obese (BMI >30) with health related conditions that include hypertension, dyslipidemias, and GERD . Weight is improving with lifestyle modifications. BMI is is above average; BMI management plan is completed. We discussed low calorie, low carb based diet program, portion control, increasing exercise, joining a fitness center or start home based exercise program, and Weight Watchers or other Commercial based weight reduction program.      General Appearance: alert, oriented x 3, no acute distress.  Skin: warm and dry.  Linear/vesicular dermatitis to upper extremities, torso and lower legs.  Surrounding erythema, signs of excoriation noted.  HEENT: Atraumatic.  pupils round and reactive to light and accommodation, oral mucosa pink and moist.  Nares patent without epistaxis.  External auditory canals are patent tympanic membranes intact.  Neck: supple, no JVD, trachea midline.  No thyromegaly.  There is spasticity noted to the paravertebral musculature of the cervical spine, limiting all aspects of ROM testing.  Lungs: Rhonchus, " referred, upper airway congestion is cleared with light cough, unlabored breathing effort.  Heart: RRR, normal S1 and S2, no S3, no rub.  Abdomen: soft, non-tender, no palpable bladder, present bowel sounds to auscultation ×4.  No guarding or rigidity.  No CVA tenderness.  Extremities: no clubbing, cyanosis or edema.  Good range of motion actively and passively.  Decreased  strength bilaterally, more profound to the right.  Mild tenderness on palpation of right ulnar crease.  Without any findings of joint instability.  No palmar atrophy noted.  Neuro: normal speech and mental status.  Cranial nerves II through XII intact.  No anosmia. DTR 2+; proprioception intact.  No focal motor/sensory deficits.        Assessment and Plan      Assessment/Plan:   Diagnoses and all orders for this visit:    1. Contact dermatitis due to plant (Primary)  -     methylPREDNISolone acetate (DEPO-medrol) injection 80 mg  -     dexAMETHasone (DECADRON) 0.5 MG tablet; 6 tab po x1d; then 5 tab po x 2d; then 4 tab po x 2d; then 3 tab po x 2d; then 2 tab po x 2d; then 1 po x1d; then STOP  Dispense: 35 tablet; Refill: 0  -     triamcinolone (KENALOG) 0.5 % ointment; Apply 1 Application topically to the appropriate area as directed 2 (Two) Times a Day.  Dispense: 15 g; Refill: 1    2. Pruritic dermatitis  -     methylPREDNISolone acetate (DEPO-medrol) injection 80 mg  -     dexAMETHasone (DECADRON) 0.5 MG tablet; 6 tab po x1d; then 5 tab po x 2d; then 4 tab po x 2d; then 3 tab po x 2d; then 2 tab po x 2d; then 1 po x1d; then STOP  Dispense: 35 tablet; Refill: 0  -     triamcinolone (KENALOG) 0.5 % ointment; Apply 1 Application topically to the appropriate area as directed 2 (Two) Times a Day.  Dispense: 15 g; Refill: 1      Planned application of triamcinolone ointment to affected areas.  Patient has known exposure to poison ivy/oak recently, preceded the development of the rash by approximately 1 to 2 days.    Patient also provided an 80  mg IM injection of Depo-Medrol today due to the diffuse nature of the affected area, as well as provided with a 10-day low-dose dexamethasone taper.  Patient verbalized understanding of the medications, as well as other supportive activities including cool showers/bathing, avoidance of heat restrictive clothing.        Discussion Summary:    Discussed plan of care in detail with pt today; pt verb understanding and agrees.    I spent 25 minutes caring for Ronnie on this date of service. This time includes time spent by me in the following activities:preparing for the visit, performing a medically appropriate examination and/or evaluation , counseling and educating the patient/family/caregiver, ordering medications, tests, or procedures, documenting information in the medical record, and care coordination    I confirm accuracy of unchanged data/findings which have been carried forward from previous visit, as well as I have updated appropriately those that have changed.      Follow up:  No follow-ups on file.     There are no Patient Instructions on file for this visit.    Kentrell Saravia DO  06/06/25  13:53 EDT

## 2025-06-18 ENCOUNTER — PROCEDURE VISIT (OUTPATIENT)
Dept: ORTHOPEDIC SURGERY | Facility: CLINIC | Age: 45
End: 2025-06-18
Payer: COMMERCIAL

## 2025-06-18 DIAGNOSIS — G56.03 CARPAL TUNNEL SYNDROME, BILATERAL: Primary | ICD-10-CM

## 2025-06-18 DIAGNOSIS — R29.898 DECREASED GRIP STRENGTH OF RIGHT HAND: ICD-10-CM

## 2025-06-18 DIAGNOSIS — M79.641 RIGHT HAND PAIN: ICD-10-CM

## 2025-06-18 DIAGNOSIS — R20.2 RIGHT HAND PARESTHESIA: ICD-10-CM

## 2025-07-03 ENCOUNTER — TELEPHONE (OUTPATIENT)
Age: 45
End: 2025-07-03
Payer: COMMERCIAL

## 2025-07-03 NOTE — TELEPHONE ENCOUNTER
Caller: CHAYITO CAMARENA    Relationship: Emergency Contact    Best call back number: [phone]     Caller requesting test results: WIFE    What test was performed: NERVE CONDUCTION    When was the test performed: 6/18/25    Where was the test performed: [facility]    Additional notes: PHONE CALL PLEASE

## 2025-07-04 DIAGNOSIS — F40.01 PANIC DISORDER WITH AGORAPHOBIA, MILD AGORAPHOBIC AVOIDANCE AND MODERATE PANIC ATTACKS: ICD-10-CM

## 2025-07-04 DIAGNOSIS — G43.109 MIGRAINE WITH AURA AND WITHOUT STATUS MIGRAINOSUS, NOT INTRACTABLE: ICD-10-CM

## 2025-07-07 RX ORDER — VENLAFAXINE HYDROCHLORIDE 150 MG/1
150 CAPSULE, EXTENDED RELEASE ORAL DAILY
Qty: 90 CAPSULE | Refills: 0 | Status: SHIPPED | OUTPATIENT
Start: 2025-07-07

## 2025-07-07 NOTE — TELEPHONE ENCOUNTER
Rx Refill Note  Requested Prescriptions     Pending Prescriptions Disp Refills    venlafaxine XR (EFFEXOR-XR) 150 MG 24 hr capsule [Pharmacy Med Name: Venlafaxine HCl  MG Oral Capsule Extended Release 24 Hour] 90 capsule 0     Sig: Take 1 capsule by mouth once daily      Last office visit with prescribing clinician: 5/22/2025   Last telemedicine visit with prescribing clinician: Visit date not found   Next office visit with prescribing clinician: 7/15/2025                         Would you like a call back once the refill request has been completed: [] Yes [] No    If the office needs to give you a call back, can they leave a voicemail: [] Yes [] No    Marlin Nelson MA  07/07/25, 10:05 EDT

## 2025-07-15 ENCOUNTER — OFFICE VISIT (OUTPATIENT)
Age: 45
End: 2025-07-15
Payer: COMMERCIAL

## 2025-07-15 VITALS
OXYGEN SATURATION: 98 % | DIASTOLIC BLOOD PRESSURE: 100 MMHG | HEIGHT: 66 IN | SYSTOLIC BLOOD PRESSURE: 140 MMHG | WEIGHT: 185 LBS | HEART RATE: 89 BPM | BODY MASS INDEX: 29.73 KG/M2

## 2025-07-15 DIAGNOSIS — R20.2 RIGHT HAND PARESTHESIA: ICD-10-CM

## 2025-07-15 DIAGNOSIS — G56.01 CARPAL TUNNEL SYNDROME OF RIGHT WRIST: ICD-10-CM

## 2025-07-15 DIAGNOSIS — G56.11 MEDIAN NEUROPATHY, RIGHT: Primary | ICD-10-CM

## 2025-07-15 DIAGNOSIS — R29.898 DECREASED GRIP STRENGTH OF RIGHT HAND: ICD-10-CM

## 2025-07-15 DIAGNOSIS — J01.01 RECURRENT MAXILLARY SINUSITIS: ICD-10-CM

## 2025-07-15 DIAGNOSIS — I10 ESSENTIAL HYPERTENSION: ICD-10-CM

## 2025-07-15 PROCEDURE — 99214 OFFICE O/P EST MOD 30 MIN: CPT | Performed by: FAMILY MEDICINE

## 2025-07-15 RX ORDER — DEXAMETHASONE 0.5 MG/1
TABLET ORAL
Qty: 21 TABLET | Refills: 0 | Status: SHIPPED | OUTPATIENT
Start: 2025-07-15 | End: 2025-08-04

## 2025-07-15 RX ORDER — CEFDINIR 300 MG/1
300 CAPSULE ORAL 2 TIMES DAILY
Qty: 14 CAPSULE | Refills: 0 | Status: SHIPPED | OUTPATIENT
Start: 2025-07-15 | End: 2025-08-04

## 2025-07-15 RX ORDER — HYDROCODONE BITARTRATE AND ACETAMINOPHEN 5; 325 MG/1; MG/1
1 TABLET ORAL EVERY 6 HOURS PRN
Qty: 30 TABLET | Refills: 0 | Status: SHIPPED | OUTPATIENT
Start: 2025-07-15 | End: 2025-08-04 | Stop reason: SDUPTHER

## 2025-07-15 RX ORDER — GABAPENTIN 100 MG/1
100 CAPSULE ORAL 3 TIMES DAILY
Qty: 60 CAPSULE | Refills: 1 | Status: SHIPPED | OUTPATIENT
Start: 2025-07-15

## 2025-07-16 ENCOUNTER — TELEPHONE (OUTPATIENT)
Age: 45
End: 2025-07-16
Payer: COMMERCIAL

## 2025-07-16 NOTE — TELEPHONE ENCOUNTER
[facility] CALLED ABOUT A DRUG INTERACTION WITH THIS PT OF NOT BEING ABLE TO TAKE BOTH FOR DEPRESSION OF DIAZEPAM AND NORCO? IF THEY CAN GET APPROVAL FROM DR CRISTINA AND IF YES, THEY CAN OFFER NORCO? PLEASE ADVISE AND CALL BACK HILDA 7/17 [phone]

## 2025-07-17 ENCOUNTER — TELEPHONE (OUTPATIENT)
Age: 45
End: 2025-07-17
Payer: COMMERCIAL

## 2025-07-17 NOTE — TELEPHONE ENCOUNTER
Called and made patient aware that the messages have been sent to Dr Saravia and I will call him as soon as I have an answer for him. Patient voiced understanding.

## 2025-07-17 NOTE — TELEPHONE ENCOUNTER
PT HAS CALLED IN TO SEE WHAT IS GOING ON WITH PRESCRIPTION THAT NEEDS AN APPROVAL TO [facility] IN [place] DUE TO INSURANCE PURPOSES. PLEASE ADVISE.

## 2025-07-17 NOTE — TELEPHONE ENCOUNTER
[facility] IN [place] CALLED BACK AGAIN TODAY AND SAID THE PT WAS BACK AND NEEDING HIS PRESCRIPTION BUT THE PHARMACIST SAID THEY REALLY NEED DR CRISTINA APPROVAL BEFORE THEY CAN DO IT DUE TO INSURANCE, PLEASE ADVISE AND SHE WOULD LIKE A CALL BACK TO [facility] IN [place] @[phone]. HER NAME IS RESHA IF AT ALL POSSIBLE THAT YOU CAN GET IN TOUCH WITH IBETH FOR AN ANSWER TO THIS QUESTION.

## 2025-07-21 ENCOUNTER — OFFICE VISIT (OUTPATIENT)
Age: 45
End: 2025-07-21
Payer: COMMERCIAL

## 2025-07-21 VITALS
SYSTOLIC BLOOD PRESSURE: 128 MMHG | HEIGHT: 66 IN | DIASTOLIC BLOOD PRESSURE: 92 MMHG | WEIGHT: 187 LBS | BODY MASS INDEX: 30.05 KG/M2

## 2025-07-21 DIAGNOSIS — M65.331 TRIGGER MIDDLE FINGER OF RIGHT HAND: ICD-10-CM

## 2025-07-21 DIAGNOSIS — M65.341 TRIGGER RING FINGER OF RIGHT HAND: ICD-10-CM

## 2025-07-21 DIAGNOSIS — G56.01 CARPAL TUNNEL SYNDROME OF RIGHT WRIST: Primary | ICD-10-CM

## 2025-07-21 PROCEDURE — 99214 OFFICE O/P EST MOD 30 MIN: CPT | Performed by: PLASTIC SURGERY

## 2025-07-21 PROCEDURE — 20550 NJX 1 TENDON SHEATH/LIGAMENT: CPT | Performed by: PLASTIC SURGERY

## 2025-07-21 PROCEDURE — 20526 THER INJECTION CARP TUNNEL: CPT | Performed by: PLASTIC SURGERY

## 2025-07-21 RX ORDER — LIDOCAINE HYDROCHLORIDE 10 MG/ML
0.5 INJECTION, SOLUTION EPIDURAL; INFILTRATION; INTRACAUDAL; PERINEURAL
Status: COMPLETED | OUTPATIENT
Start: 2025-07-21 | End: 2025-07-21

## 2025-07-21 RX ORDER — DEXAMETHASONE SODIUM PHOSPHATE 4 MG/ML
2 INJECTION, SOLUTION INTRA-ARTICULAR; INTRALESIONAL; INTRAMUSCULAR; INTRAVENOUS; SOFT TISSUE
Status: COMPLETED | OUTPATIENT
Start: 2025-07-21 | End: 2025-07-21

## 2025-07-21 RX ADMIN — DEXAMETHASONE SODIUM PHOSPHATE 2 MG: 4 INJECTION, SOLUTION INTRA-ARTICULAR; INTRALESIONAL; INTRAMUSCULAR; INTRAVENOUS; SOFT TISSUE at 08:57

## 2025-07-21 RX ADMIN — LIDOCAINE HYDROCHLORIDE 0.5 ML: 10 INJECTION, SOLUTION EPIDURAL; INFILTRATION; INTRACAUDAL; PERINEURAL at 08:56

## 2025-07-21 RX ADMIN — LIDOCAINE HYDROCHLORIDE 0.5 ML: 10 INJECTION, SOLUTION EPIDURAL; INFILTRATION; INTRACAUDAL; PERINEURAL at 08:57

## 2025-07-21 RX ADMIN — DEXAMETHASONE SODIUM PHOSPHATE 2 MG: 4 INJECTION, SOLUTION INTRA-ARTICULAR; INTRALESIONAL; INTRAMUSCULAR; INTRAVENOUS; SOFT TISSUE at 08:55

## 2025-07-21 RX ADMIN — LIDOCAINE HYDROCHLORIDE 0.5 ML: 10 INJECTION, SOLUTION EPIDURAL; INFILTRATION; INTRACAUDAL; PERINEURAL at 08:55

## 2025-07-21 RX ADMIN — DEXAMETHASONE SODIUM PHOSPHATE 2 MG: 4 INJECTION, SOLUTION INTRA-ARTICULAR; INTRALESIONAL; INTRAMUSCULAR; INTRAVENOUS; SOFT TISSUE at 08:56

## 2025-07-21 NOTE — TELEPHONE ENCOUNTER
PTS wife called and wanting to get this resolved today so [facility] will release Ronnie medication Norco approval. Please advise asap.

## 2025-07-21 NOTE — PROGRESS NOTES
Procedure   - Hand/Upper Extremity Injection: R carpal tunnel for carpal tunnel syndrome on 7/21/2025 8:55 AM  Indications: pain  Details: 27 G needle, volar approach  Medications: 0.5 mL lidocaine PF 1% 1 %; 2 mg dexAMETHasone 4 MG/ML  Outcome: tolerated well, no immediate complications  Procedure, treatment alternatives, risks and benefits explained, specific risks discussed. Consent was given by the patient. Immediately prior to procedure a time out was called to verify the correct patient, procedure, equipment, support staff and site/side marked as required. Patient was prepped and draped in the usual sterile fashion.       - Hand/Upper Extremity Injection: R long A1 for trigger finger on 7/21/2025 8:56 AM  Indications: pain  Details: 30 G (short) needle, volar approach  Medications: 2 mg dexAMETHasone 4 MG/ML; 0.5 mL lidocaine PF 1% 1 %  Outcome: tolerated well, no immediate complications  Procedure, treatment alternatives, risks and benefits explained, specific risks discussed. Consent was given by the patient. Immediately prior to procedure a time out was called to verify the correct patient, procedure, equipment, support staff and site/side marked as required. Patient was prepped and draped in the usual sterile fashion.       - Hand/Upper Extremity Injection: R ring A1 for trigger finger on 7/21/2025 8:57 AM  Indications: pain  Details: 30 G (short) needle, volar approach  Medications: 2 mg dexAMETHasone 4 MG/ML; 0.5 mL lidocaine PF 1% 1 %  Outcome: tolerated well, no immediate complications  Procedure, treatment alternatives, risks and benefits explained, specific risks discussed. Consent was given by the patient. Immediately prior to procedure a time out was called to verify the correct patient, procedure, equipment, support staff and site/side marked as required. Patient was prepped and draped in the usual sterile fashion.

## 2025-07-21 NOTE — PROGRESS NOTES
[facility]opedic     Office Visit       Date: 07/21/2025   Patient Name: Ronnie Reveles  MRN: [id number]  YOB: 1980    Referring Physician: Kentrell Saravia DO     Chief Complaint:   Chief Complaint   Patient presents with    Right Hand - Pain       History of Present Illness:   Ronnie Reveles is a 45 y.o. male right-hand-dominant presents with right hand and wrist pain of 6 months duration.  He reports wrist pain that radiates into his thumb index middle and ring finger.  Reports associated numbness and tingling is worse at night.  He also reports pain catching and locking of his right middle and ring finger.  Reports symptoms are exacerbated by work.  Has tried bracing and anti-inflammatories with minimal improvement.  He works doing factory line work.  He denies steroid smoke but does vape daily.  He has no other relevant medical history.      Subjective   Review of Systems:   Review of Systems   Constitutional:  Negative for chills, fever, unexpected weight gain and unexpected weight loss.   HENT:  Negative for congestion, postnasal drip and rhinorrhea.    Eyes:  Negative for blurred vision.   Respiratory:  Negative for shortness of breath.    Cardiovascular:  Negative for leg swelling.   Gastrointestinal:  Negative for abdominal pain, nausea and vomiting.   Genitourinary:  Negative for difficulty urinating.   Musculoskeletal:  Positive for arthralgias. Negative for gait problem, joint swelling and myalgias.   Skin:  Negative for skin lesions and wound.   Neurological:  Negative for dizziness, weakness, light-headedness and numbness.   Hematological:  Does not bruise/bleed easily.   Psychiatric/Behavioral:  Negative for depressed mood.    All other systems reviewed and are negative.       Pertinent review of systems per HPI.     I reviewed the patient's chief complaint, history of present illness, review of systems, past  "medical history, surgical history, family history, social history, medications and allergy list in the EMR on 07/21/2025 and agree with the findings above.    Objective    Vital Signs:   Vitals:    07/21/25 0838   BP: 128/92   Weight: 84.8 kg (187 lb)   Height: 167.6 cm (66\")     BMI: Body mass index is 30.18 kg/m².    General Appearance: No acute distress. Alert and oriented.     Chest:  Non-labored breathing on room air. Regular rate and rhythm.    Upper Extremity Exam:    Positive Tinel at the right carpal tunnel.  Positive right carpal compression test.  Negative Tinel right cubital tunnel.  Negative elbow flexion compression test.  No thenar wasting.  No intrinsic wasting.  Tender palpation of the right middle and ring finger A1 pulleys with palpable nodules.  Catching without locking with flexion at the PIP joints of the fingers.    Fingers are warm, well-perfused with appropriate capillary refill.  Palpable radial pulse.    Sensation intact to light touch in median, radial and ulnar nerve distributions.    Motor- Fires [employer], ulnar intrinsics, EPL/EDC w/ full active and passive range of motion. Strength intact.    Non-tender except for in the areas highlighted    Imaging/Studies:   Imaging Results (Last 24 Hours)       ** No results found for the last 24 hours. **            EMG nerve conduction studies from 6/13/2025) reviewed and interpreted myself demonstrate evidence of mild to moderate right carpal tunnel syndrome.    Procedures:  Procedures    Quality Measures:   ACP:   ACP discussion was deferred.    Tobacco:   Ronnie Reveles  reports that he has quit smoking. His smoking use included cigarettes. He started smoking about 41 years ago. He has a 41.5 pack-year smoking history. He has been exposed to tobacco smoke. He has never used smokeless tobacco.      Assessment / Plan    Assessment/Plan:     There are no diagnoses linked to this encounter.     Ronnie Revelesis a 45 y.o. male who presents with:     "  ICD-10-CM ICD-9-CM   1. Carpal tunnel syndrome of right wrist  G56.01 354.0   2. Trigger middle finger of right hand  M65.331 727.03   3. Trigger ring finger of right hand  M65.341 727.03         Patient presents with right carpal tunnel syndrome as well as right middle and ring finger trigger fingers.  We discussed his diagnosis as well as treatment options going bracing anti-inflammatories corticosteroid injection and surgery in the form of carpal tunnel and trigger finger release.  Recommend a trial of nonoperative treatment with right carpal tunnel injection and right middle and ring finger trigger injection.  Recommend patient continue work restrictions for additional 3 weeks with no line work.  Recommend patient follow-up with me as needed if symptoms persist or worsen    Procedure Note:    I discussed with the patient the potential benefits of performing therapeutic aspiration and injections as well as potential risks including but not limited to infection, swelling, pain, bleeding, bruising, nerve/vessel damage, skin color changes, transient elevation in blood glucose levels, and fat atrophy. After informed consent and after the areas were prepped with chlorhexadine soap, ethyl chloride was used to numb the skin. 0.5 mL of 1% lidocaine was injected followed by injection of 2 mg of dexamethasone into the A1 pulley of the right middle and ring fingers.  The patient tolerated the procedure well. There were no complications. A sterile dressing was placed over the injection sites.    Procedure Note- Carpal Tunnel Injection:    I discussed with the patient the potential benefits of performing therapeutic injections as well as potential risks including but not limited to infection, swelling, pain, bleeding, bruising, nerve/vessel damage, skin color changes, transient elevation in blood glucose levels, and fat atrophy. After informed consent and after the areas were prepped with chlorhexadine soap, ethyl chloride  was used to numb the skin. The palmaris as a landmark, 2 mg of dexamethasone and 0.5 cc of 1% lidocaine was injected into the right carpal tunnel, taking care to avoid injecting directly into the median nerve.  The patient tolerated the procedure well. There were no complications. A sterile dressing was placed over the injection sites.        Follow Up:   Return if symptoms worsen or fail to improve.        Sandeep Mccormick MD  [facility] [place] Hand [facility] Upper Extremity Surgeon

## 2025-07-28 ENCOUNTER — TELEPHONE (OUTPATIENT)
Age: 45
End: 2025-07-28

## 2025-07-28 ENCOUNTER — TELEPHONE (OUTPATIENT)
Dept: ORTHOPEDIC SURGERY | Facility: CLINIC | Age: 45
End: 2025-07-28
Payer: COMMERCIAL

## 2025-07-28 NOTE — TELEPHONE ENCOUNTER
Caller: LOS WYATT    Relationship: NURSE  (KYLE CONNOLLY)     Best call back number: [phone]    What form or medical record are you requesting: OFFICE NOTE AND WORK STATUS FROM 7-21-25    Who is requesting this form or medical record from you: EMPLOYER     How would you like to receive the form or medical records (pick-up, mail, fax): FAX  If fax, what is the fax number: [phone]    Timeframe paperwork needed: ASAP     Additional notes: N/A

## 2025-07-30 NOTE — PROGRESS NOTES
Established Patient        Chief Complaint:   Chief Complaint   Patient presents with    Hand Pain        Ronnie Reveles is a 45 y.o. male    History of Present Illness:   Here today in scheduled follow-up visit of recently diagnosed median neuropathy of the right hand, a result of carpal tunnel syndrome of the right wrist.  Continues to be bothered by decreased  strength, to bilateral hands, but is noticeably worse in the right.  It affects most aspects of his work-related obligations, as well as those of generalized ADLs.  Does seem to be worsened with highly repetitive activities as related to work obligations.  Denies any episode of trauma or injury otherwise.    Denies chest pain, syncope, palpitations or vertigo.  Denies fever, chills or night sweats.  Maintains an active lifestyle, balanced dietary intake; reports good hydration habits.  Denies hematuria/dysuria.  Denies any BRB/BTS.  No new rashes.  Denies orthopnea, epistaxis or hemoptysis.  He does report worsening frontal facial pressure and heavy nasal congestion.  Has a history of recurrent maxillary sinusitis, patient states symptoms have acutely worsened to similar episodes.      Subjective     The following portions of the patient's history were reviewed and updated as appropriate: allergies, current medications, past family history, past medical history, past social history, past surgical history and problem list.    Allergies   Allergen Reactions    Atorvastatin Myalgia    Fenofibrate Rash    Ibuprofen GI Intolerance    Sulfa Antibiotics Hives    Tramadol Hives    Zoloft [Sertraline] Irritability       Review of Systems  Constitutional: Negative for fever. Negative for chills, diaphoresis; malaise/fatigue as per above.  HENT: No dysphagia; no changes to vision/hearing/smell/taste; no epistaxis.  Otherwise, as per above.  Eyes: Negative for redness and visual disturbance.   Respiratory: negative for shortness of breath.  "Negative for chest pain.  Cough productive of clear/yellow phlegm at times.  No hemoptysis.  Cardiovascular: Negative for chest pain and palpitations.   Gastrointestinal: Denies BRB/BTS.  Endocrine: Negative for cold intolerance and heat intolerance.   Genitourinary: Negative for difficulty urinating, dysuria and frequency.  Decreased libido.  Musculoskeletal: Paroxysmal spastic torticollis.  Diffuse arthralgias and myalgias.  Discomfort to the ulnar aspect of the right wrist, ulnar deviation of hand worsens discomfort.  Decreased  strength bilateral hands, most noticeable to right.  Skin: Erythema to face/neck.  Neurological: Negative for syncope, weakness; chronic migraine headache disorder.  Paresthesias to bilateral hands, symmetric.  Hematological: Negative for adenopathy. Does not bruise/bleed easily.   Psychiatric/Behavioral: Negative for confusion.  Generalized nervousness as per above.    Objective     Physical Exam   Vital Signs: /100   Pulse 89   Ht 167.6 cm (66\")   Wt 83.9 kg (185 lb)   SpO2 98%   BMI 29.86 kg/m²   BMI is >= 30 and <35. (Class 1 Obesity). The following options were offered after discussion;: exercise counseling/recommendations and nutrition counseling/recommendations  Patient's (Body mass index is 29.86 kg/m².) indicates that they are obese (BMI >30) with health related conditions that include hypertension, dyslipidemias, and GERD . Weight is improving with lifestyle modifications. BMI is is above average; BMI management plan is completed. We discussed low calorie, low carb based diet program, portion control, increasing exercise, joining a fitness center or start home based exercise program, and Weight Watchers or other Commercial based weight reduction program.      General Appearance: alert, oriented x 3, no acute distress.  Skin: warm and dry.  Flushing to face/neck.  HEENT: Atraumatic.  pupils round and reactive to light and accommodation, oral mucosa pink and moist.  " Nares patent without epistaxis.  External auditory canals are patent tympanic membranes intact.  Boggy/inflamed nasal turbinates, tenderness on palpation of the maxillary sinuses.  Moderate postnasal drainage without pustules or exudate.  Neck: supple, no JVD, trachea midline.  No thyromegaly.  There is spasticity noted to the paravertebral musculature of the cervical spine, limiting all aspects of ROM testing.  Lungs: Rhonchus, referred, upper airway congestion is cleared with light cough, unlabored breathing effort.  Heart: RRR, normal S1 and S2, no S3, no rub.  Abdomen: soft, non-tender, no palpable bladder, present bowel sounds to auscultation ×4.  No guarding or rigidity.  No CVA tenderness.  Extremities: no clubbing, cyanosis or edema.  Good range of motion actively and passively.  Decreased  strength bilaterally, more profound to the right.  Mild tenderness on palpation of right ulnar crease.  Without any findings of joint instability.  No palmar atrophy noted.  Neuro: normal speech and mental status.  Cranial nerves II through XII intact.  No anosmia. DTR 2+; proprioception intact.  No focal motor/sensory deficits.        Assessment and Plan      Assessment/Plan:   Diagnoses and all orders for this visit:    1. Median neuropathy, right (Primary)  -     Ambulatory Referral to Hand Surgery  -     gabapentin (NEURONTIN) 100 MG capsule; Take 1 capsule by mouth 3 (Three) Times a Day.  Dispense: 60 capsule; Refill: 1  -     dexAMETHasone (DECADRON) 0.5 MG tablet; 6 tab po x 1d; then 5 tab po x 1d; then 4 tab po x 1d; then 3 tab po x 1d; then 2 tab po x 1d; then 1 tab po x 1d; then STOP  Dispense: 21 tablet; Refill: 0  -     HYDROcodone-acetaminophen (NORCO) 5-325 MG per tablet; Take 1 tablet by mouth Every 6 (Six) Hours As Needed for Moderate Pain.  Dispense: 30 tablet; Refill: 0    2. Carpal tunnel syndrome of right wrist  -     Ambulatory Referral to Hand Surgery  -     gabapentin (NEURONTIN) 100 MG capsule;  Take 1 capsule by mouth 3 (Three) Times a Day.  Dispense: 60 capsule; Refill: 1    3. Decreased  strength of right hand  -     Ambulatory Referral to Hand Surgery    4. Right hand paresthesia  -     Ambulatory Referral to Hand Surgery    5. Recurrent maxillary sinusitis  -     cefdinir (OMNICEF) 300 MG capsule; Take 1 capsule by mouth 2 (Two) Times a Day.  Dispense: 14 capsule; Refill: 0    6. Essential hypertension      No signs demonstrate hemodynamic stability, however his blood pressure is not at goal.  Have asked that he continue to monitor routinely at home, keep a log of those results and bring with him to follow-up visit.  Discussed need for reduction in sodium/salt/caffeine intake; improve sleep habits as able; inc formal CV exercise program with goal of vigorous activity most, if not all, days of the week; goal of 150 min of sustained HR CV exercise total per week.    Placed a referral to be seen by hand surgeon concerning severe carpal tunnel syndrome of the right wrist, patient is right arm dominant and symptoms impair his ability to perform work-related obligations, as well as most ADLs.  Also affecting his ability to sleep soundly at night.  He has failed therapies of home exercise program, as well as bracing.    I have provided patient with a short course of as needed analgesia concerning the painful paresthesia of his right hand.  He is also provided with a low-dose 6-day oral dexamethasone taper.    He has done well with use of cefdinir in past for his maxillary sinusitis recurrence.  He is to notify the office should his symptoms not resolved.    Discussion Summary:    Discussed plan of care in detail with pt today; pt verb understanding and agrees.    I spent 35 minutes caring for Ronnie on this date of service. This time includes time spent by me in the following activities:preparing for the visit, reviewing tests, performing a medically appropriate examination and/or evaluation , counseling  and educating the patient/family/caregiver, ordering medications, tests, or procedures, referring and communicating with other health care professionals , documenting information in the medical record, and care coordination    I confirm accuracy of unchanged data/findings which have been carried forward from previous visit, as well as I have updated appropriately those that have changed.      Follow up:  No follow-ups on file.     There are no Patient Instructions on file for this visit.    Kentrell Saravia DO  07/30/25  13:44 EDT

## 2025-07-31 DIAGNOSIS — F40.01 PANIC DISORDER WITH AGORAPHOBIA, MILD AGORAPHOBIC AVOIDANCE AND MODERATE PANIC ATTACKS: ICD-10-CM

## 2025-07-31 NOTE — TELEPHONE ENCOUNTER
Caller: CHAYITO CAMARENA    Relationship: Emergency Contact    Best call back number: [phone]    Requested Prescriptions:   Requested Prescriptions     Pending Prescriptions Disp Refills    diazePAM (VALIUM) 2 MG tablet 60 tablet 2     Sig: Take 1.5 tablets by mouth Every 12 (Twelve) Hours As Needed for Anxiety.        Pharmacy where request should be sent: [facility] [address] [place] [address] [phone] [facility] [phone] FX     Last office visit with prescribing clinician: 7/15/2025   Last telemedicine visit with prescribing clinician: Visit date not found   Next office visit with prescribing clinician: Visit date not found     Additional details provided by patient:     Does the patient have less than a 3 day supply:  [] Yes  [x] No    Would you like a call back once the refill request has been completed: [] Yes [x] No    If the office needs to give you a call back, can they leave a voicemail: [] Yes [x] No    Manny Branham Rep   07/31/25 08:49 EDT

## 2025-08-01 RX ORDER — DIAZEPAM 2 MG/1
3 TABLET ORAL EVERY 12 HOURS PRN
Qty: 60 TABLET | Refills: 2 | Status: SHIPPED | OUTPATIENT
Start: 2025-08-01

## 2025-08-04 ENCOUNTER — OFFICE VISIT (OUTPATIENT)
Age: 45
End: 2025-08-04
Payer: COMMERCIAL

## 2025-08-04 VITALS
OXYGEN SATURATION: 95 % | HEIGHT: 66 IN | BODY MASS INDEX: 29.89 KG/M2 | WEIGHT: 186 LBS | DIASTOLIC BLOOD PRESSURE: 84 MMHG | HEART RATE: 95 BPM | SYSTOLIC BLOOD PRESSURE: 112 MMHG

## 2025-08-04 DIAGNOSIS — G56.11 MEDIAN NEUROPATHY, RIGHT: ICD-10-CM

## 2025-08-04 DIAGNOSIS — R20.2 RIGHT HAND PARESTHESIA: Primary | ICD-10-CM

## 2025-08-04 DIAGNOSIS — F43.23 ADJUSTMENT DISORDER WITH MIXED ANXIETY AND DEPRESSED MOOD: ICD-10-CM

## 2025-08-04 DIAGNOSIS — G56.01 CARPAL TUNNEL SYNDROME OF RIGHT WRIST: ICD-10-CM

## 2025-08-04 DIAGNOSIS — R29.898 DECREASED GRIP STRENGTH OF RIGHT HAND: ICD-10-CM

## 2025-08-04 PROCEDURE — 99214 OFFICE O/P EST MOD 30 MIN: CPT | Performed by: FAMILY MEDICINE

## 2025-08-04 RX ORDER — HYDROCODONE BITARTRATE AND ACETAMINOPHEN 5; 325 MG/1; MG/1
1 TABLET ORAL EVERY 6 HOURS PRN
Qty: 30 TABLET | Refills: 0 | Status: SHIPPED | OUTPATIENT
Start: 2025-08-04 | End: 2025-08-14 | Stop reason: SDUPTHER

## 2025-08-07 ENCOUNTER — OFFICE VISIT (OUTPATIENT)
Age: 45
End: 2025-08-07
Payer: COMMERCIAL

## 2025-08-07 VITALS
BODY MASS INDEX: 29.89 KG/M2 | DIASTOLIC BLOOD PRESSURE: 104 MMHG | WEIGHT: 186 LBS | SYSTOLIC BLOOD PRESSURE: 142 MMHG | HEIGHT: 66 IN

## 2025-08-07 DIAGNOSIS — M65.331 TRIGGER MIDDLE FINGER OF RIGHT HAND: ICD-10-CM

## 2025-08-07 DIAGNOSIS — M65.341 TRIGGER RING FINGER OF RIGHT HAND: ICD-10-CM

## 2025-08-07 DIAGNOSIS — G56.01 CARPAL TUNNEL SYNDROME OF RIGHT WRIST: Primary | ICD-10-CM

## 2025-08-14 DIAGNOSIS — G56.11 MEDIAN NEUROPATHY, RIGHT: ICD-10-CM

## 2025-08-15 RX ORDER — HYDROCODONE BITARTRATE AND ACETAMINOPHEN 5; 325 MG/1; MG/1
1 TABLET ORAL EVERY 6 HOURS PRN
Qty: 30 TABLET | Refills: 0 | Status: SHIPPED | OUTPATIENT
Start: 2025-08-15

## 2025-08-27 DIAGNOSIS — G56.11 MEDIAN NEUROPATHY, RIGHT: ICD-10-CM

## 2025-08-28 RX ORDER — HYDROCODONE BITARTRATE AND ACETAMINOPHEN 5; 325 MG/1; MG/1
1 TABLET ORAL EVERY 6 HOURS PRN
Qty: 30 TABLET | Refills: 0 | Status: SHIPPED | OUTPATIENT
Start: 2025-08-28

## (undated) DEVICE — ENDOSCOPY PORT CONNECTOR FOR OLYMPUS® SCOPES: Brand: ERBE

## (undated) DEVICE — LUBE JELLY PK/2.75GM STRL BX/144

## (undated) DEVICE — FRCP BX RADJAW4 NDL 2.8 240 STD OG

## (undated) DEVICE — CONMED SCOPE SAVER BITE BLOCK, 20X27 MM: Brand: SCOPE SAVER

## (undated) DEVICE — VLV SXN AIR/H2O ORCAPOD3 1P/U STRL

## (undated) DEVICE — HYBRID CO2 TUBING/CAP SET FOR OLYMPUS® SCOPES & CO2 SOURCE: Brand: ERBE

## (undated) DEVICE — Device